# Patient Record
Sex: FEMALE | Race: WHITE | NOT HISPANIC OR LATINO | Employment: OTHER | URBAN - METROPOLITAN AREA
[De-identification: names, ages, dates, MRNs, and addresses within clinical notes are randomized per-mention and may not be internally consistent; named-entity substitution may affect disease eponyms.]

---

## 2017-01-17 ENCOUNTER — HOSPITAL ENCOUNTER (INPATIENT)
Facility: HOSPITAL | Age: 52
LOS: 3 days | Discharge: HOME HEALTH | DRG: 065 | End: 2017-01-20
Attending: EMERGENCY MEDICINE | Admitting: PSYCHIATRY & NEUROLOGY
Payer: MEDICAID

## 2017-01-17 ENCOUNTER — TELEMEDICINE (OUTPATIENT)
Dept: TELEMEDICINE | Facility: OTHER | Age: 52
End: 2017-01-17
Payer: MEDICAID

## 2017-01-17 DIAGNOSIS — E78.2 MIXED HYPERLIPIDEMIA: ICD-10-CM

## 2017-01-17 DIAGNOSIS — E78.5 HYPERLIPIDEMIA, UNSPECIFIED HYPERLIPIDEMIA TYPE: ICD-10-CM

## 2017-01-17 DIAGNOSIS — I25.10 CORONARY ARTERY DISEASE INVOLVING NATIVE CORONARY ARTERY OF NATIVE HEART WITHOUT ANGINA PECTORIS: ICD-10-CM

## 2017-01-17 DIAGNOSIS — Z92.82 TISSUE PLASMINOGEN ACTIVATOR (T-PA) ADMINISTERED AT OTHER FACILITY WITHIN 24 HOURS PRIOR TO CURRENT ADMISSION: ICD-10-CM

## 2017-01-17 DIAGNOSIS — G81.94 LEFT HEMIPARESIS: ICD-10-CM

## 2017-01-17 DIAGNOSIS — I10 HTN (HYPERTENSION), BENIGN: ICD-10-CM

## 2017-01-17 DIAGNOSIS — R00.0 TACHYCARDIA: ICD-10-CM

## 2017-01-17 DIAGNOSIS — I10 ESSENTIAL HYPERTENSION: ICD-10-CM

## 2017-01-17 DIAGNOSIS — I63.511 ACUTE RIGHT MCA STROKE: ICD-10-CM

## 2017-01-17 DIAGNOSIS — I63.9 ACUTE ISCHEMIC STROKE: ICD-10-CM

## 2017-01-17 DIAGNOSIS — G81.94 HEMIPARESIS, LEFT: ICD-10-CM

## 2017-01-17 LAB
ALBUMIN SERPL BCP-MCNC: 3.3 G/DL
ALP SERPL-CCNC: 51 U/L
ALT SERPL W/O P-5'-P-CCNC: 53 U/L
ANION GAP SERPL CALC-SCNC: 6 MMOL/L
AST SERPL-CCNC: 25 U/L
BASOPHILS # BLD AUTO: 0.03 K/UL
BASOPHILS NFR BLD: 0.6 %
BILIRUB SERPL-MCNC: 0.4 MG/DL
BUN SERPL-MCNC: 10 MG/DL
CALCIUM SERPL-MCNC: 8.6 MG/DL
CHLORIDE SERPL-SCNC: 109 MMOL/L
CO2 SERPL-SCNC: 25 MMOL/L
CREAT SERPL-MCNC: 0.7 MG/DL
DIFFERENTIAL METHOD: ABNORMAL
EOSINOPHIL # BLD AUTO: 0.1 K/UL
EOSINOPHIL NFR BLD: 2.4 %
ERYTHROCYTE [DISTWIDTH] IN BLOOD BY AUTOMATED COUNT: 13 %
EST. GFR  (AFRICAN AMERICAN): >60 ML/MIN/1.73 M^2
EST. GFR  (NON AFRICAN AMERICAN): >60 ML/MIN/1.73 M^2
GLUCOSE SERPL-MCNC: 85 MG/DL
HCT VFR BLD AUTO: 37.1 %
HGB BLD-MCNC: 12.6 G/DL
INR PPP: 1.1
LYMPHOCYTES # BLD AUTO: 1.6 K/UL
LYMPHOCYTES NFR BLD: 31.2 %
MCH RBC QN AUTO: 30.9 PG
MCHC RBC AUTO-ENTMCNC: 34 %
MCV RBC AUTO: 91 FL
MONOCYTES # BLD AUTO: 0.3 K/UL
MONOCYTES NFR BLD: 5.7 %
NEUTROPHILS # BLD AUTO: 3 K/UL
NEUTROPHILS NFR BLD: 59.9 %
PLATELET # BLD AUTO: 145 K/UL
PMV BLD AUTO: 10.8 FL
POCT GLUCOSE: 82 MG/DL (ref 70–110)
POTASSIUM SERPL-SCNC: 4.1 MMOL/L
PROT SERPL-MCNC: 6 G/DL
PROTHROMBIN TIME: 11.8 SEC
RBC # BLD AUTO: 4.08 M/UL
SODIUM SERPL-SCNC: 140 MMOL/L
TROPONIN I SERPL DL<=0.01 NG/ML-MCNC: <0.006 NG/ML
WBC # BLD AUTO: 5.06 K/UL

## 2017-01-17 PROCEDURE — 99291 CRITICAL CARE FIRST HOUR: CPT | Mod: ,,, | Performed by: EMERGENCY MEDICINE

## 2017-01-17 PROCEDURE — 82962 GLUCOSE BLOOD TEST: CPT

## 2017-01-17 PROCEDURE — 93010 ELECTROCARDIOGRAM REPORT: CPT | Mod: 76,,, | Performed by: INTERNAL MEDICINE

## 2017-01-17 PROCEDURE — 99291 CRITICAL CARE FIRST HOUR: CPT | Mod: 25

## 2017-01-17 PROCEDURE — 84443 ASSAY THYROID STIM HORMONE: CPT

## 2017-01-17 PROCEDURE — 93010 ELECTROCARDIOGRAM REPORT: CPT | Mod: ,,, | Performed by: INTERNAL MEDICINE

## 2017-01-17 PROCEDURE — 86850 RBC ANTIBODY SCREEN: CPT

## 2017-01-17 PROCEDURE — 99203 OFFICE O/P NEW LOW 30 MIN: CPT | Mod: GT,,, | Performed by: PSYCHIATRY & NEUROLOGY

## 2017-01-17 PROCEDURE — 85025 COMPLETE CBC W/AUTO DIFF WBC: CPT

## 2017-01-17 PROCEDURE — 96361 HYDRATE IV INFUSION ADD-ON: CPT

## 2017-01-17 PROCEDURE — 80053 COMPREHEN METABOLIC PANEL: CPT

## 2017-01-17 PROCEDURE — 82553 CREATINE MB FRACTION: CPT

## 2017-01-17 PROCEDURE — 84484 ASSAY OF TROPONIN QUANT: CPT

## 2017-01-17 PROCEDURE — 93005 ELECTROCARDIOGRAM TRACING: CPT

## 2017-01-17 PROCEDURE — 85610 PROTHROMBIN TIME: CPT

## 2017-01-17 PROCEDURE — 84439 ASSAY OF FREE THYROXINE: CPT

## 2017-01-17 PROCEDURE — 12000002 HC ACUTE/MED SURGE SEMI-PRIVATE ROOM

## 2017-01-17 PROCEDURE — 96374 THER/PROPH/DIAG INJ IV PUSH: CPT

## 2017-01-17 PROCEDURE — 83036 HEMOGLOBIN GLYCOSYLATED A1C: CPT

## 2017-01-17 PROCEDURE — 99233 SBSQ HOSP IP/OBS HIGH 50: CPT | Mod: ,,, | Performed by: NURSE PRACTITIONER

## 2017-01-17 PROCEDURE — 85730 THROMBOPLASTIN TIME PARTIAL: CPT

## 2017-01-17 PROCEDURE — 80061 LIPID PANEL: CPT

## 2017-01-17 PROCEDURE — 86900 BLOOD TYPING SEROLOGIC ABO: CPT

## 2017-01-17 RX ORDER — POTASSIUM CHLORIDE 7.45 MG/ML
60 INJECTION INTRAVENOUS
Status: DISCONTINUED | OUTPATIENT
Start: 2017-01-18 | End: 2017-01-19

## 2017-01-17 RX ORDER — CLONAZEPAM 1 MG/1
1 TABLET ORAL 3 TIMES DAILY
COMMUNITY

## 2017-01-17 RX ORDER — GLUCAGON 1 MG
1 KIT INJECTION
Status: DISCONTINUED | OUTPATIENT
Start: 2017-01-18 | End: 2017-01-20

## 2017-01-17 RX ORDER — OMEPRAZOLE 40 MG/1
40 CAPSULE, DELAYED RELEASE ORAL DAILY
COMMUNITY

## 2017-01-17 RX ORDER — MAGNESIUM SULFATE HEPTAHYDRATE 40 MG/ML
4 INJECTION, SOLUTION INTRAVENOUS
Status: DISCONTINUED | OUTPATIENT
Start: 2017-01-18 | End: 2017-01-19

## 2017-01-17 RX ORDER — MAGNESIUM SULFATE HEPTAHYDRATE 40 MG/ML
2 INJECTION, SOLUTION INTRAVENOUS
Status: DISCONTINUED | OUTPATIENT
Start: 2017-01-18 | End: 2017-01-19

## 2017-01-17 RX ORDER — INSULIN ASPART 100 [IU]/ML
0-5 INJECTION, SOLUTION INTRAVENOUS; SUBCUTANEOUS EVERY 6 HOURS PRN
Status: DISCONTINUED | OUTPATIENT
Start: 2017-01-18 | End: 2017-01-20

## 2017-01-17 RX ORDER — ATORVASTATIN CALCIUM 80 MG/1
80 TABLET, FILM COATED ORAL DAILY
COMMUNITY

## 2017-01-17 RX ORDER — AMOXICILLIN 250 MG
1 CAPSULE ORAL 2 TIMES DAILY
Status: DISCONTINUED | OUTPATIENT
Start: 2017-01-18 | End: 2017-01-20 | Stop reason: HOSPADM

## 2017-01-17 RX ORDER — POTASSIUM CHLORIDE 7.45 MG/ML
80 INJECTION INTRAVENOUS
Status: DISCONTINUED | OUTPATIENT
Start: 2017-01-18 | End: 2017-01-19

## 2017-01-17 RX ORDER — ONDANSETRON 2 MG/ML
4 INJECTION INTRAMUSCULAR; INTRAVENOUS EVERY 12 HOURS PRN
Status: DISCONTINUED | OUTPATIENT
Start: 2017-01-18 | End: 2017-01-20 | Stop reason: HOSPADM

## 2017-01-17 RX ORDER — CLOPIDOGREL BISULFATE 75 MG/1
75 TABLET ORAL DAILY
Status: ON HOLD | COMMUNITY
End: 2017-01-20 | Stop reason: HOSPADM

## 2017-01-17 RX ORDER — POTASSIUM CHLORIDE 7.45 MG/ML
40 INJECTION INTRAVENOUS
Status: DISCONTINUED | OUTPATIENT
Start: 2017-01-18 | End: 2017-01-19

## 2017-01-17 RX ORDER — ATORVASTATIN CALCIUM 20 MG/1
40 TABLET, FILM COATED ORAL DAILY
Status: DISCONTINUED | OUTPATIENT
Start: 2017-01-18 | End: 2017-01-20 | Stop reason: HOSPADM

## 2017-01-17 RX ORDER — SODIUM CHLORIDE 9 MG/ML
INJECTION, SOLUTION INTRAVENOUS CONTINUOUS
Status: DISCONTINUED | OUTPATIENT
Start: 2017-01-18 | End: 2017-01-20

## 2017-01-17 RX ORDER — FAMOTIDINE 20 MG/1
20 TABLET, FILM COATED ORAL 2 TIMES DAILY
Status: DISCONTINUED | OUTPATIENT
Start: 2017-01-18 | End: 2017-01-20 | Stop reason: HOSPADM

## 2017-01-17 RX ORDER — NICARDIPINE HYDROCHLORIDE 0.2 MG/ML
5 INJECTION INTRAVENOUS CONTINUOUS
Status: DISCONTINUED | OUTPATIENT
Start: 2017-01-18 | End: 2017-01-19

## 2017-01-17 RX ORDER — MULTIVITAMIN
1 TABLET ORAL DAILY
COMMUNITY

## 2017-01-17 RX ORDER — SODIUM CHLORIDE 0.9 % (FLUSH) 0.9 %
3 SYRINGE (ML) INJECTION EVERY 8 HOURS
Status: DISCONTINUED | OUTPATIENT
Start: 2017-01-18 | End: 2017-01-20 | Stop reason: HOSPADM

## 2017-01-17 RX ORDER — LISINOPRIL 10 MG/1
10 TABLET ORAL DAILY
COMMUNITY

## 2017-01-17 RX ORDER — PANTOPRAZOLE SODIUM 40 MG/1
40 TABLET, DELAYED RELEASE ORAL DAILY
Status: ON HOLD | COMMUNITY
End: 2017-01-20 | Stop reason: HOSPADM

## 2017-01-17 RX ORDER — CYPROHEPTADINE HYDROCHLORIDE 4 MG/1
4 TABLET ORAL 2 TIMES DAILY
COMMUNITY

## 2017-01-17 RX ORDER — LABETALOL HYDROCHLORIDE 5 MG/ML
10 INJECTION, SOLUTION INTRAVENOUS
Status: DISCONTINUED | OUTPATIENT
Start: 2017-01-18 | End: 2017-01-20 | Stop reason: HOSPADM

## 2017-01-17 RX ADMIN — IOHEXOL 100 ML: 350 INJECTION, SOLUTION INTRAVENOUS at 11:01

## 2017-01-17 NOTE — IP AVS SNAPSHOT
[facility]  [address]  [facility] [address]  Phone: [phone]           Patient Discharge Instructions     Our goal is to set you up for success. This packet includes information on your condition, medications, and your home care. It will help you to care for yourself so you don't get sicker and need to go back to the hospital.     Please ask your nurse if you have any questions.        There are many details to remember when preparing to leave the hospital. Here is what you will need to do:    1. Take your medicine. If you are prescribed medications, review your Medication List in the following pages. You may have new medications to  at the pharmacy and others that you'll need to stop taking. Review the instructions for how and when to take your medications. Talk with your doctor or nurses if you are unsure of what to do.     2. Go to your follow-up appointments. Specific follow-up information is listed in the following pages. Your may be contacted by a transition nurse or clinical provider about future appointments. Be sure we have all of the phone numbers to reach you, if needed. Please contact your provider's office if you are unable to make an appointment.     3. Watch for warning signs. Your doctor or nurse will give you detailed warning signs to watch for and when to call for assistance. These instructions may also include educational information about your condition. If you experience any of warning signs to your health, call your doctor.               [facility]  Unless otherwise directed by your provider, please contact [facility], our nurse care line that is available for 24/7 assistance.     [phone] (toll-free)    Registered nurses in the [facility] provide clinical advisement, health education, appointment booking, and other advisory services.                    ** Verify the list of medication(s) below is accurate and up  to date. Carry this with you in case of emergency. If your medications have changed, please notify your healthcare provider.             Medication List      START taking these medications        Additional Info                      aspirin 325 MG tablet   Quantity:  30 tablet   Refills:  1   Dose:  325 mg    Last time this was given:  325 mg on 1/20/2017  9:50 AM   Instructions:  Take 1 tablet (325 mg total) by mouth once daily.     Begin Date    AM    Noon    PM    Bedtime         CONTINUE taking these medications        Additional Info                      atorvastatin 80 MG tablet   Commonly known as:  LIPITOR   Refills:  0   Dose:  80 mg    Last time this was given:  40 mg on 1/20/2017  9:50 AM   Instructions:  Take 80 mg by mouth once daily.     Begin Date    AM    Noon    PM    Bedtime       cyproheptadine 4 mg tablet   Commonly known as:  PERIACTIN   Refills:  0   Dose:  4 mg    Instructions:  Take 4 mg by mouth 2 (two) times daily.     Begin Date    AM    Noon    PM    Bedtime       KLONOPIN 1 MG tablet   Refills:  0   Dose:  1 mg   Generic drug:  clonazePAM    Last time this was given:  1 mg on 1/20/2017  9:56 AM   Instructions:  Take 1 mg by mouth 2 (two) times daily as needed for Anxiety.     Begin Date    AM    Noon    PM    Bedtime       lisinopril 10 MG tablet   Refills:  0   Dose:  10 mg    Instructions:  Take 10 mg by mouth once daily.     Begin Date    AM    Noon    PM    Bedtime       omeprazole 40 MG capsule   Commonly known as:  PRILOSEC   Refills:  0   Dose:  40 mg    Instructions:  Take 40 mg by mouth once daily.     Begin Date    AM    Noon    PM    Bedtime       ONE DAILY MULTIVITAMIN per tablet   Refills:  0   Dose:  1 tablet   Generic drug:  multivitamin    Instructions:  Take 1 tablet by mouth once daily.     Begin Date    AM    Noon    PM    Bedtime         STOP taking these medications     clopidogrel 75 mg tablet   Commonly known as:  PLAVIX       pantoprazole 40 MG tablet   Commonly  known as:  PROTONIX            Where to Get Your Medications      These medications were sent to [facility] - [place], [place] - [address]  [address], [place] [address]     Phone:  [phone]     aspirin 325 MG tablet                  Please bring to all follow up appointments:    1. A copy of your discharge instructions.  2. All medicines you are currently taking in their original bottles.  3. Identification and insurance card.    Please arrive 15 minutes ahead of scheduled appointment time.    Please call 24 hours in advance if you must reschedule your appointment and/or time.        Your Scheduled Appointments     Feb 20, 2017 11:20 AM CST   New Patient with Yadiel Booth MD   [facility] ([facility] )    [address]  [place] [id number] [phone]            Jun 08, 2017  1:00 PM CDT   New Patient with AIMEE PMR RESIDENT   [facility] ([facility])    [address] [place] [address], [facility] [id number] [phone]              Follow-up Information     Follow up with Anuel Geronimo NP On 1/24/2017.    Specialty:  Family Medicine    Why:  Hospital follow up @ 9:15 am     Contact information:    [address] [place] [address]  [place] [address]  [phone]          Follow up with Yadiel Booth MD On 2/20/2017.    Specialty:  Neurology    Why:  Hospital follow up  @ 11:20    Contact information:    [address] [facility] [address]  [phone]          Follow up with Anuel Geronimo NP. Schedule an appointment as soon as possible for a visit in 1 week.    Specialty:  Family Medicine    Contact information:    [address] [facility] [address]  [place] [address]  [phone]        Referrals     Future Orders    Referral to OutPatient  Physical therapy     Questions:    Post Surgical?:  No    Eval and Treat:  Yes    Duration:      Frequency (times per week):      Precautions:      Location:      OT Location:      Restore  "Functional ADL Training?:      Gait Training:      Therapeutic Exercises:      Wound Care:      Traction:      Electric Stimulation:      IONTO.:      U/S:      Developmental Stimulation?:      Specialty Programs:      Referral to Outpatient Occupational therapy     Questions:    Post Surgical?:  No    OT Location:      Restore Functional ADL Training?:      Therapeutic Exercises:      Electric Stimulation:      IONTO.:      U/S:      Developmental Stimulation?:      Splinting (Specific Area):      Specialty Program:      Referral to OutPatient Speech Therapy     Questions:    Speech Therapy Eval and Treat:      Speech Language Eval and Treat:      Bedside Swallow Study:      Modified Barium Swallow Study:          Discharge Instructions     Future Orders    3 IN 1 COMMODE FOR HOME USE     Questions:    Type:  Standard    Height:  5' 2" (1.575 m)    Weight:  54.3 kg (119 lb 11.4 oz)    Does patient have medical equipment at home?:  none    Length of need (1-99 months):  99    Activity as tolerated     CANE FOR HOME USE     Questions:    Type of Cane:  Straight    Height:  5' 2" (1.575 m)    Weight:  54.3 kg (119 lb 11.4 oz)    Does patient have medical equipment at home?:  none    Length of need (1-99 months):  99    Please check all that apply:  Patient is unable to safely ambulate without equipment.    Patient's condition impairs ambulation.    Diet general     Questions:    Total calories:      Fat restriction, if any:      Protein restriction, if any:      Na restriction, if any:      Fluid restriction:      Additional restrictions:          Primary Diagnosis     Your primary diagnosis was:  Stroke      Admission Information     Date & Time Provider Department [facility]    1/17/2017 10:30 PM Chas Atkinson MD [facility] [id number]      Care Providers     Provider Role Specialty Primary office phone    Chas Atkinson MD Attending Provider Neurology [phone]    Nabor Denton MD " "Team Attending  Neuro Critical Care [phone]    Bishop Goddard MD Team Attending  Neuro Critical Care [phone]    Audie Fernandez MD Team Attending  Interventional Neurology [phone]    Chas Atkinson MD Team Attending  Neurology [phone]      Your Vitals Were     BP Pulse Temp Resp Height Weight    125/82 (BP Location: Right arm, Patient Position: Lying, BP Method: Automatic) 69 97.8 °F (36.6 °C) (Oral) 16 5' 2" (1.575 m) 54.3 kg (119 lb 11.4 oz)    SpO2 BMI             99% 21.9 kg/m2         Recent Lab Values        1/17/2017                          10:44 PM           A1C 5.6           Comment for A1C at 10:44 PM on 1/17/2017:  According to ADA guidelines, hemoglobin A1C <7.0% represents  optimal control in non-pregnant diabetic patients.  Different  metrics may apply to specific populations.   Standards of Medical Care in Diabetes - 2016.  For the purpose of screening for the presence of diabetes:  <5.7%     Consistent with the absence of diabetes  5.7-6.4%  Consistent with increasing risk for diabetes   (prediabetes)  >or=6.5%  Consistent with diabetes  Currently no consensus exists for use of hemoglobin A1C  for diagnosis of diabetes for children.        Allergies as of 1/20/2017        Reactions    Bentyl [Dicyclomine]     Clindamycin     Macrobid [Nitrofurantoin Monohyd/m-cryst]     Percocet [Oxycodone-acetaminophen]     Thorazine [Chlorpromazine]     Topamax [Topiramate]     Tramadol       Advance Directives     An advance directive is a document which, in the event you are no longer able to make decisions for yourself, tells your healthcare team what kind of treatment you do or do not want to receive, or who you would like to make those decisions for you.  If you do not currently have an advance directive, [facility] encourages you to create one.  For more information call:  [phone]), [phone] ([phone]),  or log on to [email or web address].      "   Smoking Cessation     If you would like to quit smoking:   You may be eligible for free services if you are a [place] resident and started smoking cigarettes before September 1, 1988.  Call the Smoking Cessation Trust (SCT) toll free at (160) 616-2628 or (524) 772-2127.   Call 1-800-QUIT-NOW if you do not meet the above criteria.            Language Assistance Services     ATTENTION: Language assistance services are available, free of charge. Please call [phone].      ATENCIÓN: Si habla radha, tiene a hook disposición servicios gratuitos de asistencia lingüística. Llame al [phone].     CHÚ Ý: N?u b?n nói Ti?ng Vi?t, có các d?ch v? h? tr? ngôn ng? mi?n phí dành cho b?n. G?i s? [phone].        Stroke Education              [facility] Sign-Up     Activating your [facility] account is as easy as 1-2-3!     1) Visit [email or web address], select Sign Up Now, enter this activation code and your date of birth, then select Next.  [email or web address]  Expires: 3/6/2017  3:36 PM      2) Create a username and password to use when you visit [facility] in the future and select a security question in case you lose your password and select Next.    3) Enter your e-mail address and click Sign Up!    Additional Information  If you have questions, please e-mail [email or web address] or call [phone] to talk to our [facility] staff. Remember, [facility] is NOT to be used for urgent needs. For medical emergencies, dial 911.          [facility] complies with applicable Federal civil rights laws and does not discriminate on the basis of race, color, national origin, age, disability, or sex.

## 2017-01-18 PROBLEM — I10 HTN (HYPERTENSION), BENIGN: Status: ACTIVE | Noted: 2017-01-18

## 2017-01-18 PROBLEM — Z92.82 TISSUE PLASMINOGEN ACTIVATOR (T-PA) ADMINISTERED AT OTHER FACILITY WITHIN 24 HOURS PRIOR TO CURRENT ADMISSION: Status: ACTIVE | Noted: 2017-01-18

## 2017-01-18 PROBLEM — G81.94 LEFT HEMIPARESIS: Status: ACTIVE | Noted: 2017-01-18

## 2017-01-18 PROBLEM — E78.5 HYPERLIPIDEMIA: Status: ACTIVE | Noted: 2017-01-18

## 2017-01-18 LAB
ABO + RH BLD: NORMAL
ALBUMIN SERPL BCP-MCNC: 2.4 G/DL
ALP SERPL-CCNC: 37 U/L
ALT SERPL W/O P-5'-P-CCNC: 34 U/L
ANION GAP SERPL CALC-SCNC: 3 MMOL/L
ANION GAP SERPL CALC-SCNC: 5 MMOL/L
APTT BLDCRRT: 22.3 SEC
AST SERPL-CCNC: 16 U/L
BACTERIA #/AREA URNS AUTO: ABNORMAL /HPF
BASOPHILS # BLD AUTO: 0.01 K/UL
BASOPHILS NFR BLD: 0.2 %
BILIRUB SERPL-MCNC: 0.4 MG/DL
BILIRUB UR QL STRIP: NEGATIVE
BLD GP AB SCN CELLS X3 SERPL QL: NORMAL
BUN SERPL-MCNC: 8 MG/DL
BUN SERPL-MCNC: 9 MG/DL
CALCIUM SERPL-MCNC: 6.3 MG/DL
CALCIUM SERPL-MCNC: 8.6 MG/DL
CHLORIDE SERPL-SCNC: 109 MMOL/L
CHLORIDE SERPL-SCNC: 119 MMOL/L
CHOLEST/HDLC SERPL: 4.9 {RATIO}
CK MB SERPL-MCNC: 0.4 NG/ML
CK MB SERPL-RTO: 0.9 %
CK SERPL-CCNC: 43 U/L
CLARITY UR REFRACT.AUTO: ABNORMAL
CO2 SERPL-SCNC: 21 MMOL/L
CO2 SERPL-SCNC: 28 MMOL/L
COLOR UR AUTO: YELLOW
CREAT SERPL-MCNC: 0.6 MG/DL
CREAT SERPL-MCNC: 0.8 MG/DL
DIFFERENTIAL METHOD: ABNORMAL
EOSINOPHIL # BLD AUTO: 0.1 K/UL
EOSINOPHIL NFR BLD: 1.5 %
ERYTHROCYTE [DISTWIDTH] IN BLOOD BY AUTOMATED COUNT: 12.9 %
EST. GFR  (AFRICAN AMERICAN): >60 ML/MIN/1.73 M^2
EST. GFR  (AFRICAN AMERICAN): >60 ML/MIN/1.73 M^2
EST. GFR  (NON AFRICAN AMERICAN): >60 ML/MIN/1.73 M^2
EST. GFR  (NON AFRICAN AMERICAN): >60 ML/MIN/1.73 M^2
ESTIMATED AVG GLUCOSE: 114 MG/DL
GLUCOSE SERPL-MCNC: 111 MG/DL
GLUCOSE SERPL-MCNC: 59 MG/DL
GLUCOSE UR QL STRIP: ABNORMAL
HBA1C MFR BLD HPLC: 5.6 %
HCT VFR BLD AUTO: 29.1 %
HDL/CHOLESTEROL RATIO: 20.5 %
HDLC SERPL-MCNC: 132 MG/DL
HDLC SERPL-MCNC: 27 MG/DL
HGB BLD-MCNC: 9.8 G/DL
HGB UR QL STRIP: NEGATIVE
KETONES UR QL STRIP: NEGATIVE
LDLC SERPL CALC-MCNC: 74.2 MG/DL
LEUKOCYTE ESTERASE UR QL STRIP: ABNORMAL
LYMPHOCYTES # BLD AUTO: 1 K/UL
LYMPHOCYTES NFR BLD: 23.3 %
MAGNESIUM SERPL-MCNC: 1.5 MG/DL
MCH RBC QN AUTO: 30.2 PG
MCHC RBC AUTO-ENTMCNC: 33.7 %
MCV RBC AUTO: 90 FL
MICROSCOPIC COMMENT: ABNORMAL
MONOCYTES # BLD AUTO: 0.2 K/UL
MONOCYTES NFR BLD: 5.6 %
NEUTROPHILS # BLD AUTO: 2.9 K/UL
NEUTROPHILS NFR BLD: 69.2 %
NITRITE UR QL STRIP: NEGATIVE
NONHDLC SERPL-MCNC: 105 MG/DL
PH UR STRIP: 6 [PH] (ref 5–8)
PHOSPHATE SERPL-MCNC: 2.8 MG/DL
PLATELET # BLD AUTO: 109 K/UL
PMV BLD AUTO: 10.6 FL
POCT GLUCOSE: 83 MG/DL (ref 70–110)
POTASSIUM SERPL-SCNC: 3.1 MMOL/L
POTASSIUM SERPL-SCNC: 4 MMOL/L
PROT SERPL-MCNC: 4.2 G/DL
PROT UR QL STRIP: NEGATIVE
RBC # BLD AUTO: 3.25 M/UL
RBC #/AREA URNS AUTO: 1 /HPF (ref 0–4)
SODIUM SERPL-SCNC: 142 MMOL/L
SODIUM SERPL-SCNC: 143 MMOL/L
SP GR UR STRIP: 1.03 (ref 1–1.03)
SQUAMOUS #/AREA URNS AUTO: 4 /HPF
T4 FREE SERPL-MCNC: 1.08 NG/DL
TRIGL SERPL-MCNC: 154 MG/DL
TROPONIN I SERPL DL<=0.01 NG/ML-MCNC: 0.01 NG/ML
TROPONIN I SERPL DL<=0.01 NG/ML-MCNC: <0.006 NG/ML
TROPONIN I SERPL DL<=0.01 NG/ML-MCNC: <0.006 NG/ML
TSH SERPL DL<=0.005 MIU/L-ACNC: 0.18 UIU/ML
URN SPEC COLLECT METH UR: ABNORMAL
UROBILINOGEN UR STRIP-ACNC: NEGATIVE EU/DL
WBC # BLD AUTO: 4.12 K/UL
WBC #/AREA URNS AUTO: 23 /HPF (ref 0–5)

## 2017-01-18 PROCEDURE — 99233 SBSQ HOSP IP/OBS HIGH 50: CPT | Mod: ,,, | Performed by: PSYCHIATRY & NEUROLOGY

## 2017-01-18 PROCEDURE — 81001 URINALYSIS AUTO W/SCOPE: CPT

## 2017-01-18 PROCEDURE — 20000000 HC ICU ROOM

## 2017-01-18 PROCEDURE — 25000003 PHARM REV CODE 250: Performed by: PSYCHIATRY & NEUROLOGY

## 2017-01-18 PROCEDURE — G8996 SWALLOW CURRENT STATUS: HCPCS | Mod: CH

## 2017-01-18 PROCEDURE — G8997 SWALLOW GOAL STATUS: HCPCS | Mod: CH

## 2017-01-18 PROCEDURE — 92523 SPEECH SOUND LANG COMPREHEN: CPT

## 2017-01-18 PROCEDURE — G8988 SELF CARE GOAL STATUS: HCPCS | Mod: CJ

## 2017-01-18 PROCEDURE — 85025 COMPLETE CBC W/AUTO DIFF WBC: CPT

## 2017-01-18 PROCEDURE — 25000003 PHARM REV CODE 250: Performed by: NURSE PRACTITIONER

## 2017-01-18 PROCEDURE — 83735 ASSAY OF MAGNESIUM: CPT

## 2017-01-18 PROCEDURE — 25500020 PHARM REV CODE 255: Performed by: EMERGENCY MEDICINE

## 2017-01-18 PROCEDURE — 97165 OT EVAL LOW COMPLEX 30 MIN: CPT

## 2017-01-18 PROCEDURE — 80048 BASIC METABOLIC PNL TOTAL CA: CPT

## 2017-01-18 PROCEDURE — 97161 PT EVAL LOW COMPLEX 20 MIN: CPT

## 2017-01-18 PROCEDURE — 84484 ASSAY OF TROPONIN QUANT: CPT | Mod: 91

## 2017-01-18 PROCEDURE — 84484 ASSAY OF TROPONIN QUANT: CPT

## 2017-01-18 PROCEDURE — 80053 COMPREHEN METABOLIC PANEL: CPT

## 2017-01-18 PROCEDURE — 92610 EVALUATE SWALLOWING FUNCTION: CPT

## 2017-01-18 PROCEDURE — G8987 SELF CARE CURRENT STATUS: HCPCS | Mod: CK

## 2017-01-18 PROCEDURE — 84100 ASSAY OF PHOSPHORUS: CPT

## 2017-01-18 RX ORDER — ASPIRIN 325 MG
325 TABLET ORAL DAILY
Status: DISCONTINUED | OUTPATIENT
Start: 2017-01-18 | End: 2017-01-20 | Stop reason: HOSPADM

## 2017-01-18 RX ORDER — HEPARIN SODIUM 5000 [USP'U]/ML
5000 INJECTION, SOLUTION INTRAVENOUS; SUBCUTANEOUS EVERY 8 HOURS
Status: DISCONTINUED | OUTPATIENT
Start: 2017-01-18 | End: 2017-01-20 | Stop reason: HOSPADM

## 2017-01-18 RX ORDER — ACETAMINOPHEN 325 MG/1
650 TABLET ORAL EVERY 6 HOURS PRN
Status: DISCONTINUED | OUTPATIENT
Start: 2017-01-18 | End: 2017-01-20 | Stop reason: HOSPADM

## 2017-01-18 RX ADMIN — SODIUM CHLORIDE: 0.9 INJECTION, SOLUTION INTRAVENOUS at 12:01

## 2017-01-18 RX ADMIN — SODIUM CHLORIDE: 0.9 INJECTION, SOLUTION INTRAVENOUS at 05:01

## 2017-01-18 RX ADMIN — Medication 3 ML: at 10:01

## 2017-01-18 RX ADMIN — FAMOTIDINE 20 MG: 20 TABLET, FILM COATED ORAL at 10:01

## 2017-01-18 RX ADMIN — ATORVASTATIN CALCIUM 40 MG: 20 TABLET, FILM COATED ORAL at 10:01

## 2017-01-18 RX ADMIN — STANDARDIZED SENNA CONCENTRATE AND DOCUSATE SODIUM 1 TABLET: 8.6; 5 TABLET, FILM COATED ORAL at 10:01

## 2017-01-18 RX ADMIN — SODIUM CHLORIDE: 0.9 INJECTION, SOLUTION INTRAVENOUS at 10:01

## 2017-01-18 RX ADMIN — ASPIRIN 325 MG ORAL TABLET 325 MG: 325 PILL ORAL at 10:01

## 2017-01-18 RX ADMIN — FAMOTIDINE 20 MG: 20 TABLET, FILM COATED ORAL at 09:01

## 2017-01-18 RX ADMIN — DEXTROSE MONOHYDRATE 12.5 G: 25 INJECTION, SOLUTION INTRAVENOUS at 08:01

## 2017-01-18 RX ADMIN — HEPARIN SODIUM 5000 UNITS: 5000 INJECTION, SOLUTION INTRAVENOUS; SUBCUTANEOUS at 10:01

## 2017-01-18 RX ADMIN — ACETAMINOPHEN 650 MG: 325 TABLET ORAL at 05:01

## 2017-01-18 NOTE — ED NOTES
"Patient identifiers verified and correct for Shun Tidwell. Pt resting with family at bedside.    LOC: The patient is awake, alert and oriented x 4. speaking appropriately, slightly slurred speech  APPEARANCE: Patient resting comfortably and in no acute distress. clean and well groomed. No JVD visible. Stated, "pain level is a 0."  SKIN: skin is warm dry and intact. color consistent with ethnicity. No tenting observed. capillary refill <3 seconds. no clubbing noted to nail beds. no breakdown or brusing visible. Mucus membranes moist acyanotic  MUSKULOSKELETAL: Full range of motion present in all extremities. Hand  and leg strength strong +2 bilaterally  RESPIRATORY: Airway is open and patent. Respirations-unlabored, spontaneous, equal bilaterally on inspiration and expiration. normal rate and rhythm. No accessory muscle use noted. Lungs clear to auscultation in all fields bilaterally anterior and posterior.   CARDIAC: Patient has even and regular rhythm. no bruits auscultated or thrills palpated. no peripheral edema noted. No chest pain.  ABDOMEN: Soft and non tender to palpation. no distention noted. Normoactive bowel sounds X4 quadrants. No complaints of abnormal bowel movements. Normal appetite.   NEUROLOGIC: See neuro exam  : No complaints of frequency, burning, urgency or blood in the urine.           "

## 2017-01-18 NOTE — PLAN OF CARE
Problem: SLP Goal  Goal: SLP Goal  Outcome: Ongoing (interventions implemented as appropriate)  Regular diet with thin liquids recmmended.  Pt. Takes pills one at a time.    Alicia Hernandez MA/[facility]-SLP  Speech Language Pathologist  Pager [phone]  1/18/2017

## 2017-01-18 NOTE — PT/OT/SLP EVAL
Occupational Therapy  Evaluation    Shun Tidwell   MRN: [id number]   Admitting Diagnosis: Acute right MCA stroke    OT Date of Treatment: 17   OT Start Time: 825  OT Stop Time: 845  OT Total Time (min): 20 min    Billable Minutes:  Evaluation 20    Diagnosis: Acute right MCA stroke   Left sided facial & arm numbness & speech difficulties, given tPA, then at [facility] started having chest pain, headache & blurry vision    Past Medical History   Diagnosis Date    Anxiety     CAD (coronary artery disease), native coronary artery     Current smoker     Depression     HTN (hypertension), benign     Hyperlipidemia       Past Surgical History   Procedure Laterality Date    Femoral stents      Abdominal aortic stent      Cholecystectomy      Hysterectomy      Bladder suspension      Hernia repair         Referring physician: MD Phan  Date referred to OT:     General Precautions: Standard, aspiration, NPO, fall (cardiac)    Do you have any cultural, spiritual, Adventism conflicts, given your current situation?: Jehovah's witness     Patient History:  Living Environment  Living Environment Comment: Pt resides with her daughter in 1 story house with no steps.  Pt owns no DME.  Equipment Currently Used at Home: none    Prior level of function:   Bed Mobility/Transfers: independent  Grooming: independent  Bathing: independent (pt has both a bathtub & walk-in shower for bathing.)  Upper Body Dressing: independent  Lower Body Dressing: independent  Toileting: independent  Driving License: No  Occupation: On disability  Leisure and Hobbies: anything that gets her out of the house     Dominant hand: right    Subjective:  Communicated with RN prior to session.  Pt was tearful during session regarding being hungry.  Chief Complaint: being hungry  Patient/Family stated goals: none stated    Pain Ratin/10  Pain Rating Post-Intervention: 0/10    Objective:  Patient found with: blood pressure cuff, telemetry, pulse ox  (continuous), peripheral IV    Cognitive Exam:  Oriented to: Person, Place, Time and Situation  Follows Commands/attention: Follows multistep  commands  Communication: clear/fluent  Memory:  No Deficits noted  Safety awareness/insight to disability: intact  Coping skills/emotional control: Tearful    Visual/perceptual:  Will need to assess    Physical Exam:  Postural examination/scapula alignment: Rounded shoulder, Head forward and Posterior pelvic tilt  Skin integrity: Thin  Edema: None noted BUE    Sensation:   Impaired  light/touch LUE, kinesthesia LUE and proprioception LUE    Upper Extremity Range of Motion:  Right Upper Extremity: WFL  Left Upper Extremity: WFL    Upper Extremity Strength:  Right Upper Extremity: WFL  Left Upper Extremity: 3+/5 elbow flexion, 4/5 elbow extension, moderate , 3-/5 shoulder flexion, 4/5 shoulder extension   Strength: moderate for LUE    Fine motor coordination:   Intact  Left hand thumb/finger opposition skills and Right hand thumb/finger opposition skills    Gross motor coordination: WFL    Functional Mobility:  Bed Mobility:  Supine to Sit: Stand by Assistance  Sit to Supine: Stand by Assistance    Transfers:  Sit <> Stand Assistance: Minimum Assistance (from stretcher)  Sit <> Stand Assistive Device: No Assistive Device    Functional Ambulation: Pt took 2 steps forward & backward with Min (A).    Activities of Daily Living:     UE Dressing Level of Assistance: Contact guard (donning gown around back while seated edge of stretcher)  LE Dressing Level of Assistance: Stand by assistance (donning socks seated edge of stretcher)  Grooming Position: Standing  Grooming Level of Assistance: Minimum assistance     AM-PAC 6 CLICK ADL  How much help from another person does this patient currently need?  1 = Unable, Total/Dependent Assistance  2 = A lot, Maximum/Moderate Assistance  3 = A little, Minimum/Contact Guard/Supervision  4 = None, Modified  "[place]/Independent    Putting on and taking off regular lower body clothing? : 3  Bathing (including washing, rinsing, drying)?: 3  Toileting, which includes using toilet, bedpan, or urinal? : 3  Putting on and taking off regular upper body clothing?: 3  Taking care of personal grooming such as brushing teeth?: 3  Eating meals?: 1  Total Score: 16    AM-PAC Raw Score CMS "G-Code Modifier Level of Impairment Assistance   6 % Total / Unable   7 - 9 CM 80 - 100% Maximal Assist   10 - 14 CL 60 - 80% Moderate Assist   15 - 19 CK 40 - 60% Moderate Assist   20 - 22 CJ 20 - 40% Minimal Assist   23 CI 1-20% SBA / CGA   24 CH 0% Independent/ Mod I       Patient left supine with all lines intact, call button in reach, RN notified, family member present and white board updated.    Assessment:  Shun Tidwell is a 51 y.o. female with a medical diagnosis of Acute right MCA stroke and presents with decreased sensation, strength & coordination of LUE affecting all ADL's and functional mobility.  Pt would benefit from OT to address independence with ADL's.    Rehab identified problem list/impairments: Rehab identified problem list/impairments: weakness, impaired endurance, impaired sensation, impaired self care skills, impaired functional mobilty, impaired balance, decreased coordination, decreased upper extremity function, decreased lower extremity function    Rehab potential is good.    Activity tolerance: Good    Discharge recommendations: Discharge Facility/Level Of Care Needs: home health OT     GOALS:   Occupational Therapy Goals        Problem: Occupational Therapy Goal    Goal Priority Disciplines Outcome Interventions   Occupational Therapy Goal     OT, PT/OT Ongoing (interventions implemented as appropriate)    Description:  Goals to be met by: 1/25     Patient will increase functional independence with ADLs by performing:    UE Dressing with Supervision.  LE Dressing with Supervision.  Grooming while standing " with Supervision.  Toileting from toilet with Supervision for hygiene and clothing management.   Rolling to Bilateral with Modified [place].   Supine to sit with Modified [place].  Stand pivot transfers with Supervision.                PLAN:  Patient to be seen 6 x/week to address the above listed problems via self-care/home management, therapeutic activities, cognitive retraining, neuromuscular re-education, therapeutic exercises, sensory integration  Plan of Care expires: 02/17/17  Plan of Care reviewed with: patient, family    OT G-codes  Functional Assessment Tool Used: [facility]  Score: 16  Functional Limitation: Self care  Self Care Current Status (): CK  Self Care Goal Status (): BREE Gardner  01/18/2017

## 2017-01-18 NOTE — ED NOTES
Spoke with neuro-critical care resident to report Ca result, he acknowledged and stated he would look at it and give further orders PRN.

## 2017-01-18 NOTE — H&P
History & Physical  Neurocritical Care    Admit Date: 1/17/2017  LOS: 0    Code Status: Full Code     CC: <principal problem not specified>    SUBJECTIVE:     History of Present Illness:     50 yo female with hx of HTN and CAD here with new onset L sided face and arm numbness with some dysarthria.  Initially seen at [facility] and evaluated by Dr. Booth via telestroke.  CT scan on initial eval was negative and tPA was recommended.  Pt received bolus and infusion dose and was transferred to [facility] for further evaluation and CTA for possible thrombectomy.  Of note, pt complained of chest and back pain prior to transfer and was treated with nitro paste along with SL nitroglycerin.  NS bolus was administered following this due to resulting hypotension.      On arrival and history taking, pt no longer complaining of stroke deficits other than L sided numbness, though when probed still complains of L sided weakness.  She complains of persistent substernal chest pain and back pain which is nonradiating and not associated with any other symptoms.  NCC consulted for further evaluation and management in the post-tPA period.          Past Medical History   Diagnosis Date    Anxiety     CAD (coronary artery disease), native coronary artery     Current smoker     Depression     HTN (hypertension), benign     Hyperlipidemia      Past Surgical History   Procedure Laterality Date    Femoral stents      Abdominal aortic stent      Cholecystectomy      Hysterectomy      Bladder suspension      Hernia repair       No current facility-administered medications on file prior to encounter.      No current outpatient prescriptions on file prior to encounter.     Review of patient's allergies indicates:   Allergen Reactions    Bentyl [dicyclomine]     Clindamycin     Macrobid [nitrofurantoin monohyd/m-cryst]     Percocet [oxycodone-acetaminophen]     Thorazine [chlorpromazine]     Topamax [topiramate]     Tramadol       No family history on file.  Social History   Substance Use Topics    Smoking status: Current Every Day Smoker     Packs/day: 2.00     Types: Cigarettes    Smokeless tobacco: Never Used    Alcohol use None      Review of Symptoms:  Constitutional: Denies fevers, weight loss, chills, or weakness.  Eyes: Denies changes in vision.  ENT: Denies dysphagia, nasal discharge, ear pain or discharge.  Cardiovascular: Denies chest pain, palpitations, orthopnea, or claudication.  Respiratory: Denies shortness of breath, cough, hemoptysis, or wheezing.  GI: Denies nausea/vomitting, hematochezia, melena, abd pain, or changes in appetite.  : Denies dysuria, incontinence, or hematuria.  Musculoskeletal: Denies joint pain or myalgias.  Skin/breast: Denies rashes, lumps, lesions, or discharge.  Neurologic: Denies headache, dizziness, vertigo, or paresthesias.  Complains of L sided numbness and weakness.   Psychiatric: Denies changes in mood or hallucinations.  Endocrine: Denies polyuria, polydipsia, heat/cold intolerance.  Hematologic/Lymph: Denies lymphadenopathy, easy bruising or easy bleeding.  Allergic/Immunologic: Denies rash, rhinitis.     OBJECTIVE:   Vital Signs (Most Recent):   Temp: 98.9 °F (37.2 °C) (01/17/17 2227)  Pulse: (!) 58 (01/17/17 2327)  Resp: 18 (01/17/17 2327)  BP: 109/67 (01/17/17 2327)  SpO2: 98 % (room air) (01/17/17 2327)    Vital Signs (24h Range):   Temp:  [98.9 °F (37.2 °C)] 98.9 °F (37.2 °C)  Pulse:  [54-68] 58  Resp:  [16-18] 18  SpO2:  [97 %-100 %] 98 %  BP: (109-125)/(67-79) 109/67    ICP/CPP (Last 24h):        I & O (Last 24h):  No intake or output data in the 24 hours ending 01/17/17 2343  Physical Exam:  GA: Alert, comfortable, no acute distress.   HEENT: No scleral icterus or JVD.   Pulmonary: Clear to auscultation A/P/L. No wheezing, crackles, or rhonchi.  Cardiac: RRR S1 & S2 w/o rubs/murmurs/gallops.   Abdominal: Bowel sounds present x 4. No appreciable hepatosplenomegaly.  Skin: No  jaundice, rashes, or visible lesions.  Neuro:  --GCS: E4 V5 M6  --Mental Status:  Awake, alert, oriented to self, place, time and reason for admission  --CN II-XII grossly intact.   --Pupils 4-2mm, PERRL.   --Corneal reflex, gag, cough intact.  --LUE strength: 5/5  --RUE strength: 5/5  --LLE strength: 5/5  --RLE strength: 5/5  -- Complains of L sided numbness    Vent Data:        Lines/Drains/Airway:          Nutrition/Tube Feeds:   Current Diet Order   Procedures    Diet NPO     No food intake until passes NEO or evaluated by speech.       Labs:  ABG: No results for input(s): PH, PO2, PCO2, HCO3, POCSATURATED, BE in the last 24 hours.  BMP:  Recent Labs  Lab 01/17/17  2244      K 4.1      CO2 25   BUN 10   CREATININE 0.7   GLU 85     LFT: Lab Results   Component Value Date    AST 25 01/17/2017    ALT 53 (H) 01/17/2017    ALKPHOS 51 (L) 01/17/2017    BILITOT 0.4 01/17/2017    ALBUMIN 3.3 (L) 01/17/2017    PROT 6.0 01/17/2017     CBC:   Lab Results   Component Value Date    WBC 5.06 01/17/2017    HGB 12.6 01/17/2017    HCT 37.1 01/17/2017    MCV 91 01/17/2017     (L) 01/17/2017     Microbiology x 7d:   Microbiology Results (last 7 days)     ** No results found for the last 168 hours. **        Imaging:  Imaging Results         X-Ray Chest AP Portable (Final result) Result time:  01/17/17 23:35:00    Final result by Dwight Cruz MD (01/17/17 23:35:00)    Impression:        No acute findings in the chest.        Electronically signed by: DWIGHT CRUZ MD  Date:     01/17/17  Time:    23:35     Narrative:    Chest AP portable    Indication:Stroke.    Comparison:December 3, 2014.    Findings:         Heart and lungs unchanged when allowing for differences in technique and positioning.            CTA STROKE MULTI-PHASE (Final result)    Abnormal Result time:  01/18/17 00:09:55    Final result by Kenneth Gomez MD (01/18/17 00:09:55)    Impression:        No CT evidence of acute infarction or  intracranial hemorrhage in this patient is status post tPA.    No evidence of vascular high-grade stenosis, aneurysm, dissection or AVM.    Mild calcific atherosclerosis at the origin of the left common carotid artery resulting in 20% stenosis by NASCET criteria.    Stable hypodense lesion identified in the right basal ganglia, may represent remote lacunar type infarct versus prominent perivascular space.    Impression was discussed with Dr. De La Fuente and Mitzi Trejo NP of stroke team by Dr. Lopez on behalf of Dr. Bazzi at 11:35 and 11:20 PM, respectively.     This report has been flagged in the [facility] medical record.          ______________________________________     Electronically signed by resident: DINORA LOPEZ MD  Date:     01/17/17  Time:    23:54            As the supervising and teaching physician, I personally reviewed the images and resident's interpretation and I agree with the findings.          Electronically signed by: ZOHAIB PHAN MD  Date:     01/18/17  Time:    00:09     Narrative:     CTA STROKE MULTI-PHASE    HISTORY:  L sided weakness, s/p TPA infusion    TECHNIQUE: 5 mm axial images were acquired from the vertex to the skullbase, without administration of contrast. Multiplanar reconstructions were provided for review.    CT angiogram was performed during bolus administration of intravenous contrast.  Scanning was performed from the top of aortic arch to the vertex. Multiplanar reconstructions and maximum intensity projection images were provided for review. Additional CTA images of head were obtained in 2 delayed phases per multiphase CTA protocol.    A total of 100 mL of Omnipaque 350 contrast was administered intravenously without immediate adverse reaction.    COMPARISON: CT head 2/22//15.  FINDINGS:    Brain and intracranial structures:     There is a stable hypodense lesion identified in the right basal ganglia, may represent remote lacunar type infarct versus prominent  perivascular space.      There is no evidence of large vascular territory acute infarction or parenchymal hemorrhage. Gray-white differentiation is preserved.   There is no CT evidence of extra-axial masses or fluid collection.  There is mild cerebral volume loss with compensatory enlargement of the ventricular system and sulci.        CTA: There is a normal 3 vessel aortic arch.  The bilateral common carotid and the right internal carotid arteries are unremarkable.  There is mild calcific atherosclerosis at the origin of the left common carotid artery resulting in 20% stenosis by NASCET criteria.  The intracranial anterior circulation is otherwise unremarkable.  The bilateral vertebral arteries, basilar artery, and intracranial posterior circulation are unremarkable.  The right vertebral artery is dominant.    Skull:  Normal.    Scalp: Normal.    Orbits and face (included portions): Normal.    Paranasal sinuses and mastoid air cells (included portions): Normal.    Neck soft tissues: Normal.    Spine: Normal.  Lung apices: Demonstrate paraseptal emphysematous changes.              I personally reviewed the above image.    ASSESSMENT/PLAN:     Patient Active Problem List    Diagnosis Date Noted    Acute right MCA stroke 01/17/2017     Neuro:   - Admit to neuro ICU with q1 hour neuro and vital sign checks.  - Continuous tele and pulse ox  - HOB flat for 24 hrs.  - SBP goal of <180  - PRN hydralazine and labetalol. Cardene if needed.  - EKG, CXR, troponin, Echocardiogram.   - Euvolemia, euglycemia, euthermia, eumagnesemia, euphosphatemia, eukalemia for neuroprotection.   - A1C, TSH, LDL.  - NPO except meds for now.   - Holding antiplatelet medications.   - 24 hour CT prior to ASA.  - Atorvastatin 40mg po daily  - MRI brain in the AM.  - CTA unrevealing, no thrombectomy at this time.      IVNS at 75  NPO  Lyte replacement per unit protocol  Senna-docusate  Famotidine 20mg po BID  SCDs.    Uninterrupted Critical  Care/Counseling Time (not including procedures):      30 mins.    Chas Chisholm MD  House Officer IV [facility]  [facility]

## 2017-01-18 NOTE — ED TRIAGE NOTES
Patient presents to ED as a transfer from [facility]. Patient received TPA prior to arriving to [facility] ED ad arrives with midsternal chest pain.    EMS reports patient received 4.9 ml bolus dose of TPA,  and 44mL TPA infusion that completed over an hour prior to EMS arrival at [facility]. EMS reports patient received ativan at [facility], 5 of SL nitro enroute, 1 inch nitro paste to left chest, 200ml bolus due to hypotensive episode, and rates her pain as 6 - 8.     EMS reports no neurological changes while enroute to [facility] ED.     Patient reports that the numbness began at 1730 this tonight while she was sitting down.

## 2017-01-18 NOTE — ED NOTES
Spoke with neuro CC MD regarding patient headache and left message with echo lab re: echo to be done at bedside d/t TpA protocol

## 2017-01-18 NOTE — PLAN OF CARE
Problem: Occupational Therapy Goal  Goal: Occupational Therapy Goal  Goals to be met by: 1/25     Patient will increase functional independence with ADLs by performing:    UE Dressing with Supervision.  LE Dressing with Supervision.  Grooming while standing with Supervision.  Toileting from toilet with Supervision for hygiene and clothing management.   Rolling to Bilateral with Modified [place].   Supine to sit with Modified [place].  Stand pivot transfers with Supervision.  Outcome: Ongoing (interventions implemented as appropriate)  OT eval completed.

## 2017-01-18 NOTE — PT/OT/SLP EVAL
"Speech Language Pathology Evaluation    Shun Tidwell   MRN: [id number]   Admitting Diagnosis: Acute right MCA stroke    Diet recommendations: Solid Diet Level: Regular  Liquid Diet Level: Thin HOB to 90 degrees    SLP Treatment Date: 17  Speech Start Time: 835     Speech Stop Time: 855     Speech Total (min): 20 min       TREATMENT BILLABLE MINUTES:  Eval 12  and Eval Swallow and Oral Function 8    Diagnosis: Acute right MCA stroke      Past Medical History   Diagnosis Date    Anxiety     CAD (coronary artery disease), native coronary artery     Current smoker     Depression     HTN (hypertension), benign     Hyperlipidemia      Past Surgical History   Procedure Laterality Date    Femoral stents      Abdominal aortic stent      Cholecystectomy      Hysterectomy      Bladder suspension      Hernia repair         Has the patient been evaluated by SLP for swallowing? : Yes  Keep patient NPO?: No   General Precautions: Standard, aspiration          Social Hx: Patient lives with spouse    Prior diet: regular    Subjective:  " I sound better"    Pain Ratin/10     Pain Rating Post-Intervention: 0/10    Objective:        Oral Musculature Evaluation  Oral Musculature: WFL  Dentition: upper dentures  Mucosal Quality: good  Mandibular Strength and Mobility: WFL  Oral Labial Strength and Mobility: WFL  Lingual Strength and Mobility: WFL  Velar Elevation: WFL  Buccal Strength and Mobility: WFL  Volitional Cough: strong  Volitional Swallow: no delay  Voice Prior to PO Intake: wfl     Cognitive Status:  Behavioral Observations: alert and appropriate-  Memory and Orientation: Pt. was oriented x3 with good recall of recent and remote temporal and general information.  Immediate/delayed verbal recall was wfl with pt. Repeating 6 digits and 5 words without difficulty.    Attention: wfl  Problem Solving: Responses to hypothetical verbal problem solving tasks were accurate though concrete (pt. Has 7th grade ed " level).  Pt. Compared and contrasted objects and generated multiple solutions to problems.  Thought organization and categorization skills were impaired with pt. Naming 7 animals given one minute when 15-20 are wnl.    Pragmatics: wfl    Auditory Comprehension: Pt. Responded to complex yes/no questions and multi step commands with 100% accuracy and no delays in responding.        Verbal Expression: Verbal language skills were wfl with no evidence of aphasia.  Pt. Expressed their thoughts coherently in conversation with no evidence of confusion or word finding deficits      Motor Speech: wfl    Voice: wfl    Bedside Swallow Eval:  Consistencies Assessed: Thin liquids 1 cup, Puree 2 teaspoons and Solids 1 cracker  Oral Phase: WFL  Pharyngeal Phase: no overt clinical  signs/symptoms of aspiration and no overt clinical signs/symptoms of pharyngeal dysphagia    Additional Treatment:      FIM:  Social Interaction: 7 Complete [place]--The patient interacts appropriately with staff, other patients, and family members (e.g., controls temper, accepts criticism, is aware that words and actions have an impact on others), and does not require medication for   Problem Solvin Supervision--The patient requires supervision (e.g., cueing or coaxing) to solve less routine problems only under stressful or unfamiliar conditions, but no more than 10% of the time.   Comprehension: 7 Complete [place]--The patient understand complex or abstract directions and conversation, and understand either spoken or written language (not necessarily English).   Expression: 7 Complete [place]--The patient expresses complex or abstract ideas clearly and fluently (not necessarily in English).   Memory: 6 Modified [place]--The patient appears to have only mild difficulty recognizing people frequently encountered, remembering daily routines, and responding to requests of others.  The patient may use  self-initiated or environmental  cues, prompts,     Assessment:  Shun Tidwell is a 51 y.o. female with oral and pharyngeal phases of swallow wfl    Do you have any cultural, spiritual, Presybeterian conflicts, given your current situation?: no    Discharge recommendations: Discharge Facility/Level Of Care Needs:  (defer to pt/ot)     Goals:   SLP Goals        Problem: SLP Goal    Goal Priority Disciplines Outcome   SLP Goal     SLP Ongoing (interventions implemented as appropriate)   Description:  Goals due on 1/25  1.  Assess functional reading, writing visual spatial skills ( Pt. Has 7th grade ed armani)  2.  Further assess cognition to determine need for therapy and contact family to determine baseline.                   Plan:   Patient to be seen Therapy Frequency: 5 x/week   Plan of Care expires: 02/16/17  Plan of Care reviewed with: patient  SLP Follow-up?: Yes  SLP - Next Visit Date: 01/19/17           Alicia Hernandez MA, CCC-SLP  01/18/2017

## 2017-01-18 NOTE — TELEMEDICINE CONSULT
[facility]  Tele-Consult    Consultation started: 1/17/2017 at 6:48 PM   Consulting Provider:  Tamara  The patient location is [facility] Emergency Department  Spoke hospital nurse at bedside with patient assisting consultant.     Subjective:   Subjective   History of Present Illness:  Shun Tidwell is a 51 y.o. female who presents with left sided numbness and weakness.    At 5-530 started with numbness and tingling to the left side of her face and states that her left hand curled up and unable to straighten it out. Currently in the ED her strength seems to have improved somewhat.   These symptoms have never happened to her before. There are no associated symptoms. She has not been treated. Takes plavix regularly.    Wake up Stroke?: no  Last known normal:  1730  Recent bleeding noted: no  Does the patient take any Blood Thinners? no           H&P was obtained from patient.  Past Medical History: anxiety, depression, HLD, HTN, CAD    Medications: No current outpatient prescriptions on file.    Allergies: Review of patient's allergies indicates:  Allergies not on file    Objective:     Vitals: There were no vitals filed for this visit.     Objective   Physical Exam:  General: well developed, well nourished   HENT: Head:normocephalic and atraumatic   HENT: Ears: right ear normal and left ear normal  Nose: normal nares and no discharge  Eyes:conjunctivae/corneas clear. PERRL.  Neck: normal, no obvious masses and trachea to midline  Lungs: normal respiratory effort  Cardiovascular: Heart: regular rate and rhythm   Cardiovascular: Extremities: no cyanosis, no edema and no clubbing  Abdomen: appears normal and not distended  Skin:  skin color and texture normal, no rash and no bruises  Musculoskeletal: normal range of motion in all extremities       Imaging Notes: No hemmorhage. No mass effect. No early infarct signs. Impression performed at: 1905    NIH Stroke Scale:  Interval: baseline  (upon arrival/admit)  Level of Consciousness: 0 - alert  LOC Questions: 0 - answers both correctly  LOC Commands: 0 - performs both correctly  Best Gaze: 0 - normal  Visual: 0 - no visual loss  Facial Palsy: 0 - normal  Motor Left Arm: 1 - drift  Motor Right Arm: 0 - no drift  Motor Left Le - drift  Motor Right Le - no drift  Limb Ataxia: 0 - absent  Sensory: 1 - mild to moderate loss  Best Language: 0 - no aphasia  Dysarthria: 1 - mild to moderate dysarthria  Extinction and Inattention: 0 - no neglect  NIH Stroke Scale Total: 4        Assessment - Diagnosis - Goals:     Plan     Diagnosis/Impression: acute ischemic stroke, most likely small vessel w/ left hemisensorimotor deficit w/ dysarthria    TPA Recommendation:   tPA Recommendation   tPA Total Dose:          Bolus Dose       Intravenously over 1 minute (10% of Total Dose)   Infusion Dose       Continuous Infusion over 60 Minutes     Additional Recommendations:   1. Neurological assessment and vital signs (except temperature) every 15 minutes during tPA infusion.  2. Frequency of BP assessments may need to be increased if systolic BP stays >= 180 mm Hg or diastolic BP stays >= 105 mm Hg. Administer antihypertensive meds as ordered  3. Continue to monitor and control blood pressure and monitor for neurological deterioration every 15 minutes for the first hour after the infusion is stopped. Then every 30 minutes for the next 6 hours. Perform hourly monitoring from the 8th post-infusion hour until 24 hours post-infusion.  4. Temperature every 4 hours or as required.  5. Follow hospital protocol for further orders re: post tPA infusion patient management.  6. No antithrombotics or anticoagulants (including but not limited to: heparin, warfarin, aspirin, clopidigrel, or dipyridamole) for 24 hours, then start antithrombotics as ordered by treating physician    Adapted from the American Heart Association/American Stroke Association (AHA/ASA) and American  Association of Neuroscience Nurses (AANN) Guidelines.  Recommendation given at: 1906    Recommendation: NPO until after pass dysphagia screen. Diagnostic studies: CTA Head to assess vasculature , CTA Neck/Arch to assess vasculature, HgbA1C to assess blood glucose levels, Lipid Profile to assess cholesterol levels, MRI head without contrast to assess brain parenchyma, Trans-thoracic cardiac echo to assess cardiac function/status  Consults: Rehab Consult; Occupational Therapy, Rehab Consult; Physical Therapy and Rehab Consult; Speech Therapy  Antithrombotics: Hold all Antithrombotics x 24 hrs after IV t-PA given  Statins: Atorvastatin- 40 mg oral daily    Disposition Recommendation:  transfer to [facility] by  ground  standard       Possible Interventional Revascularization Candidate? No; No significant neurological deficit    Recommended the emergency room physician to have a brief discussion with the patient and/or family if available regarding the risks and benefits of treatment, and to briefly document the occurrence of that discussion in his clinical encounter note.     The attending portion of this evaluation, treatment, and documentation was performed per Yadiel Booth via audiovisual.      Billing code:  (moderate to severe stroke, large areas of edema, some mimics)   · This patient has a critical neurological condition/illness, with high morbidity and mortality.  · There is a high probability for acute neurological change leading to clinical and possibly life-threatening deterioration requiring highest level of physician preparedness for urgent intervention.  · Care was coordinated with other physicians involved in the patient's care.  · Radiologic studies and laboratory data were reviewed and interpreted, and plan of care was re-assessed based on the results.  · Diagnosis, treatment options and prognosis may have been discussed with the patient and/or family members or caregiver.  Further  advanced medical management and further evaluation is warranted for his care.      Consultation ended: 1/17/2017 at 1910    Michele Booth MD  Vascular Neurology

## 2017-01-18 NOTE — ED PROVIDER NOTES
Encounter Date: 1/17/2017    SCRIBE #1 NOTE: I, Adarsh Delgado, am scribing for, and in the presence of,  Dr. Chavez. I have scribed the entire note.       History     Chief Complaint   Patient presents with    Transfer- TPA/CVA     Pt presents to the ED via EMS as a transfer from [facility]. Pt reportedly began with left sided weakness and numbness around 1730. Staff from previous facility reports pt received 4.9mg bolus at 1925 and 44.2mg infusion at 1927.      Review of patient's allergies indicates:  Allergies not on file  HPI Comments: Time patient was seen by the provider: 10:34 PM    The patient is a 51 y.o. female with hx of: HTN, and PVD who at 5:30 PM complained of sudden onset left facial and arm numbness, speech difficulty, and left hand weakness and was evaluated at [facility]. Initial head CT done there was negative so she was given TPA and transferred to this ED for further evaluation. Pt states symptoms have not improved with TPA received at outside facility. Pt also complains of constant central chest pain since this afternoon, and has been treated with 5 sublingual NTG en route with EMS with minimal improvement. Chest pain worsens with deep inspiration, unclear if it worsens with activity. She notes an associated dry cough, no fevers or congestion. No known history of MI, pt reports negative stress test last year.    The history is provided by the patient, medical records and the EMS personnel.     Past Medical History   Diagnosis Date    Anxiety     CAD (coronary artery disease), native coronary artery     Current smoker     Depression     HTN (hypertension), benign     Hyperlipidemia      No past medical history pertinent negatives.  Past Surgical History   Procedure Laterality Date    Femoral stents      Abdominal aortic stent      Cholecystectomy      Hysterectomy      Bladder suspension      Hernia repair       No family history on file.  Social History   Substance Use  Topics    Smoking status: Current Every Day Smoker     Packs/day: 2.00     Types: Cigarettes    Smokeless tobacco: Never Used    Alcohol use None     Review of Systems   Constitutional: Negative for fever.   HENT: Negative for sore throat.    Respiratory: Positive for cough.    Cardiovascular: Positive for chest pain.   Gastrointestinal: Negative for nausea.   Genitourinary: Negative for dysuria.   Musculoskeletal: Negative for back pain.   Skin: Negative for rash.   Neurological:        Left facial numbness, left arm numbness, speech difficulty, hand weakness   Hematological: Does not bruise/bleed easily.       Physical Exam   Initial Vitals   BP Pulse Resp Temp SpO2   01/17/17 2227 01/17/17 2227 01/17/17 2227 01/17/17 2227 01/17/17 2227   125/79 68 18 98.9 °F (37.2 °C) 97 %     Physical Exam    Nursing note and vitals reviewed.  Constitutional: No distress.   HENT:   Mouth/Throat: Oropharynx is clear and moist. No oropharyngeal exudate.   Neck: Normal range of motion. Neck supple.   Cardiovascular: Normal rate, regular rhythm and normal heart sounds.   No murmur heard.  Pulmonary/Chest: Breath sounds normal. She has no wheezes. She has no rhonchi. She has no rales.   Abdominal: Soft. There is no tenderness. There is no rebound and no guarding.   Musculoskeletal: She exhibits no edema.   Neurological: She is alert and oriented to person, place, and time. A sensory deficit is present.   4+/5 LUE and LLE strength. Decreased sensation to left face, arm, and leg. No facial droop   Skin: Skin is warm and dry.         ED Course   Procedures  Labs Reviewed   CBC W/ AUTO DIFFERENTIAL - Abnormal; Notable for the following:        Result Value    Platelets 145 (*)     All other components within normal limits   COMPREHENSIVE METABOLIC PANEL - Abnormal; Notable for the following:     Calcium 8.6 (*)     Albumin 3.3 (*)     Alkaline Phosphatase 51 (*)     ALT 53 (*)     Anion Gap 6 (*)     All other components within normal  limits   PROTIME-INR   TROPONIN I   LIPID PANEL   HEMOGLOBIN A1C   TSH   TROPONIN I   TROPONIN I   CK-MB   PROTIME-INR   APTT   URINALYSIS   TYPE & SCREEN   POCT GLUCOSE   POCT GLUCOSE MONITORING CONTINUOUS   POCT GLUCOSE MONITORING CONTINUOUS          X-Rays:   Independently Interpreted Readings:   Chest X-Ray: Normal heart size.  No infiltrates.  No acute abnormalities.     Medical Decision Making:   History:   Old Medical Records: I decided to obtain old medical records.  Old Records Summarized: records from another hospital.  Initial Assessment:       History of PVD, HTN, transfers from outside facility after acute onset of left sided numbness and weakness. Treated with TPA after negative CT done there. Labs done there reviewed and no acute abnormalities. No improvement in L sided symptoms with TPA, still with diffuse decreased sensation on L side and 4/5 LUE/LLE strength, no facial droop. Will get emergent CTA to determine if IR candidate for thrombectomy,and admit to neuro ICU. She has had constant chest pain but negative cardiac work up there. Will repeat EKG and troponin now.    Update:  CTA with no acute occlusions. Labs with (-) trop and otherwise no acute abnormalities.   Seen by vascular neuro, and admitted to neuro ICU for observation.    Clinical Tests:   Lab Tests: Ordered and Reviewed  Radiological Study: Reviewed and Ordered  Medical Tests: Ordered and Reviewed  Other:   I have discussed this case with another health care provider.            Scribe Attestation:   Scribe #1: I performed the above scribed service and the documentation accurately describes the services I performed. I attest to the accuracy of the note.    Attending Attestation:         Attending Critical Care:   Critical Care Times:   Direct Patient Care (initial evaluation, reassessments, and time considering the case)................................................................18 minutes.   Additional History from reviewing old  medical records or taking additional history from the family, EMS, PCP, etc.......................7 minutes.   Ordering, Reviewing, and Interpreting Diagnostic Studies...............................................................................................................5 minutes.   Documentation..................................................................................................................................................................................5 minutes.   Consultation with other Physicians. .................................................................................................................................................5 minutes.   ==============================================================  · Total Critical Care Time - exclusive of procedural time: 40 minutes.  ==============================================================  Critical Care Condition: potentially life-threatening   Critical Care Comments: CVA, tPA transfer, emergent evaluation and stroke team coordination, CTA     Physician Attestation for Scribe:  Physician Attestation Statement for Scribe #1: I, Dr. Chavez, reviewed documentation, as scribed by Adarsh Delgado in my presence, and it is both accurate and complete.                 ED Course     Clinical Impression:   The encounter diagnosis was Acute right MCA stroke.          Loki Chavez MD  01/18/17 0039

## 2017-01-18 NOTE — ED NOTES
Patient reports she wishes to use the bathroom. RN informed patient that using the bedpan would be the safest option. Patient denied stated that she would like to use the bathroom on her own. Patient ambulated to the bathroom with assistance of ED staff. Unable to collect urine specimen as patient did not void into specimen cup. Will conduct neuro assessment and vitals once patient is done using the bathroom.

## 2017-01-18 NOTE — ED NOTES
Informed Neuro CC MD of patient's new onset headache. MD acknowledged and verbalized understanding.

## 2017-01-18 NOTE — SUBJECTIVE & OBJECTIVE
Past Medical History   Diagnosis Date    Anxiety     CAD (coronary artery disease), native coronary artery     Current smoker     Depression     HTN (hypertension), benign     Hyperlipidemia      Past Surgical History   Procedure Laterality Date    Femoral stents      Abdominal aortic stent      Cholecystectomy      Hysterectomy      Bladder suspension      Hernia repair       No family history on file.  Social History   Substance Use Topics    Smoking status: Current Every Day Smoker     Packs/day: 2.00     Types: Cigarettes    Smokeless tobacco: Never Used    Alcohol use None     Review of patient's allergies indicates:   Allergen Reactions    Bentyl [dicyclomine]     Clindamycin     Macrobid [nitrofurantoin monohyd/m-cryst]     Percocet [oxycodone-acetaminophen]     Thorazine [chlorpromazine]     Topamax [topiramate]     Tramadol      Medications: I have reviewed the current medication administration record.      (Not in a hospital admission)    Review of Systems   Constitutional: Negative for chills and fever.   HENT: Negative for ear discharge and ear pain.    Eyes: Negative for pain and itching.   Respiratory: Negative for cough and choking.    Cardiovascular: Positive for chest pain.   Gastrointestinal: Negative for abdominal pain and constipation.   Endocrine: Negative for polyphagia and polyuria.   Genitourinary: Negative for difficulty urinating and dysuria.   Musculoskeletal: Negative for back pain and gait problem.   Skin: Negative for rash and wound.   Allergic/Immunologic: Negative for immunocompromised state.   Neurological: Positive for weakness, numbness and headaches.   Psychiatric/Behavioral: Negative for agitation and confusion.     Objective:     Vital Signs (Most Recent):  Temp: 98.9 °F (37.2 °C) (01/17/17 2227)  Pulse: (!) 58 (01/17/17 2327)  Resp: 18 (01/17/17 2327)  BP: 109/67 (01/17/17 2327)  SpO2: 98 % (room air) (01/17/17 2327)    Vital Signs Range (Last  24H):  Temp:  [98.9 °F (37.2 °C)]   Pulse:  [54-68]   Resp:  [16-18]   BP: (109-125)/(67-79)   SpO2:  [97 %-100 %]     Physical Exam   Constitutional: She appears well-developed and well-nourished.   HENT:   Head: Normocephalic and atraumatic.   Eyes: EOM are normal. Pupils are equal, round, and reactive to light.   Neck: Normal range of motion.   Cardiovascular: Normal rate.    Pulmonary/Chest: Effort normal.   Abdominal: Soft. Bowel sounds are normal.   Musculoskeletal: Normal range of motion.   Neurological: She is alert. GCS eye subscore is 4 - spontaneous. GCS verbal subscore is 5 - oriented. GCS motor subscore is 6 - obeys commands.   Skin: Skin is warm and dry.   Psychiatric: She has a normal mood and affect.   Nursing note and vitals reviewed.      Neurological Exam:   LOC: alert and follows requests  Language: No aphasia  Speech: No dysarthria  Orientation: Person, Place, Time  Memory: Recent memory intact, Remote memory intact, Age correct, Month correct  Visual Fields (CN II): Full  EOM (CN III, IV, VI): Full/intact  Oculocephalics: normal  Pupils (CN III, IV, VI): PERRL  Facial Sensation (CN V): Symmetric, Corneal reflex intact  Facial Movement (CN VII): symmetric facial expression  Hearing (CN VIII): intact bilaterally  Gag Reflex (CN IX, X): normal/symmetric  Shoulder/Neck (CN XI): SCM-Left: Normal ; SCM-Right: Normal ; Shoulder Shrug: Normal/Symetric  Tongue (CN XII): to midline  Reflexes: flexor plantar responses bilaterally  Motor*: Arm Left:  Paretic:  4/5, Leg Left:   Paretic:  4/5, Arm Right:   Normal (5/5), Leg Right:   Normal (5/5)  Cerebellar*: Not testable due to laying on stretcher  Sensation: decrease sensation on the left  Tone: Arm-Left: normal; Leg-Left: normal; Arm-Right: normal; Leg-Right: normal    Stroke Team Times:   Date and Time Taken: 1/17/2017 11:43 PM  Last Known Normal Date and Time : 1/17/201717:30  Symptom Onset Date and Time:1/17/201717:30  Stroke Team Called Date and Time:  201718:48  Stroke Team Arrived Date and Time: 201722:40  CT Interpretation Time: 18:36  Intervention Time: 19:25 (TPA)  NIH Stroke Scale:  Interval: baseline (upon arrival/admit)  Level of Consciousness: 0 - alert  LOC Questions: 0 - answers both correctly  LOC Commands: 0 - performs both correctly  Best Gaze: 0 - normal  Visual: 0 - no visual loss  Facial Palsy: 0 - normal  Motor Left Arm: 1 - drift  Motor Right Arm: 0 - no drift  Motor Left Le - no drift  Motor Right Le - no drift  Limb Ataxia: 0 - absent  Sensory: 1 - mild to moderate loss  Best Language: 0 - no aphasia  Dysarthria: 0 - normal articulation  Extinction and Inattention: 0 - no neglect  NIH Stroke Scale Total: 2  [place] Coma Scale:  Best Eye Response: 4 - spontaneous  Best Motor Response: 6 - obeys commands  Best Verbal Response: 5 - oriented  Sanjay Coma Scale Total: 15  Modified [place] Scale:   Timeline: Prior to symptoms onset  Modified Sony Score: 0 - no symptoms        Laboratory:  CMP:   Recent Labs  Lab 17  2244   CALCIUM 8.6*   ALBUMIN 3.3*   PROT 6.0      K 4.1   CO2 25      BUN 10   CREATININE 0.7   ALKPHOS 51*   ALT 53*   AST 25   BILITOT 0.4     CBC:   Recent Labs  Lab 174   WBC 5.06   RBC 4.08   HGB 12.6   HCT 37.1   *   MCV 91   MCH 30.9   MCHC 34.0     Lipid Panel: No results for input(s): CHOL, LDLCALC, HDL, TRIG in the last 168 hours.  Coagulation:   Recent Labs  Lab 17  2244   INR 1.1     Platelet Aggregation Study: No results for input(s): PLTAGG, PLTAGINTERP, PLTAGREGLACO, ADPPLTAGGREG in the last 168 hours.  Hgb A1C: No results for input(s): HGBA1C in the last 168 hours.  TSH: No results for input(s): TSH in the last 168 hours.    Diagnostic Results:  Brain Imaging:   CTA Head and neck with multiphasic 17 results:      Cardiac Evaluation:   EKG/Telemetry: 17 unconfirmed results:  Sinus bradycardia  Otherwise normal ECG  When compared with ECG of  17-JAN-2017 23:12,  No significant change was found

## 2017-01-18 NOTE — PT/OT/SLP EVAL
"Physical Therapy  Evaluation    Shun Tidwell   MRN: [id number]   Admitting Diagnosis: Acute right MCA stroke    PT Received On: 01/18/17  PT Start Time: 1009     PT Stop Time: 1029    PT Total Time (min): 20 min       Billable Minutes:  Evaluation 20    Diagnosis: Acute right MCA stroke  S/p tPA    Past Medical History   Diagnosis Date    Anxiety     CAD (coronary artery disease), native coronary artery     Current smoker     Depression     HTN (hypertension), benign     Hyperlipidemia       Past Surgical History   Procedure Laterality Date    Femoral stents      Abdominal aortic stent      Cholecystectomy      Hysterectomy      Bladder suspension      Hernia repair         Referring physician: Chas Chisholm MD  Date referred to PT: 1/17/17    General Precautions: Standard, aspiration, fall, NPO  Orthopedic Precautions: N/A   Braces: N/A       Patient History:  Lives With: child(nick), adult  Living Arrangements: house  Home Layout: Able to live on 1st floor  Living Environment Comment: Pt states living in [place], [place] with her adult daughter in a 1SH with no ANA. Pt states being independent with all mobility and ADLs PTA with no DME. Pt does not work or drive. Pt is R handed. Pt wears bifocals.   Equipment Currently Used at Home: none  DME owned (not currently used): none    Previous Level of Function:  Ambulation Skills: independent  Transfer Skills: independent  ADL Skills: independent    Subjective:  Communicated with RN prior to session.  "I haven't eaten anything and I haven't gotten any of my morning medications yet."  Chief Complaint: left sided weakness  Patient goals: none stated    Pain Rating:  (pt c/o HA did not grade pain. )     Objective:   Patient found with: telemetry, blood pressure cuff, pulse ox (continuous), peripheral IV     Cognitive Exam:  Oriented to: Person, Place, Time and Situation    Follows Commands/attention: Follows two-step commands  Communication: " "clear/fluent  Safety awareness/insight to disability: intact    Physical Exam:  Postural examination/scapula alignment: Rounded shoulder, Head forward and Posterior pelvic tilt    Skin integrity: Visible skin intact  Edema: None noted    Sensation:   Intact RUE and RLE light touch. Impaired LUE and LLE light touch, pt reports being able to feel light touch, but is lighter than right side.    Lower Extremity Range of Motion:  Right Lower Extremity: WFL  Left Lower Extremity: WFL    Lower Extremity Strength:  Right Lower Extremity: WFL  Left Lower Extremity: grossly 3/5     Functional Mobility:  Bed Mobility:  Supine to Sit: Contact Guard Assistance (from left side of stretcher, pt using PT's hand to elevate trunk)  Sit to Supine: Stand by Assistance    Transfers:  Sit <> Stand Assistance: Contact Guard Assistance (x1 from EOB)  Sit <> Stand Assistive Device: No Assistive Device    Gait:   Gait Distance: 10ft within room, limited by lines and tPA window. Pt with narrow KATHE. LUE HHA for balance  Assistance 1: Minimum assistance  Gait Assistive Device: Hand held assist  Gait Pattern: reciprocal  Gait Deviation(s): decreased emili, increased time in double stance, decreased step length    Tinetti Gait and Balance Assessment     Assistive Device  Normally Used: No  Assistive Device During Testing: No       Balance Tests:  1. Sitting Balance:          Steady; safe = 1  2. Arises :        Able, uses arms to help = 1  3. Attempts to arise :        Able to arise, 1 attempt = 2  4. Immediate standing Balance :        Steady but uses walker or other support = 1  5. Standing balance:         Steady but wide stance (medal heel>4" apart) & uses cane or other support = 1  6. Nudged :        Begins to fall = 0  7. Eyes closed:         Steady = 1  8. Turning 360 degrees:         Continuous = 1 and Steady = 1  9.Sitting Down :         Uses arms or not a smooth motion = 1       Balance Score  9/16    Gait Tests:  10. Initiation of " "Gait:         Any hesitancy or multiple attempts to start = 0  11. Step length and height :        Right swing foot:  Passes left stance foot = 1 and Completely clears floor with step = 1 and Left swing foot:  Passes left stance foot = 1 and Completely clears floor with step = 1  12. Step symmetry         Right & Left step length appear equal = 1  13. Step continuity :        Steps appear continuous = 1  14. Path :        Straight without walking aid = 2  15. Trunk :          No sway, but flexion of knees or back or spreads arm out while walking = 1  16. Walking stance width          Heels amost touching while walking = 1       Gait Score:    10/12     Balance + Gait Score: 19 /28       25-28= Low fall risk  19-24 Medium fall risk  < 19= High fall risk    Therapeutic Activities and Exercises:  Provided education to pt and family regarding role of PT, PT POC and need for continued therapy for left sided weakness. Pt asks "Why did this happen to me?" PT answered questions within scope of practice and to patient's satisfaction. Pt states living a sedentary lifestyle and continues to smoke. Educated pt that those are two risk factors which would require modification to aide in reduction of likelihood of a stroke. Pt verbalized understanding.    AM-PAC 6 CLICK MOBILITY  How much help from another person does this patient currently need?   1 = Unable, Total/Dependent Assistance  2 = A lot, Maximum/Moderate Assistance  3 = A little, Minimum/Contact Guard/Supervision  4 = None, Modified [place]/Independent    Turning over in bed (including adjusting bedclothes, sheets and blankets)?: 3  Sitting down on and standing up from a chair with arms (e.g., wheelchair, bedside commode, etc.): 3  Moving from lying on back to sitting on the side of the bed?: 3  Moving to and from a bed to a chair (including a wheelchair)?: 3  Need to walk in hospital room?: 3  Climbing 3-5 steps with a railing?: 3  Total Score: 18     AM-PAC Raw " Score CMS G-Code Modifier Level of Impairment Assistance   6 % Total / Unable   7 - 9 CM 80 - 100% Maximal Assist   10 - 14 CL 60 - 80% Moderate Assist   15 - 19 CK 40 - 60% Moderate Assist   20 - 22 CJ 20 - 40% Minimal Assist   23 CI 1-20% SBA / CGA   24 CH 0% Independent/ Mod I     Patient left supine with all lines intact, call button in reach, RN notified and family present.    Assessment:   Shun Tidwell is a 51 y.o. female with a medical diagnosis of Acute right MCA stroke and s/p tPA. Pt presents with LUE and LLE weakness, decreased left sided sensation, decreased functional mobility and balance. Pt also presents with left facial weakness. Pt scored a 19/28 on the Tinetti Balance Test, categorizing her as a medium fall risk due to poor standing balance. Pt 's deficits affect her ability to be independent caretaker of self and able to participate in safe and independent mobility. Ms. Tidwell continues to benefit from PT services due to the following limitations: Fall Risk, Unable to participate in daily activities, Requires skilled supervision to complete and progress HEP and Weakness. Pt would benefit from HHPT upon discharge to improve quality of life and focus on recovery of below impairments.     Rehab identified problem list/impairments: Rehab identified problem list/impairments: weakness, impaired self care skills, impaired functional mobilty, gait instability, impaired balance, decreased upper extremity function, decreased lower extremity function    Rehab potential is good.    Activity tolerance: Good    Discharge recommendations: Discharge Facility/Level Of Care Needs: home health PT     Barriers to discharge: Barriers to Discharge: None    Equipment recommendations: Equipment Needed After Discharge: none     GOALS:   Physical Therapy Goals        Problem: Physical Therapy Goal    Goal Priority Disciplines Outcome Goal Variances Interventions   Physical Therapy Goal     PT/OT, PT Ongoing  (interventions implemented as appropriate)     Description:  Goals to be met by: 1/25/17     Patient will increase functional independence with mobility by performing:    Supine to sit with Modified [place]. NOT MET  Sit to supine with Modified [place]. NOT MET  Sit to stand transfer with Supervision using No Assistive Device and Rolling Walker. NOT MET  Bed to chair transfer via Stand Pivot with Supervision using No Assistive Device. NOT MET  Gait  x 150 feet with Stand-by Assistance using No Assistive Device and Rolling Walker. NOT MET  Stand for 15 minutes with Supervision using No Assistive Device with moderate balance challenges. NOT MET  Able to tolerate exercise for 15-20 reps with independence. NOT MET  Patient and family able to teachback stroke education independently. NOT MET                PLAN:    Patient to be seen 6 x/week to address the above listed problems via gait training, therapeutic activities, therapeutic exercises, neuromuscular re-education  Plan of Care expires: 02/17/17  Plan of Care reviewed with: patient, family    Sharona Brush PT, DPT  1/18/2017  [phone]

## 2017-01-18 NOTE — CONSULTS
[facility]  Vascular Neurology  Comprehensive Stroke Center  Consult Note      Inpatient consult to Vascular (Stroke) Neurology  Consult performed by: LEONA HANCOCK  Consult ordered by: SANDY ROMO  Reason for consult: s/p tpa    Inpatient consult to Vascular (Stroke) Neurology  Consult performed by: LEONA HANCOCK  Consult ordered by: LIBRADO HAMILTON  Reason for consult: s/p tpa        Subjective:     History of Present Illness:  52 y/o female who at 1730 started with sudden onset of numbness to left face and arm and speech difficulty. Patient was take to [facility] and due to persistent symptoms telemedicine call was done with  Dr Booth and with lacey VÁZQUEZ head recommendations were for IVtPA to be given. TPA given and patient transferred to [facility]. Patient c/o chest pain before transfer and SL nitro was given as well as nitro paste and then NS bolus given due to hypotension.  Upon arrival patient still with c/o left face and arm numbness and leg, weakness on left improved. C/o blurry vision. And had a headache.  Risk factors are HTN, CAD,              Past Medical History   Diagnosis Date    Anxiety     CAD (coronary artery disease), native coronary artery     Current smoker     Depression     HTN (hypertension), benign     Hyperlipidemia      Past Surgical History   Procedure Laterality Date    Femoral stents      Abdominal aortic stent      Cholecystectomy      Hysterectomy      Bladder suspension      Hernia repair       No family history on file.  Social History   Substance Use Topics    Smoking status: Current Every Day Smoker     Packs/day: 2.00     Types: Cigarettes    Smokeless tobacco: Never Used    Alcohol use None     Review of patient's allergies indicates:   Allergen Reactions    Bentyl [dicyclomine]     Clindamycin     Macrobid [nitrofurantoin monohyd/m-cryst]     Percocet [oxycodone-acetaminophen]     Thorazine [chlorpromazine]      Topamax [topiramate]     Tramadol      Medications: I have reviewed the current medication administration record.      (Not in a hospital admission)    Review of Systems   Constitutional: Negative for chills and fever.   HENT: Negative for ear discharge and ear pain.    Eyes: Negative for pain and itching.   Respiratory: Negative for cough and choking.    Cardiovascular: Positive for chest pain.   Gastrointestinal: Negative for abdominal pain and constipation.   Endocrine: Negative for polyphagia and polyuria.   Genitourinary: Negative for difficulty urinating and dysuria.   Musculoskeletal: Negative for back pain and gait problem.   Skin: Negative for rash and wound.   Allergic/Immunologic: Negative for immunocompromised state.   Neurological: Positive for weakness, numbness and headaches.   Psychiatric/Behavioral: Negative for agitation and confusion.     Objective:     Vital Signs (Most Recent):  Temp: 98.9 °F (37.2 °C) (01/17/17 2227)  Pulse: (!) 58 (01/17/17 2327)  Resp: 18 (01/17/17 2327)  BP: 109/67 (01/17/17 2327)  SpO2: 98 % (room air) (01/17/17 2327)    Vital Signs Range (Last 24H):  Temp:  [98.9 °F (37.2 °C)]   Pulse:  [54-68]   Resp:  [16-18]   BP: (109-125)/(67-79)   SpO2:  [97 %-100 %]     Physical Exam   Constitutional: She appears well-developed and well-nourished.   HENT:   Head: Normocephalic and atraumatic.   Eyes: EOM are normal. Pupils are equal, round, and reactive to light.   Neck: Normal range of motion.   Cardiovascular: Normal rate.    Pulmonary/Chest: Effort normal.   Abdominal: Soft. Bowel sounds are normal.   Musculoskeletal: Normal range of motion.   Neurological: She is alert. GCS eye subscore is 4 - spontaneous. GCS verbal subscore is 5 - oriented. GCS motor subscore is 6 - obeys commands.   Skin: Skin is warm and dry.   Psychiatric: She has a normal mood and affect.   Nursing note and vitals reviewed.      Neurological Exam:   LOC: alert and follows requests  Language: No  aphasia  Speech: No dysarthria  Orientation: Person, Place, Time  Memory: Recent memory intact, Remote memory intact, Age correct, Month correct  Visual Fields (CN II): Full  EOM (CN III, IV, VI): Full/intact  Oculocephalics: normal  Pupils (CN III, IV, VI): PERRL  Facial Sensation (CN V): Symmetric, Corneal reflex intact  Facial Movement (CN VII): symmetric facial expression  Hearing (CN VIII): intact bilaterally  Gag Reflex (CN IX, X): normal/symmetric  Shoulder/Neck (CN XI): SCM-Left: Normal ; SCM-Right: Normal ; Shoulder Shrug: Normal/Symetric  Tongue (CN XII): to midline  Reflexes: flexor plantar responses bilaterally  Motor*: Arm Left:  Paretic:  4/5, Leg Left:   Paretic:  4/5, Arm Right:   Normal (5/5), Leg Right:   Normal (5/5)  Cerebellar*: Not testable due to laying on stretcher  Sensation: decrease sensation on the left  Tone: Arm-Left: normal; Leg-Left: normal; Arm-Right: normal; Leg-Right: normal    Stroke Team Times:   Date and Time Taken: 2017 11:43 PM  Last Known Normal Date and Time : 201717:30  Symptom Onset Date and Time:201717:30  Stroke Team Called Date and Time: 201718:48  Stroke Team Arrived Date and Time: 201722:40  CT Interpretation Time: 18:36  Intervention Time: 19:25 (TPA)  NIH Stroke Scale:  Interval: baseline (upon arrival/admit)  Level of Consciousness: 0 - alert  LOC Questions: 0 - answers both correctly  LOC Commands: 0 - performs both correctly  Best Gaze: 0 - normal  Visual: 0 - no visual loss  Facial Palsy: 0 - normal  Motor Left Arm: 1 - drift  Motor Right Arm: 0 - no drift  Motor Left Le - no drift  Motor Right Le - no drift  Limb Ataxia: 0 - absent  Sensory: 1 - mild to moderate loss  Best Language: 0 - no aphasia  Dysarthria: 0 - normal articulation  Extinction and Inattention: 0 - no neglect  NIH Stroke Scale Total: 2  [place] Coma Scale:  Best Eye Response: 4 - spontaneous  Best Motor Response: 6 - obeys commands  Best Verbal Response: 5 -  oriented  [place] Coma Scale Total: 15  Modified Sequoyah Scale:   Timeline: Prior to symptoms onset  Modified Sequoyah Score: 0 - no symptoms        Laboratory:  CMP:   Recent Labs  Lab 01/17/17  2244   CALCIUM 8.6*   ALBUMIN 3.3*   PROT 6.0      K 4.1   CO2 25      BUN 10   CREATININE 0.7   ALKPHOS 51*   ALT 53*   AST 25   BILITOT 0.4     CBC:   Recent Labs  Lab 01/17/17  2244   WBC 5.06   RBC 4.08   HGB 12.6   HCT 37.1   *   MCV 91   MCH 30.9   MCHC 34.0     Lipid Panel: No results for input(s): CHOL, LDLCALC, HDL, TRIG in the last 168 hours.  Coagulation:   Recent Labs  Lab 01/17/17  2244   INR 1.1     Platelet Aggregation Study: No results for input(s): PLTAGG, PLTAGINTERP, PLTAGREGLACO, ADPPLTAGGREG in the last 168 hours.  Hgb A1C: No results for input(s): HGBA1C in the last 168 hours.  TSH: No results for input(s): TSH in the last 168 hours.    Diagnostic Results:  Brain Imaging:   CTA Head and neck with multiphasic 1-17-17 results:      Cardiac Evaluation:   EKG/Telemetry: 1-17-17 unconfirmed results:  Sinus bradycardia  Otherwise normal ECG  When compared with ECG of 17-JAN-2017 23:12,  No significant change was found    Assessment/Plan:     50 y/o female with left side numbness, blurry vision, headache and weakness s/p TPA on 1-17-17 at 1925 from [facility]. Possible Small vessel disease as no LVO seen on CTA multiphasic.      * Acute right MCA stroke  50 y/o female with left side numbness and weakness  Antithrombotics for secondary stroke prevention: None: Reason:  Hold all Antithrombotics x 24 hours after IV t-PA administration, May begin Aspirin on 1-18-17 at 2100 if no conversion  Statins  Atorvastatin- 40 mg oral daily,   Aggressive risk factor modification: Hypertension and High Cholesterol,   Rehab Efforts: Physical Therapy, Occupational Therapy and Speech and Language Pathology,   Diagnostics: Ordered/Pending HgbA1C to assess blood glucose levels, MRI head without contrast to assess brain  parenchyma, Trans-thoracic cardiac echo to assess cardiac function/status,   VTE Prophylaxis: None: Reason for No Pharmacological VTE Prophylaxis: Mechanical prophylaxis: Place SCDs, May begin Heparin 1-18-17 at 2200 if no conversion  BP Parameters: SBP <180  Nursing Orders: Neuro checks- Q1H, Swallowing evaluation by Nursing, Seizure precautions, Out of bed BID, Avoid Goel catheter, Pneumatic compression device, Stroke Education, Blood glucose target 100-130, Up with assistance    Left hemiparesis  Aggressive therapy    Hyperlipidemia  Lipitor 40 mg daily      HTN (hypertension), benign  S/p tpa so SBP <180    Tissue plasminogen activator (t-PA) administered at other facility within 24 hours prior to current admission  Close monitoring in [facility]      Thrombolysis Candidate? Yes. Recieved at [facility]    Interventional Revascularization Candidate?  No; No large vessel occlusion    Research Candidate? No        Mitzi Trejo NP  [facility]  Department of Vascular Neurology   [facility]

## 2017-01-18 NOTE — ASSESSMENT & PLAN NOTE
52 y/o female with left side numbness and weakness  Antithrombotics for secondary stroke prevention: None: Reason:  Hold all Antithrombotics x 24 hours after IV t-PA administration, May begin Aspirin on 1-18-17 at 2100 if no conversion  Statins  Atorvastatin- 40 mg oral daily,   Aggressive risk factor modification: Hypertension and High Cholesterol,   Rehab Efforts: Physical Therapy, Occupational Therapy and Speech and Language Pathology,   Diagnostics: Ordered/Pending HgbA1C to assess blood glucose levels, MRI head without contrast to assess brain parenchyma, Trans-thoracic cardiac echo to assess cardiac function/status,   VTE Prophylaxis: None: Reason for No Pharmacological VTE Prophylaxis: Mechanical prophylaxis: Place SCDs, May begin Heparin 1-18-17 at 2200 if no conversion  BP Parameters: SBP <180  Nursing Orders: Neuro checks- Q1H, Swallowing evaluation by Nursing, Seizure precautions, Out of bed BID, Avoid Goel catheter, Pneumatic compression device, Stroke Education, Blood glucose target 100-130, Up with assistance

## 2017-01-18 NOTE — CONSULTS
PM&R consult received.  Reviewed patient history and current admission.  Full consult to follow.    LUIS ENRIQUE Laura, FNP-C  Physical Medicine & Rehabilitation   01/18/2017  Spectralink: [id number]

## 2017-01-19 LAB
ALBUMIN SERPL BCP-MCNC: 3 G/DL
ALP SERPL-CCNC: 51 U/L
ALT SERPL W/O P-5'-P-CCNC: 35 U/L
ANION GAP SERPL CALC-SCNC: 7 MMOL/L
AST SERPL-CCNC: 13 U/L
BASOPHILS # BLD AUTO: 0.02 K/UL
BASOPHILS NFR BLD: 0.5 %
BILIRUB SERPL-MCNC: 0.2 MG/DL
BUN SERPL-MCNC: 10 MG/DL
CALCIUM SERPL-MCNC: 8.4 MG/DL
CHLORIDE SERPL-SCNC: 110 MMOL/L
CO2 SERPL-SCNC: 25 MMOL/L
CREAT SERPL-MCNC: 0.8 MG/DL
DIASTOLIC DYSFUNCTION: NO
DIFFERENTIAL METHOD: ABNORMAL
EOSINOPHIL # BLD AUTO: 0.1 K/UL
EOSINOPHIL NFR BLD: 2.7 %
ERYTHROCYTE [DISTWIDTH] IN BLOOD BY AUTOMATED COUNT: 12.9 %
EST. GFR  (AFRICAN AMERICAN): >60 ML/MIN/1.73 M^2
EST. GFR  (NON AFRICAN AMERICAN): >60 ML/MIN/1.73 M^2
GLUCOSE SERPL-MCNC: 98 MG/DL
HCT VFR BLD AUTO: 36.4 %
HGB BLD-MCNC: 12.3 G/DL
LYMPHOCYTES # BLD AUTO: 1.5 K/UL
LYMPHOCYTES NFR BLD: 36.2 %
MAGNESIUM SERPL-MCNC: 2 MG/DL
MCH RBC QN AUTO: 30.4 PG
MCHC RBC AUTO-ENTMCNC: 33.8 %
MCV RBC AUTO: 90 FL
MONOCYTES # BLD AUTO: 0.2 K/UL
MONOCYTES NFR BLD: 5.6 %
NEUTROPHILS # BLD AUTO: 2.3 K/UL
NEUTROPHILS NFR BLD: 55 %
PHOSPHATE SERPL-MCNC: 3.6 MG/DL
PLATELET # BLD AUTO: 135 K/UL
PMV BLD AUTO: 10.8 FL
POCT GLUCOSE: 164 MG/DL (ref 70–110)
POCT GLUCOSE: 211 MG/DL (ref 70–110)
POCT GLUCOSE: 60 MG/DL (ref 70–110)
POCT GLUCOSE: 81 MG/DL (ref 70–110)
POCT GLUCOSE: 89 MG/DL (ref 70–110)
POCT GLUCOSE: 94 MG/DL (ref 70–110)
POTASSIUM SERPL-SCNC: 4 MMOL/L
PROT SERPL-MCNC: 5.7 G/DL
RBC # BLD AUTO: 4.05 M/UL
RETIRED EF AND QEF - SEE NOTES: 65 (ref 55–65)
SODIUM SERPL-SCNC: 142 MMOL/L
WBC # BLD AUTO: 4.12 K/UL

## 2017-01-19 PROCEDURE — 99232 SBSQ HOSP IP/OBS MODERATE 35: CPT | Mod: ,,, | Performed by: NURSE PRACTITIONER

## 2017-01-19 PROCEDURE — 63600175 PHARM REV CODE 636 W HCPCS: Performed by: PSYCHIATRY & NEUROLOGY

## 2017-01-19 PROCEDURE — 83735 ASSAY OF MAGNESIUM: CPT

## 2017-01-19 PROCEDURE — 92507 TX SP LANG VOICE COMM INDIV: CPT

## 2017-01-19 PROCEDURE — 20000000 HC ICU ROOM

## 2017-01-19 PROCEDURE — 80053 COMPREHEN METABOLIC PANEL: CPT

## 2017-01-19 PROCEDURE — 84100 ASSAY OF PHOSPHORUS: CPT

## 2017-01-19 PROCEDURE — 93306 TTE W/DOPPLER COMPLETE: CPT | Mod: 26,,, | Performed by: INTERNAL MEDICINE

## 2017-01-19 PROCEDURE — 97110 THERAPEUTIC EXERCISES: CPT

## 2017-01-19 PROCEDURE — 25000003 PHARM REV CODE 250: Performed by: STUDENT IN AN ORGANIZED HEALTH CARE EDUCATION/TRAINING PROGRAM

## 2017-01-19 PROCEDURE — 97530 THERAPEUTIC ACTIVITIES: CPT

## 2017-01-19 PROCEDURE — 25000003 PHARM REV CODE 250: Performed by: NURSE PRACTITIONER

## 2017-01-19 PROCEDURE — 97802 MEDICAL NUTRITION INDIV IN: CPT

## 2017-01-19 PROCEDURE — 99233 SBSQ HOSP IP/OBS HIGH 50: CPT | Mod: ,,, | Performed by: PSYCHIATRY & NEUROLOGY

## 2017-01-19 PROCEDURE — 97535 SELF CARE MNGMENT TRAINING: CPT

## 2017-01-19 PROCEDURE — 96374 THER/PROPH/DIAG INJ IV PUSH: CPT

## 2017-01-19 PROCEDURE — 85025 COMPLETE CBC W/AUTO DIFF WBC: CPT

## 2017-01-19 PROCEDURE — 25000003 PHARM REV CODE 250: Performed by: PSYCHIATRY & NEUROLOGY

## 2017-01-19 RX ORDER — CLONAZEPAM 1 MG/1
1 TABLET ORAL 2 TIMES DAILY PRN
Status: DISCONTINUED | OUTPATIENT
Start: 2017-01-19 | End: 2017-01-20 | Stop reason: HOSPADM

## 2017-01-19 RX ADMIN — FAMOTIDINE 20 MG: 20 TABLET, FILM COATED ORAL at 09:01

## 2017-01-19 RX ADMIN — CLONAZEPAM 1 MG: 1 TABLET ORAL at 10:01

## 2017-01-19 RX ADMIN — HEPARIN SODIUM 5000 UNITS: 5000 INJECTION, SOLUTION INTRAVENOUS; SUBCUTANEOUS at 05:01

## 2017-01-19 RX ADMIN — ASPIRIN 325 MG ORAL TABLET 325 MG: 325 PILL ORAL at 09:01

## 2017-01-19 RX ADMIN — ATORVASTATIN CALCIUM 40 MG: 20 TABLET, FILM COATED ORAL at 09:01

## 2017-01-19 RX ADMIN — SODIUM CHLORIDE: 0.9 INJECTION, SOLUTION INTRAVENOUS at 11:01

## 2017-01-19 RX ADMIN — CLONAZEPAM 1 MG: 1 TABLET ORAL at 09:01

## 2017-01-19 RX ADMIN — HEPARIN SODIUM 5000 UNITS: 5000 INJECTION, SOLUTION INTRAVENOUS; SUBCUTANEOUS at 01:01

## 2017-01-19 RX ADMIN — Medication 3 ML: at 05:01

## 2017-01-19 RX ADMIN — Medication 3 ML: at 09:01

## 2017-01-19 RX ADMIN — Medication 3 ML: at 02:01

## 2017-01-19 RX ADMIN — HEPARIN SODIUM 5000 UNITS: 5000 INJECTION, SOLUTION INTRAVENOUS; SUBCUTANEOUS at 09:01

## 2017-01-19 RX ADMIN — INSULIN ASPART 2 UNITS: 100 INJECTION, SOLUTION INTRAVENOUS; SUBCUTANEOUS at 09:01

## 2017-01-19 NOTE — PLAN OF CARE
SW met with Pt at bedside. MD had just been in the room and updated SW that Pt was upset about not being able to go live with her daughter again as her daughter was afraid of something happening when she was at work with no one being home. SW reported that she would be able to have HH to check on her if that would make her daughter feel better and gave Pt a list. Pt will let SW know where she will discharge and what her HH choice is later.    Update: CHAPIS advised by CM that the Pt will go to her ex-husbands home for a few weeks to receive HH and supervision. Once the daughter's  comes home from the off shore rig, Pt daughter will have Pt move back in.    Rose Marie Gutierrez LMSW  PRN   [facility]

## 2017-01-19 NOTE — PLAN OF CARE
Problem: Patient Care Overview  Goal: Plan of Care Review  Outcome: Ongoing (interventions implemented as appropriate)  POC reviewed w/ pt and  throughout the night; verbalized understanding. Questions and concerns addressed. Pt VSS ex pt bradycardic w/ HR between 49-58 BPM. No acute events overnight. Pt progressing towards goals. Will continue to monitor. See flowsheet for further assessment and VS info.

## 2017-01-19 NOTE — PLAN OF CARE
Problem: Occupational Therapy Goal  Goal: Occupational Therapy Goal  Goals to be met by: 1/25     Patient will increase functional independence with ADLs by performing:    UE Dressing with Supervision.  LE Dressing with Supervision.  Grooming while standing with Supervision.  Toileting from toilet with Supervision for hygiene and clothing management.   Rolling to Bilateral with Modified [place].   Supine to sit with Modified [place].  Stand pivot transfers with Supervision.   Outcome: Ongoing (interventions implemented as appropriate)  Goals remain appropriate

## 2017-01-19 NOTE — PLAN OF CARE
Problem: Patient Care Overview  Goal: Plan of Care Review  Outcome: Ongoing (interventions implemented as appropriate)  POC reviewed with pt and family at 1400. Pt verbalized understanding. Questions and concerns addressed. Neuro remains intact AAOX4,  Pt remains with Left facial and left side numbness and weakness Pt plan to stepdown from ICU, No acute events today. Pt progressing toward goals. Will continue to monitor. See flowsheets for full assessment and VS info.

## 2017-01-19 NOTE — CONSULTS
Consult Note  Physical Medicine & Rehab    Consult Requested By:  Kevyn Hamilton MD      Admit Date:  1/17/2017  Admitting Diagnosis:  HTN (hypertension), benign [I10], Left hemiparesis [G81.94], Acute right MCA stroke [I63.511]    Reason for Consult/Chief Complaint:  Rehab Evaluation    SUBJECTIVE:   History of Present Illness:  Shun Tidwell is a 51-year-old female with PMHx of HTN, HLD, CAD, and tobacco use.  Patient presented to [facility] with left sided weakness and speech difficulty.  Telemedicine consult completed.  CTH without acute pathology, and patient deemed tPA candidate.  IV tPA administered, and she was transferred to [facility] on 1/17/17 for further evaluation and management.  Upon admission, CTA without large vessel occlusion.  MRI negative for stroke.  DDx include R MCA ischemic stroke vs everted stroke vs stroke mimic s/p tPA.     Current Functional Status:  Evaluated by therapy.  Bed mobility SBA.  Sit to stand SBA.  Ambulated 100 ft CGA.  UBD CGA and LBD SBA.       Functional History: Patient lives in [place] with her daughter and grandchildren in a single story home with one step to enter.  Prior to admission, she was independent with ADLs and mobility.  She is right handed.  DME: none.    Review of Systems  Constitutional: No fever or chills.  No malaise or fatigue.  Skin: No rashes or lesions.   Eyes: No visual changes.  ENT: No nasal congestion, sore throat, or ear pain.  No difficulty swallowing.   Respiratory: No shortness of breath or wheezing.  No cough.  Cardiovascular: No chest pain or palpitations.  No edema.   Gl: No nausea or vomiting.  No abdominal pain.  No incontinence of bowel.  No constipation.   : No incontinence of bladder.   Musculoskeletal: No arthralgias.  No bone pain.   Neurological: No seizures or tremors.  Positive weakness.  Positive sensory impairment. No headache.  No balance difficulty or gait problems.   Behavioral/Psych: No changes in mood or  hallucinations.    Past Medical History   Diagnosis Date    Anxiety     CAD (coronary artery disease), native coronary artery     Current smoker     Depression     HTN (hypertension), benign     Hyperlipidemia      Past Surgical History   Procedure Laterality Date    Femoral stents      Abdominal aortic stent      Cholecystectomy      Hysterectomy      Bladder suspension      Hernia repair       No family history on file.  Social History   Substance Use Topics    Smoking status: Current Every Day Smoker     Packs/day: 2.00     Types: Cigarettes    Smokeless tobacco: Never Used    Alcohol use None     Review of patient's allergies indicates:   Allergen Reactions    Bentyl [dicyclomine]     Clindamycin     Macrobid [nitrofurantoin monohyd/m-cryst]     Percocet [oxycodone-acetaminophen]     Thorazine [chlorpromazine]     Topamax [topiramate]     Tramadol         Scheduled Medications:   aspirin  325 mg Oral Daily    atorvastatin  40 mg Oral Daily    famotidine  20 mg Oral BID    heparin (porcine)  5,000 Units Subcutaneous Q8H    senna-docusate 8.6-50 mg  1 tablet Oral BID    sodium chloride 0.9%  3 mL Intravenous Q8H     PRN Medications:  acetaminophen, clonazePAM, dextrose 50%, glucagon (human recombinant), insulin aspart, labetalol, ondansetron    OBJECTIVE:     Vital Signs (Most Recent)  Temp: 97.7 °F (36.5 °C) (01/19/17 1105)  Pulse: 62 (01/19/17 1305)  Resp: 16 (01/19/17 1305)  BP: 128/87 (01/19/17 1305)  SpO2: 100 % (01/19/17 1305)    Vital Signs Range (Last 24H):  Temp:  [97.7 °F (36.5 °C)-98.3 °F (36.8 °C)]   Pulse:  [49-85]   Resp:  [13-42]   BP: (104-166)/(62-96)   SpO2:  [95 %-100 %]     Physical Exam:  Vital signs reviewed.   General appearance:  No apparent distress.  Appears well-developed and well-nourished.   Skin:  Color and texture normal.  Warm and dry.  No jaundice.  No visible rashes or visible lesions.  HEENT:  Normocephalic and atraumatic.  No scleral icterus.  No  nasal discharge.    Pulmonary:  Normal respiratory effort.    Cardiac:  Normal heart rate.  Extremities: No calf tenderness.  Distal pulses intact.  No edema.    Abdomen:  Soft, non-tender and non-distended.  Musculoskeletal:  Moves all extremities spontaneously.  ROM: normal.    Neurologic:  -  Mental Status:  AAOx3.  Follows commands.  Answers correct age and .    -  Speech and language:  No aphasia, + dysarthria.    -  Coordination:  Finger to nose exam:  RUE normal, LUE normal.  -  Motor:  No pronator drift. RUE: 5/5.  LUE: 5-/5.  RLE: 5/5.  LLE: 5-/5.  -  Tone:  Normal.   -  Sensory:  Decreased to light touch and pin prick on left.   Behavioral/Psych: Calm and cooperative.    Diagnostic Results:  CT: Reviewed  MRI: Reviewed  Labs: Reviewed    ASSESSMENT/PLAN:      Shun Tidwell is a 51 y.o. female admitted on 2017 from  [facility] s/p tPA for left sided weakness and speech difficulty.  CTA without large vessel occlusion.  MRI negative for stroke.  DDx include R MCA ischemic stroke vs everted stroke vs stroke mimic s/p tPA.     -  Medical status:  Stroke:  ICU monitoring.  Stepping down to floor today.    -  Functional status:  Evaluated by therapy.  Bed mobility SBA.  Sit to stand SBA.  Ambulated 100 ft CGA.  UBD CGA and LBD SBA.     -  Cognitive/Speech/Language status:  Speech, language, and cognitive skills WFL/at baseline.  -  Nutrition/Swallow Status:  Passed bedside swallow evaluation.  Speech recommended regular diet and thin liquids.   -  DVT prophylaxis:  Scheduled: heparin Q8H.  -  Rehab goals:  Patient has good goals related to weakness, impaired endurance, impaired balance, impaired strength.  -  Reviewed discharge options with patient.  We discussed the differences between inpatient rehab, SNF, home health therapy, and outpatient therapy.  I encourage her to remain motivated and to work hard with therapy.    Patient progressing well with therapy and has limited goals for inpatient rehab.   Agree with therapy recommendations for home health therapy; however, typically not covered by Medicaid.  Recommend outpatient therapy.  Will sign off.  Please call with questions/concerns or re-consult if situation changes.    Thank you for consult.    LUIS ENRIQUE Laura, FNP-C    Physical Medicine & Rehabilitation   01/19/2017  Spectralink: [id number]    Collaborating Physician : Catracho Horta MD

## 2017-01-19 NOTE — PLAN OF CARE
Problem: Patient Care Overview  Goal: Plan of Care Review  Outcome: Ongoing (interventions implemented as appropriate)  Nutrition assessment completed. Please see RD note for details.

## 2017-01-19 NOTE — PLAN OF CARE
Problem: Physical Therapy Goal  Goal: Physical Therapy Goal  Goals to be met by: 1/25/17     Patient will increase functional independence with mobility by performing:    Supine to sit with Modified [place]. NOT MET  Sit to supine with Modified [place]. NOT MET  Sit to stand transfer with Supervision using No Assistive Device and Rolling Walker. NOT MET  Bed to chair transfer via Stand Pivot with Supervision using No Assistive Device. NOT MET  Gait x 150 feet with Stand-by Assistance using No Assistive Device and Rolling Walker. NOT MET  Stand for 15 minutes with Supervision using No Assistive Device with moderate balance challenges. NOT MET  Able to tolerate exercise for 15-20 reps with independence. NOT MET  Patient and family able to teachback stroke education independently. NOT MET   Outcome: Ongoing (interventions implemented as appropriate)  Pt progressing towards goals.     Tyler Friedman, VI  1/19/2017

## 2017-01-19 NOTE — PT/OT/SLP PROGRESS
"Speech Language Pathology Note/Discharge Note  Treatment/  Shun Tidwell   MRN: [id number]   Admitting Diagnosis: Acute right MCA stroke    Diet recommendations: Solid Diet Level: Regular  Liquid Diet Level: Thin     SLP Treatment Date: 17  Speech Start Time: 1056     Speech Stop Time: 1104     Speech Total (min): 8 min       TREATMENT BILLABLE MINUTES:  Speech Therapy Individual 8    Has the patient been evaluated by SLP for swallowing? : Yes  Keep patient NPO?: No   General Precautions: Standard, fall          Subjective:  " Are you going to throw this away?" referring to her handwriting sample    Pain Ratin/10              Pain Rating Post-Intervention: 0/10    Objective:      Pt seen sitting up in a chair with her  present. Pt and spouse reported pt's speech to be at baseline. They denied any changes in her cognition and felt she was at baseline. Reading and writing were functional with no visual spatial deficits noted. No further speech tx recommended. White board updated.     FIM:  Social Interaction: 7 Complete [place]--The patient interacts appropriately with staff, other patients, and family members (e.g., controls temper, accepts criticism, is aware that words and actions have an impact on others), and does not require medication for         Comprehension: 7 Complete [place]--The patient understand complex or abstract directions and conversation, and understand either spoken or written language (not necessarily English).                Assessment:  Shun Tidwell is a 51 y.o. female with a medical diagnosis of Acute right MCA stroke and presents with no deficits in speech,lang or cognition. Pt is at baseline. NO further speech tx recommended.     Discharge recommendations: Discharge Facility/Level Of Care Needs: home     Goals:   SLP Goals        Problem: SLP Goal    Goal Priority Disciplines Outcome   SLP Goal     SLP Ongoing (interventions implemented as appropriate)   Description: "  Goals due on 1/25  1.  Assess functional reading, writing visual spatial skills ( Pt. 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  Further assess cognition to determine need for therapy and contact family to determine baseline.  met                    Plan:   Patient to be seen Therapy Frequency: 5 x/week   Plan of Care expires: 02/16/17  Plan of Care reviewed with: patient, spouse  SLP Follow-up?: No  SLP - Next Visit Date: 01/19/17           SVETA Mays, CCC-SLP  01/19/2017

## 2017-01-19 NOTE — PROGRESS NOTES
[facility]  Vascular Neurology  Comprehensive Stroke Center  Progress Note      Neurologic Chief Complaint: status post tpa     Subjective:     Interval History: Patient is seen for follow-up neurological assessment and treatment recommendations: still complains of symptoms although improved. Headache persistent rated 5-6/10    HPI, Past Medical, Family, and Social History remains the same as documented in the initial encounter.     Review of Systems   Constitutional: Negative for chills and fever.   Eyes: Negative for photophobia.   Neurological: Positive for numbness and headaches.   Psychiatric/Behavioral: Negative for agitation.     Scheduled Meds:   atorvastatin  40 mg Oral Daily    famotidine  20 mg Oral BID    senna-docusate 8.6-50 mg  1 tablet Oral BID    sodium chloride 0.9%  3 mL Intravenous Q8H     Continuous Infusions:   sodium chloride 0.9% 75 mL/hr at 01/18/17 1724    nicardipine Stopped (01/18/17 0045)     PRN Meds:acetaminophen, calcium gluconate 1G in 100mL D5W, calcium gluconate 1G in 100mL D5W, calcium gluconate 1G in 100mL D5W, dextrose 50%, glucagon (human recombinant), insulin aspart, labetalol, magnesium sulfate IVPB, magnesium sulfate IVPB, ondansetron, potassium chloride **AND** potassium chloride **AND** potassium chloride, sodium phosphate IVPB, sodium phosphate IVPB, sodium phosphate IVPB    Objective:     Vital Signs (Most Recent):  Temp: 98.3 °F (36.8 °C) (01/18/17 1916)  Pulse: 64 (01/18/17 1832)  Resp: 16 (01/18/17 1327)  BP: 121/75 (01/18/17 1832)  SpO2: 97 % (01/18/17 1832)  BP Location: Right arm    Vital Signs Range (Last 24H):  Temp:  [98.3 °F (36.8 °C)-98.9 °F (37.2 °C)]   Pulse:  [54-79]   Resp:  [16-18]   BP: (104-155)/(59-86)   SpO2:  [95 %-100 %]   BP Location: Right arm    Physical Exam   Constitutional: She is oriented to person, place, and time. She appears well-developed and well-nourished.   HENT:   Head: Normocephalic.   Eyes: EOM are normal.  Pupils are equal, round, and reactive to light.   Cardiovascular: Normal rate.    Pulmonary/Chest: Effort normal.   Neurological: She is alert and oriented to person, place, and time.   Skin: Skin is warm and dry.   Nursing note and vitals reviewed.      Neurological Exam:   LOC: alert and follows requests  Language: No aphasia  Speech: Dysarthria  Orientation: Person, Place, Time  Memory: Recent memory intact, Remote memory intact, Age correct, Month correct  Visual Fields (CN II): Full  EOM (CN III, IV, VI): Full/intact  Oculocephalics: normal  Pupils (CN III, IV, VI): PERRL  Facial Sensation (CN V): reports abrupt change in sensation when crossing distinct midline. numbness left per patient  Facial Movement (CN VII): symmetric facial expression  Hearing (CN VIII): intact bilaterally  Gag Reflex (CN IX, X): normal/symmetric  Shoulder/Neck (CN XI): SCM-Left: Normal ; SCM-Right: Normal ; Shoulder Shrug: Normal/Symetric  Motor*: Arm Left:  Normal (5/5), Leg Left:   Normal (5/5), Arm Right:   Normal (5/5), Leg Right:   Normal (5/5)  Cerebellar*: Normal limb  Sensation: meredith-hypoesthesia left  Tone: Arm-Left: normal; Leg-Left: normal; Arm-Right: normal; Leg-Right: normal    NIH Stroke Scale:    Level of Consciousness: 0 - alert  LOC Questions: 0 - answers both correctly  LOC Commands: 0 - performs both correctly  Best Gaze: 0 - normal  Visual: 0 - no visual loss  Facial Palsy: 0 - normal  Motor Left Arm: 0 - no drift  Motor Right Arm: 0 - no drift  Motor Left Le - no drift  Motor Right Le - no drift  Limb Ataxia: 0 - absent  Sensory: 1 - mild to moderate loss  Best Language: 0 - no aphasia  Dysarthria: 1 - mild to moderate dysarthria  Extinction and Inattention: 0 - no neglect  NIH Stroke Scale Total: 2      Laboratory:  CMP:   Recent Labs  Lab 17  0710 17  1718   CALCIUM 6.3* 8.6*   ALBUMIN 2.4*  --    PROT 4.2*  --     142   K 3.1* 4.0   CO2 21* 28   * 109   BUN 8 9   CREATININE 0.6  0.8   ALKPHOS 37*  --    ALT 34  --    AST 16  --    BILITOT 0.4  --      CBC:   Recent Labs  Lab 01/18/17  0710   WBC 4.12   RBC 3.25*   HGB 9.8*   HCT 29.1*   *   MCV 90   MCH 30.2   MCHC 33.7     Lipid Panel:   Recent Labs  Lab 01/17/17 2244   CHOL 132   LDLCALC 74.2   HDL 27*   TRIG 154*     Coagulation:   Recent Labs  Lab 01/17/17 2244   INR 1.1   APTT 22.3     Platelet Aggregation Study: No results for input(s): PLTAGG, PLTAGINTERP, PLTAGREGLACO, ADPPLTAGGREG in the last 168 hours.  Hgb A1C:   Recent Labs  Lab 01/17/17 2244   HGBA1C 5.6     TSH:   Recent Labs  Lab 01/17/17 2244   TSH 0.180*       Diagnostic Results:  I have personally reviewed:  CTA stroke multiphase 1/17/17  No CT evidence of acute infarction or intracranial hemorrhage in this patient is status post tPA.    No evidence of vascular high-grade stenosis, aneurysm, dissection or AVM.    Mild calcific atherosclerosis at the origin of the left common carotid artery resulting in 20% stenosis by NASCET criteria.    Stable hypodense lesion identified in the right basal ganglia, may represent remote lacunar type infarct versus prominent perivascular space.    Assessment/Plan:     50 y/o female with left side numbness, blurry vision, headache and weakness s/p TPA on 1-17-17 at 1925 from [facility]. Possible Small vessel disease as no LVO seen on CTA multiphasic.      * Acute right MCA stroke  50 y/o female with left side numbness and weakness- treated with tPA. MRI pending     Antithrombotics for secondary stroke prevention: None: Reason:  Hold all Antithrombotics x 24 hours after IV t-PA administration, May begin Aspirin on 1-18-17 at 2100 if no conversion  Statins  Atorvastatin- 40 mg oral daily,   Aggressive risk factor modification: Hypertension and High Cholesterol,   Rehab Efforts: Physical Therapy, Occupational Therapy and Speech and Language Pathology,   Diagnostics: Ordered/Pending HgbA1C to assess blood glucose levels, MRI head without  contrast to assess brain parenchyma, Trans-thoracic cardiac echo to assess cardiac function/status,   VTE Prophylaxis: None: Reason for No Pharmacological VTE Prophylaxis: Mechanical prophylaxis: Place SCDs, May begin Heparin 1-18-17 at 2200 if no conversion  BP Parameters: SBP <180  Nursing Orders: Neuro checks- Q1H, Swallowing evaluation by Nursing, Seizure precautions, Out of bed BID, Avoid Goel catheter, Pneumatic compression device, Stroke Education, Blood glucose target 100-130, Up with assistance    Left hemiparesis  Aggressive therapy  Improving but patient reports not yet back to baseline - recommending home health     Hyperlipidemia  Lipitor 40 mg daily  LDL 74    HTN (hypertension), benign  S/p tpa so SBP <180  Risk factor for stroke, should be managed with close outpatient follow up as well     Tissue plasminogen activator (t-PA) administered at other facility within 24 hours prior to current admission  Close monitoring in [facility]      OMAR Montes  Comprehensive Stroke Center  Department of Vascular Neurology   [facility]

## 2017-01-19 NOTE — PT/OT/SLP PROGRESS
Occupational Therapy  Treatment    Shun Tidwell   MRN: [id number]   Admitting Diagnosis: Acute right MCA stroke    OT Date of Treatment: 17   OT Start Time: 1020  OT Stop Time: 1056  OT Total Time (min): 36 min    Billable Minutes:  Self Care/Home Management 20 and Therapeutic Exercise 16    General Precautions: Standard, aspiration, fall (cardiac)    Do you have any cultural, spiritual, Catholic conflicts, given your current situation?: Shinto    Subjective:  Communicated with RN prior to session.  Pt & pt's family member reported having anxiety issues and needing her medication for it.    Pain Ratin/10  Pain Rating Post-Intervention: 0/10    Objective:  Patient found with: blood pressure cuff, telemetry, pulse ox (continuous), peripheral IV     Functional Mobility:    Transfers:   Sit <> Stand Assistance: Stand By Assistance (from chair x 2-3 trials)  Sit <> Stand Assistive Device: No Assistive Device    Functional Ambulation: Pt performed functional mobility to bathroom & back with SBA.    Activities of Daily Living:     UE Dressing Level of Assistance: Contact guard (donnign gown around back while standing)  LE Dressing Level of Assistance: Stand by assistance (donning socks seated in chair)  Grooming Position: Standing at sink  Grooming Level of Assistance: Stand by assistance  Toileting Where Assessed: Toilet  Toileting Level of Assistance: Stand by assistance     Therapeutic Activities and Exercises:  Pt performed AROM exercises with BUE in all planes x 10 reps each while seated in chair.  Pt noted with assessment to have continued deficits in sensation for light touch, proprioception & temperature descrimiation.  Provided education on safety in the home due to sensory deficits with pt & family member verbalizing understanding and agreement.  Pt had no further questions & when asked whether there were any concerns pt reported none.      AM-PAC 6 CLICK ADL   How much help from another person does  this patient currently need?   1 = Unable, Total/Dependent Assistance  2 = A lot, Maximum/Moderate Assistance  3 = A little, Minimum/Contact Guard/Supervision  4 = None, Modified [place]/Independent    Putting on and taking off regular lower body clothing? : 3  Bathing (including washing, rinsing, drying)?: 3  Toileting, which includes using toilet, bedpan, or urinal? : 3  Putting on and taking off regular upper body clothing?: 3  Taking care of personal grooming such as brushing teeth?: 3  Eating meals?: 3  Total Score: 18     AM-PAC Raw Score CMS G-Code Modifier Level of Impairment Assistance   6 % Total / Unable   7 - 9 CM 80 - 100% Maximal Assist   10 - 14 CL 60 - 80% Moderate Assist   15 - 19 CK 40 - 60% Moderate Assist   20 - 22 CJ 20 - 40% Minimal Assist   23 CI 1-20% SBA / CGA   24 CH 0% Independent/ Mod I     Patient left up in chair with all lines intact, call button in reach, RN notified, family member present and white board updated.    ASSESSMENT:  Shun Tidwell is a 51 y.o. female with a medical diagnosis of Acute right MCA stroke and presents with good participation and motivation.  Pt with improved overall mobility & safety on this date.  Pt continues to have noted sensory deficits.    Rehab identified problem list/impairments: Rehab identified problem list/impairments: weakness, impaired endurance, impaired sensation, impaired self care skills, impaired functional mobilty, impaired balance, decreased upper extremity function    Rehab potential is good.    Activity tolerance: Good    Discharge recommendations: Discharge Facility/Level Of Care Needs: home health OT     GOALS:   Occupational Therapy Goals        Problem: Occupational Therapy Goal    Goal Priority Disciplines Outcome Interventions   Occupational Therapy Goal     OT, PT/OT Ongoing (interventions implemented as appropriate)    Description:  Goals to be met by: 1/25     Patient will increase functional independence with ADLs  by performing:    UE Dressing with Supervision.  LE Dressing with Supervision.  Grooming while standing with Supervision.  Toileting from toilet with Supervision for hygiene and clothing management.   Rolling to Bilateral with Modified [place].   Supine to sit with Modified [place].  Stand pivot transfers with Supervision.                Plan:  Patient to be seen 6 x/week to address the above listed problems via self-care/home management, neuromuscular re-education, cognitive retraining, sensory integration, therapeutic activities, therapeutic exercises  Plan of Care reviewed with: patient, family         BREE Ward  01/19/2017

## 2017-01-19 NOTE — ASSESSMENT & PLAN NOTE
- 51/F with L-sided numbness and weakness s/p tPA 01/17. MRI demonstrated no acute abnormalities.  - Antithrombotics for secondary stroke prevention: Aspirin 325mg PO daily  - Statins  Atorvastatin- 40 mg oral daily  - Aggressive risk factor modification: Hypertension and High Cholesterol  - Rehab Efforts: Physical Therapy, Occupational Therapy and Speech and Language Pathology  - Diagnostics: Ordered/Pending HgbA1C to assess blood glucose levels, MRI head without contrast to assess brain parenchyma, Trans-thoracic cardiac echo to assess cardiac function/status  - VTE Prophylaxis: None: Reason for No Pharmacological VTE Prophylaxis: Mechanical prophylaxis: Place SCDs, heparin 5000U subQ q8hr  - BP Parameters: SBP <180  - Nursing Orders: Neuro checks- Q1H, Swallowing evaluation by Nursing, Seizure precautions, Out of bed BID, Avoid Goel catheter, Pneumatic compression device, Stroke Education, Blood glucose target 100-130, Up with assistance

## 2017-01-19 NOTE — PLAN OF CARE
01/19/17 1618   Discharge Assessment   Assessment Type Discharge Planning Assessment   Confirmed/corrected address and phone number on facesheet? Yes   Assessment information obtained from? Patient   Expected Length of Stay (days) 3   Communicated expected length of stay with patient/caregiver yes   Prior to hospitilization cognitive status: Alert/Oriented   Prior to hospitalization functional status: Independent   Current cognitive status: Alert/Oriented   Current Functional Status: Independent   Arrived From acute care hospital   Lives With child(nick), adult  (daughter)   Able to Return to Prior Arrangements no  (Per patient, she will discharge to 's home)   Is patient able to care for self after discharge? Yes   How many people do you have in your home that can help with your care after discharge? 1   Who are your caregiver(s) and their phone number(s)? Dwight Diann (spouse) [phone]   Patient's perception of discharge disposition home or selfcare;home health   Readmission Within The Last 30 Days no previous admission in last 30 days   Patient currently being followed by outpatient case management? No   Patient currently receives home health services? No   Does the patient currently use HME? No   Patient currently receives private duty nursing? N/A   Patient currently receives any other outside agency services? No   Equipment Currently Used at Home none   Do you have any problems affording any of your prescribed medications? No   Is the patient taking medications as prescribed? yes   Do you have any financial concerns preventing you from receiving the healthcare you need? No   Does the patient have transportation to healthcare appointments? Yes   Transportation Available family or friend will provide   On Dialysis? No   Does the patient receive services at the Coumadin Clinic? No   Are there any open cases? No   Discharge Plan A Home;Home Health   Discharge Plan B Home;Home Health   Patient/Family In  Agreement With Plan yes       Patient stated that she cannot return to daughter's home 2/2 her daughter works and son in law if offshore.  She stated that she will discharge to her 's home at [address], [place] [address].      Therapy is recommending home health/     Discharge/ My Health Packet Folder Given to patient/family:      Yes      PCP:  Anuel Geronimo NP  [address] / [place] [id number]   [phone]   [facility] [place]      Pharmacy:    [facility] - [place], [place] - [address]  [address]  [address]  [place] [address]  Phone: [phone] Fax: [phone]        Emergency Contacts:  Extended Emergency Contact Information  Primary Emergency Contact: Dwight Tidwell   [facility]  Home Phone: [phone]  Relation: Spouse      Insurance:  Payor: MEDICAID / Plan: [facility] COMMUNITY PLAN [facility] ([place] MEDICAID) / Product Type: Managed Medicaid /       Rosina Zavala RN, CCRN-K, [facility]  Neuro-Critical Care   X [phone]

## 2017-01-19 NOTE — ASSESSMENT & PLAN NOTE
- Risk factor for stroke.  - Given s/p tPA, goal SBP < 180.  - Did not require nicardipine gtt after admission.  - Will require close outpatient follow-up after discharge.

## 2017-01-19 NOTE — ASSESSMENT & PLAN NOTE
Aggressive therapy  Improving but patient reports not yet back to baseline - recommending home health

## 2017-01-19 NOTE — SUBJECTIVE & OBJECTIVE
Neurologic Chief Complaint: status post tpa     Subjective:     Interval History: Patient is seen for follow-up neurological assessment and treatment recommendations: still complains of symptoms although improved. Headache persistent rated 5-6/10    HPI, Past Medical, Family, and Social History remains the same as documented in the initial encounter.     Review of Systems   Constitutional: Negative for chills and fever.   Eyes: Negative for photophobia.   Neurological: Positive for numbness and headaches.   Psychiatric/Behavioral: Negative for agitation.     Scheduled Meds:   atorvastatin  40 mg Oral Daily    famotidine  20 mg Oral BID    senna-docusate 8.6-50 mg  1 tablet Oral BID    sodium chloride 0.9%  3 mL Intravenous Q8H     Continuous Infusions:   sodium chloride 0.9% 75 mL/hr at 01/18/17 1724    nicardipine Stopped (01/18/17 0045)     PRN Meds:acetaminophen, calcium gluconate 1G in 100mL D5W, calcium gluconate 1G in 100mL D5W, calcium gluconate 1G in 100mL D5W, dextrose 50%, glucagon (human recombinant), insulin aspart, labetalol, magnesium sulfate IVPB, magnesium sulfate IVPB, ondansetron, potassium chloride **AND** potassium chloride **AND** potassium chloride, sodium phosphate IVPB, sodium phosphate IVPB, sodium phosphate IVPB    Objective:     Vital Signs (Most Recent):  Temp: 98.3 °F (36.8 °C) (01/18/17 1916)  Pulse: 64 (01/18/17 1832)  Resp: 16 (01/18/17 1327)  BP: 121/75 (01/18/17 1832)  SpO2: 97 % (01/18/17 1832)  BP Location: Right arm    Vital Signs Range (Last 24H):  Temp:  [98.3 °F (36.8 °C)-98.9 °F (37.2 °C)]   Pulse:  [54-79]   Resp:  [16-18]   BP: (104-155)/(59-86)   SpO2:  [95 %-100 %]   BP Location: Right arm    Physical Exam   Constitutional: She is oriented to person, place, and time. She appears well-developed and well-nourished.   HENT:   Head: Normocephalic.   Eyes: EOM are normal. Pupils are equal, round, and reactive to light.   Cardiovascular: Normal rate.    Pulmonary/Chest:  Effort normal.   Neurological: She is alert and oriented to person, place, and time.   Skin: Skin is warm and dry.   Nursing note and vitals reviewed.      Neurological Exam:   LOC: alert and follows requests  Language: No aphasia  Speech: Dysarthria  Orientation: Person, Place, Time  Memory: Recent memory intact, Remote memory intact, Age correct, Month correct  Visual Fields (CN II): Full  EOM (CN III, IV, VI): Full/intact  Oculocephalics: normal  Pupils (CN III, IV, VI): PERRL  Facial Sensation (CN V): reports abrupt change in sensation when crossing distinct midline. numbness left per patient  Facial Movement (CN VII): symmetric facial expression  Hearing (CN VIII): intact bilaterally  Gag Reflex (CN IX, X): normal/symmetric  Shoulder/Neck (CN XI): SCM-Left: Normal ; SCM-Right: Normal ; Shoulder Shrug: Normal/Symetric  Motor*: Arm Left:  Normal (5/5), Leg Left:   Normal (5/5), Arm Right:   Normal (5/5), Leg Right:   Normal (5/5)  Cerebellar*: Normal limb  Sensation: meredith-hypoesthesia left  Tone: Arm-Left: normal; Leg-Left: normal; Arm-Right: normal; Leg-Right: normal    NIH Stroke Scale:    Level of Consciousness: 0 - alert  LOC Questions: 0 - answers both correctly  LOC Commands: 0 - performs both correctly  Best Gaze: 0 - normal  Visual: 0 - no visual loss  Facial Palsy: 0 - normal  Motor Left Arm: 0 - no drift  Motor Right Arm: 0 - no drift  Motor Left Le - no drift  Motor Right Le - no drift  Limb Ataxia: 0 - absent  Sensory: 1 - mild to moderate loss  Best Language: 0 - no aphasia  Dysarthria: 1 - mild to moderate dysarthria  Extinction and Inattention: 0 - no neglect  NIH Stroke Scale Total: 2      Laboratory:  CMP:   Recent Labs  Lab 17  0710 17  1718   CALCIUM 6.3* 8.6*   ALBUMIN 2.4*  --    PROT 4.2*  --     142   K 3.1* 4.0   CO2 21* 28   * 109   BUN 8 9   CREATININE 0.6 0.8   ALKPHOS 37*  --    ALT 34  --    AST 16  --    BILITOT 0.4  --      CBC:   Recent Labs  Lab  01/18/17  0710   WBC 4.12   RBC 3.25*   HGB 9.8*   HCT 29.1*   *   MCV 90   MCH 30.2   MCHC 33.7     Lipid Panel:   Recent Labs  Lab 01/17/17 2244   CHOL 132   LDLCALC 74.2   HDL 27*   TRIG 154*     Coagulation:   Recent Labs  Lab 01/17/17 2244   INR 1.1   APTT 22.3     Platelet Aggregation Study: No results for input(s): PLTAGG, PLTAGINTERP, PLTAGREGLACO, ADPPLTAGGREG in the last 168 hours.  Hgb A1C:   Recent Labs  Lab 01/17/17 2244   HGBA1C 5.6     TSH:   Recent Labs  Lab 01/17/17 2244   TSH 0.180*       Diagnostic Results:  I have personally reviewed:  CTA stroke multiphase 1/17/17  No CT evidence of acute infarction or intracranial hemorrhage in this patient is status post tPA.    No evidence of vascular high-grade stenosis, aneurysm, dissection or AVM.    Mild calcific atherosclerosis at the origin of the left common carotid artery resulting in 20% stenosis by NASCET criteria.    Stable hypodense lesion identified in the right basal ganglia, may represent remote lacunar type infarct versus prominent perivascular space.

## 2017-01-19 NOTE — ASSESSMENT & PLAN NOTE
52 y/o female with left side numbness and weakness- treated with tPA. MRI pending     Antithrombotics for secondary stroke prevention: None: Reason:  Hold all Antithrombotics x 24 hours after IV t-PA administration, May begin Aspirin on 1-18-17 at 2100 if no conversion  Statins  Atorvastatin- 40 mg oral daily,   Aggressive risk factor modification: Hypertension and High Cholesterol,   Rehab Efforts: Physical Therapy, Occupational Therapy and Speech and Language Pathology,   Diagnostics: Ordered/Pending HgbA1C to assess blood glucose levels, MRI head without contrast to assess brain parenchyma, Trans-thoracic cardiac echo to assess cardiac function/status,   VTE Prophylaxis: None: Reason for No Pharmacological VTE Prophylaxis: Mechanical prophylaxis: Place SCDs, May begin Heparin 1-18-17 at 2200 if no conversion  BP Parameters: SBP <180  Nursing Orders: Neuro checks- Q1H, Swallowing evaluation by Nursing, Seizure precautions, Out of bed BID, Avoid Goel catheter, Pneumatic compression device, Stroke Education, Blood glucose target 100-130, Up with assistance

## 2017-01-19 NOTE — CONSULTS
[facility]  Adult Nutrition  Consult Note    SUMMARY     Recommendations    Recommendation/Intervention:   1. Continue Regular diet with texture changes per SLP recommendations.   2. Encourage and promote po intake for optimal nutrition.     RD to monitor.      Goals: Pt to tolerate >50% of meals.  Nutrition Goal Status: new  Communication of RD Recs: reviewed with RN    Continuum of Care Plan     Discharge planning: adequate po intake for optimal nutrition    Reason for Assessment    Reason for Assessment: physician consult  Diagnosis: stroke/CVA  Relevent Medical History: HTN, PVD, CAD         General Information Comments: No education needs identified at this time.     Nutrition Prescription Ordered    Current Diet Order: Regular      Nutrition Risk Screen     Nutrition Risk Screen: no indicators present    Nutrition/Diet History       Typical Food/Fluid Intake: Pt reports adequate po intake PTA. Wt loss reported 20lb over multiple years. 100% of bkfst consumed. Boost for bkfst ordered.           Factors Affecting Nutritional Intake: other (see comments) (None at this time)                Labs/Tests/Procedures/Meds       Pertinent Labs Reviewed: reviewed     Pertinent Medications Reviewed: reviewed  Pertinent Medications Comments: asa, statin, IVF, heparin    Physical Findings    Overall Physical Appearance: on oxygen therapy, other (see comments) (nourished)        Skin: intact    Anthropometrics       Height (inches): 62.01 in  Weight Method: Stated  Weight (kg): 54.3 kg     Ideal Body Weight (IBW), Female: 110.05 lb     % Ideal Body Weight, Female (lb): 108.78 lb  BMI (kg/m2): 21.89  BMI Grade: 18.5-24.9 - normal     Estimated/Assessed Needs    Weight Used For Calorie Calculations: 54.3 kg (119 lb 11.4 oz)   Height (cm): 157.5 cm     Energy Need Method: [place]-St Jeor (1392kcals (PAL 1.25))       RMR ([place]-St. Jeor Equation): 1114.91        Weight Used For Protein Calculations: 54.3 kg  (119 lb 11.4 oz)  Protein Requirements: 54-65g (1.0-1.2g/kg)    Fluid Need Method: other (see comments) (1ml/kcal or per MD)                           Malnutrition (Undernutrition) Diagnosis    % Intake of Estimated Energy Needs: 75 - 100%  % Meal Intake: 100%                         Nutrition Diagnosis    Nutrition Problem: Other (see comments) (No nutrition diagnosis at this time)             Monitor and Evaluation    Food and Nutrient Intake: energy intake, food and beverage intake  Food and Nutrient Adminstration: diet order        Anthropometric Measurements: weight, weight change, body mass index  Biochemical Data, Medical Tests and Procedures: other (specify) (all labs)  Nutrition-Focused Physical Findings: overall appearance    Nutrition Risk    Level of Risk: moderate    Nutrition Follow-Up    RD Follow-up?: Yes

## 2017-01-19 NOTE — ASSESSMENT & PLAN NOTE
S/p tpa so SBP <180  Risk factor for stroke, should be managed with close outpatient follow up as well

## 2017-01-19 NOTE — PLAN OF CARE
Problem: SLP Goal  Goal: SLP Goal  Goals due on 1/25  1. Assess functional reading, writing visual spatial skills ( Pt. Has 7th grade ed [employer]) met  2. Further assess cognition to determine need for therapy and contact family to determine baseline. met     Pt and spouse report pt is at baseline for speech,lang and cognition. No further speech tx recommended at this time.      SVETA Mays, CCC-SLP  1/19/2017

## 2017-01-19 NOTE — PROGRESS NOTES
[facility]  Vascular Neurology  Comprehensive Stroke Center  Progress Note      Neurologic Chief Complaint: L-sided weakness, s/p tPA 01/17/17    Subjective:     Interval History: Patient is seen for follow-up neurological assessment and treatment recommendations: No acute events overnight. Patient states symptoms continuing to improve. No other concerns at this time.    HPI, Past Medical, Family, and Social History remains the same as documented in the initial encounter.     Review of Systems   Constitutional: Negative for chills and fever.   Respiratory: Negative for cough and shortness of breath.    Cardiovascular: Negative for chest pain and palpitations.   Gastrointestinal: Negative for abdominal pain, nausea and vomiting.     Scheduled Meds:   aspirin  325 mg Oral Daily    atorvastatin  40 mg Oral Daily    famotidine  20 mg Oral BID    heparin (porcine)  5,000 Units Subcutaneous Q8H    senna-docusate 8.6-50 mg  1 tablet Oral BID    sodium chloride 0.9%  3 mL Intravenous Q8H     Continuous Infusions:   sodium chloride 0.9% 75 mL/hr at 01/19/17 1605     PRN Meds:acetaminophen, clonazePAM, dextrose 50%, glucagon (human recombinant), insulin aspart, labetalol, ondansetron    Objective:     Vital Signs (Most Recent):  Temp: 97.7 °F (36.5 °C) (01/19/17 1505)  Pulse: 65 (01/19/17 1605)  Resp: 17 (01/19/17 1605)  BP: 138/67 (01/19/17 1605)  SpO2: 100 % (01/19/17 1605)  BP Location: Right arm    Vital Signs Range (Last 24H):  Temp:  [97.7 °F (36.5 °C)-98.3 °F (36.8 °C)]   Pulse:  [49-85]   Resp:  [13-42]   BP: (110-166)/(62-96)   SpO2:  [95 %-100 %]   BP Location: Right arm    Physical Exam   Constitutional: She is oriented to person, place, and time. She appears well-developed. No distress.   thin   HENT:   Head: Normocephalic and atraumatic.   Nose: Nose normal.   Mouth/Throat: Oropharynx is clear and moist.   Eyes: Conjunctivae are normal. Pupils are equal, round, and reactive to light.    Cardiovascular: Normal rate, regular rhythm, normal heart sounds and intact distal pulses.    Pulmonary/Chest: Effort normal and breath sounds normal.   Abdominal: Soft. Bowel sounds are normal. She exhibits no distension. There is no tenderness.   Musculoskeletal: She exhibits no edema.   Neurological: She is alert and oriented to person, place, and time.   Skin: Skin is warm and dry. No rash noted.   Vitals reviewed.    Neurological Exam:   Mental status: Alert, oriented x 3, normal attention and concentration, normal thought process  Cranial nerves: CNII: PERRL, visual fields intact. CNIII, IV, VI: EOMI. CNV, VII: Facial muscles symmetrical with no noted weakness. Sensation decreased to light touch and temperature L side. CNVIII: hearing intact. CNIX, X, XII: tongue and palatal motion intact, normal phonation and cough. CNXI: strength 5/5 R, 4-/5 L.  Muscular: Normal bulk and tone, strength 4/5 LUE, LLE, 5/5 RUE, RLE.  Sensory: Decreased sensation on L side to light touch and temperature.  Cerebellar: Normal finger-nose-finger and heel-to-shin bilaterally.  Reflexes: Biceps, triceps, brachioradialis, patellar and ankle 2 bilaterally.    NIH Stroke Scale:    Level of Consciousness: 0 - alert  LOC Questions: 0 - answers both correctly  LOC Commands: 0 - performs both correctly  Best Gaze: 0 - normal  Visual: 0 - no visual loss  Facial Palsy: 0 - normal  Motor Left Arm: 0 - no drift  Motor Right Arm: 0 - no drift  Motor Left Le - no drift  Motor Right Le - no drift  Limb Ataxia: 0 - absent  Sensory: 1 - mild to moderate loss  Best Language: 0 - no aphasia  Dysarthria: 1 - mild to moderate dysarthria  Extinction and Inattention: 0 - no neglect  NIH Stroke Scale Total: 2      Laboratory:  Recent Results (from the past 24 hour(s))   Troponin I    Collection Time: 17  5:18 PM   Result Value Ref Range    Troponin I <0.006 0.000 - 0.026 ng/mL   Basic metabolic panel    Collection Time: 17  5:18 PM    Result Value Ref Range    Sodium 142 136 - 145 mmol/L    Potassium 4.0 3.5 - 5.1 mmol/L    Chloride 109 95 - 110 mmol/L    CO2 28 23 - 29 mmol/L    Glucose 111 (H) 70 - 110 mg/dL    BUN, Bld 9 6 - 20 mg/dL    Creatinine 0.8 0.5 - 1.4 mg/dL    Calcium 8.6 (L) 8.7 - 10.5 mg/dL    Anion Gap 5 (L) 8 - 16 mmol/L    eGFR if African American >60.0 >60 mL/min/1.73 m^2    eGFR if non African American >60.0 >60 mL/min/1.73 m^2   POCT glucose    Collection Time: 01/18/17 11:01 PM   Result Value Ref Range    POCT Glucose 60 (L) 70 - 110 mg/dL   POCT glucose    Collection Time: 01/18/17 11:11 PM   Result Value Ref Range    POCT Glucose 94 70 - 110 mg/dL   Comprehensive metabolic panel    Collection Time: 01/19/17  2:12 AM   Result Value Ref Range    Sodium 142 136 - 145 mmol/L    Potassium 4.0 3.5 - 5.1 mmol/L    Chloride 110 95 - 110 mmol/L    CO2 25 23 - 29 mmol/L    Glucose 98 70 - 110 mg/dL    BUN, Bld 10 6 - 20 mg/dL    Creatinine 0.8 0.5 - 1.4 mg/dL    Calcium 8.4 (L) 8.7 - 10.5 mg/dL    Total Protein 5.7 (L) 6.0 - 8.4 g/dL    Albumin 3.0 (L) 3.5 - 5.2 g/dL    Total Bilirubin 0.2 0.1 - 1.0 mg/dL    Alkaline Phosphatase 51 (L) 55 - 135 U/L    AST 13 10 - 40 U/L    ALT 35 10 - 44 U/L    Anion Gap 7 (L) 8 - 16 mmol/L    eGFR if African American >60.0 >60 mL/min/1.73 m^2    eGFR if non African American >60.0 >60 mL/min/1.73 m^2   CBC auto differential    Collection Time: 01/19/17  2:12 AM   Result Value Ref Range    WBC 4.12 3.90 - 12.70 K/uL    RBC 4.05 4.00 - 5.40 M/uL    Hemoglobin 12.3 12.0 - 16.0 g/dL    Hematocrit 36.4 (L) 37.0 - 48.5 %    MCV 90 82 - 98 fL    MCH 30.4 27.0 - 31.0 pg    MCHC 33.8 32.0 - 36.0 %    RDW 12.9 11.5 - 14.5 %    Platelets 135 (L) 150 - 350 K/uL    MPV 10.8 9.2 - 12.9 fL    Gran # 2.3 1.8 - 7.7 K/uL    Lymph # 1.5 1.0 - 4.8 K/uL    Mono # 0.2 (L) 0.3 - 1.0 K/uL    Eos # 0.1 0.0 - 0.5 K/uL    Baso # 0.02 0.00 - 0.20 K/uL    Gran% 55.0 38.0 - 73.0 %    Lymph% 36.2 18.0 - 48.0 %    Mono% 5.6 4.0  - 15.0 %    Eosinophil% 2.7 0.0 - 8.0 %    Basophil% 0.5 0.0 - 1.9 %    Differential Method Automated    Magnesium    Collection Time: 01/19/17  2:12 AM   Result Value Ref Range    Magnesium 2.0 1.6 - 2.6 mg/dL   Phosphorus    Collection Time: 01/19/17  2:12 AM   Result Value Ref Range    Phosphorus 3.6 2.7 - 4.5 mg/dL   POCT glucose    Collection Time: 01/19/17  9:41 AM   Result Value Ref Range    POCT Glucose 164 (H) 70 - 110 mg/dL   POCT glucose    Collection Time: 01/19/17  1:58 PM   Result Value Ref Range    POCT Glucose 81 70 - 110 mg/dL   2D echo with color flow doppler and bubble contrast    Collection Time: 01/19/17  2:00 PM   Result Value Ref Range    EF 65 55 - 65    Diastolic Dysfunction No      Diagnostic Results:  CTA Stroke Multi-phase 01/17/17:  No CT evidence of acute infarction or intracranial hemorrhage in this patient is status post tPA. No evidence of vascular high-grade stenosis, aneurysm, dissection or AVM. Mild calcific atherosclerosis at the origin of the left common carotid artery resulting in 20% stenosis by NASCET criteria. Stable hypodense lesion identified in the right basal ganglia, may represent remote lacunar type infarct versus prominent perivascular space.    MRI Brain WO Contrast 01/17/17:  No acute intracranial pathology, specifically without evidence for infarct or hemorrhage. Prominent perivascular space within the right lentiform nucleus. Mild age advanced cerebral volume loss and chronic white matter changes as above.    Assessment/Plan:     51/F PMH HTN, HLD, DMII and tobacco abuse presented to OSH with L-sided sensory deficit, weakness and headache. S/p tPA at [facility] 01/17/17 at 1925. CTA demonstrated no LVO; MRI was performed and demonstrated no acute intracranial pathology.    * Acute right MCA stroke  - 51/F with L-sided numbness and weakness s/p tPA 01/17. MRI demonstrated no acute abnormalities.  - Antithrombotics for secondary stroke prevention: Aspirin 325mg  PO daily  - Statins  Atorvastatin- 40 mg oral daily  - Aggressive risk factor modification: Hypertension and High Cholesterol  - Rehab Efforts: Physical Therapy, Occupational Therapy and Speech and Language Pathology  - Diagnostics: Ordered/Pending HgbA1C to assess blood glucose levels, MRI head without contrast to assess brain parenchyma, Trans-thoracic cardiac echo to assess cardiac function/status  - VTE Prophylaxis: None: Reason for No Pharmacological VTE Prophylaxis: Mechanical prophylaxis: Place SCDs, heparin 5000U subQ q8hr  - BP Parameters: SBP <180  - Nursing Orders: Neuro checks- Q1H, Swallowing evaluation by Nursing, Seizure precautions, Out of bed BID, Avoid Goel catheter, Pneumatic compression device, Stroke Education, Blood glucose target 100-130, Up with assistance    Left hemiparesis  - As under stroke.    Hyperlipidemia  - Risk factor for stroke.  - Continuing atorvastatin 40mg PO daily.    HTN (hypertension), benign  - Risk factor for stroke.  - Given s/p tPA, goal SBP < 180.  - Did not require nicardipine gtt after admission.  - Will require close outpatient follow-up after discharge.    JARETH Bergman MD   PGY-2   [phone]

## 2017-01-19 NOTE — PT/OT/SLP PROGRESS
"Physical Therapy  Treatment    Shun Tidwell   MRN: [id number]   Admitting Diagnosis: Acute right MCA stroke    PT Received On: 17  PT Start Time: 841     PT Stop Time: 912    PT Total Time (min): 31 min       Billable Minutes:  Therapeutic Activity 31 min    Treatment Type: Treatment  PT/PTA: PT             General Precautions: Standard, aspiration, fall  Orthopedic Precautions: N/A   Braces: N/A         Subjective:  Communicated with RN prior to session.  Pt reports that her daughter does not want to let her move back in because she has had a stroke.    Pain Ratin/10  Location - Orientation: medial  Location: chest (pt reports that she gets this pain "because of her nerves" and that she has anxiety)  Pain Addressed: Nurse notified, reports that she is aware of this pain  Pain Rating Post-Intervention: 6/10    Objective:   Patient found with: blood pressure cuff, peripheral IV, pulse ox (continuous), telemetry, SCD    Functional Mobility:  Bed Mobility:   Rolling/Turning to Left: Stand by assistance  Supine to Sit: Stand by Assistance (to L)    Transfers:  Sit <> Stand Assistance: Stand By Assistance  Sit <> Stand Assistive Device: No Assistive Device    Gait:   Gait Distance: 100 ft  Assistance 1: Contact Guard Assistance (due to decreased pt dynamic balance)  Gait Assistive Device: No device  Gait Pattern: reciprocal  Gait Deviation(s): No significant gait deviations noted.      Tinetti Gait and Balance Assessment     Assistive Device  Normally Used: No  Assistive Device During Testing: No       Balance Tests:  1. Sitting Balance:          Steady; safe = 1  2. Arises :        Able, without using arms = 2  3. Attempts to arise :        Able to arise, 1 attempt = 2  4. Immediate standing Balance :        Steady without walker or other support = 2  5. Standing balance:         Steady but wide stance (medal heel>4" apart) & uses cane or other support = 1  6. Nudged :        Begins to fall = 0 ( pt began to " fall when pushed backwards or to her R)  7. Eyes closed:         Unsteady = 0  8. Turning 360 degrees:         Discontinuous steps = 0 and Steady = 1  9.Sitting Down :         Uses arms or not a smooth motion = 1       Balance Score  10/16    Gait Tests:  10. Initiation of Gait:         No hesitancy = 1  11. Step length and height :        Right swing foot:  Does not pass left stance foot with step = 0 and Completely clears floor with step = 1 and Left swing foot:  Does not pass left stance foot with step = 0 and Completely clears floor with step = 1  12. Step symmetry         Right & Left step length appear equal = 1  13. Step continuity :        Steps appear continuous = 1  14. Path :        Straight without walking aid = 2  15. Trunk :          No sway, but flexion of knees or back or spreads arm out while walking = 1  16. Walking stance width          Heels apart = 0       Gait Score:    8/12     Balance + Gait Score: 18 /28       25-28= Low fall risk  19-24 Medium fall risk  < 19= High fall risk    Therapeutic Activities and Exercises:  Standing balance activities: Narrow Base of Support (KATHE) (30 sec); Narrow KATHE with eyes closed (30 sec); Narrow KATHE with perturbations all directions (30 sec)    AM-PAC 6 CLICK MOBILITY  How much help from another person does this patient currently need?   1 = Unable, Total/Dependent Assistance  2 = A lot, Maximum/Moderate Assistance  3 = A little, Minimum/Contact Guard/Supervision  4 = None, Modified [place]/Independent    Turning over in bed (including adjusting bedclothes, sheets and blankets)?: 4  Sitting down on and standing up from a chair with arms (e.g., wheelchair, bedside commode, etc.): 3  Moving from lying on back to sitting on the side of the bed?: 3  Moving to and from a bed to a chair (including a wheelchair)?: 3  Need to walk in hospital room?: 3  Climbing 3-5 steps with a railing?: 3  Total Score: 19    AM-PAC Raw Score CMS G-Code Modifier Level of  Impairment Assistance   6 % Total / Unable   7 - 9 CM 80 - 100% Maximal Assist   10 - 14 CL 60 - 80% Moderate Assist   15 - 19 CK 40 - 60% Moderate Assist   20 - 22 CJ 20 - 40% Minimal Assist   23 CI 1-20% SBA / CGA   24 CH 0% Independent/ Mod I     Patient left up in chair with all lines intact, call button in reach and RN notified.    Assessment:  Shun Tidwell is a 51 y.o. female with a medical diagnosis of Acute right MCA stroke and presents with decreased endurance, decreased strength, and  impaired balance.  Ms. Tidwell is able to complete bed mobility and transfers without physical assistance, and exhibits functional seated balance and trunk control. She demonstrates functional static balance in standing without an assistive device, but she requires CGA/min A to maintain her balance with challenges and perturbations.  Due to this decreased dynamic balance, Ms. Tidwell is at risk of falling (Tinetti score 18/28), especially with more challenging environments and during community ambulation.  She would continue to benefit from skilled PT services for strengthening, endurance training, and NMR/balance training in order to decrease her risk of falls and increase her independence with functional mobility.      Rehab identified problem list/impairments: Rehab identified problem list/impairments: weakness, impaired endurance, impaired balance, impaired strength    Rehab potential is good.    Activity tolerance: Good    Discharge recommendations: Discharge Facility/Level Of Care Needs: home with home health (pending home situation with daughter)    Barriers to discharge: Barriers to Discharge: Decreased caregiver support    Equipment recommendations: Equipment Needed After Discharge: cane, straight, bedside commode, shower chair     GOALS:   Physical Therapy Goals        Problem: Physical Therapy Goal    Goal Priority Disciplines Outcome Goal Variances Interventions   Physical Therapy Goal     PT/OT, PT Ongoing  (interventions implemented as appropriate)     Description:  Goals to be met by: 1/25/17     Patient will increase functional independence with mobility by performing:    Supine to sit with Modified [place]. NOT MET  Sit to supine with Modified [place]. NOT MET  Sit to stand transfer with Supervision using No Assistive Device and Rolling Walker. NOT MET  Bed to chair transfer via Stand Pivot with Supervision using No Assistive Device. NOT MET  Gait  x 150 feet with Stand-by Assistance using No Assistive Device and Rolling Walker. NOT MET  Stand for 15 minutes with Supervision using No Assistive Device with moderate balance challenges. NOT MET  Able to tolerate exercise for 15-20 reps with independence. NOT MET  Patient and family able to teachback stroke education independently. NOT MET                PLAN:    Patient to be seen 6 x/week  to address the above listed problems via gait training, therapeutic activities, therapeutic exercises, neuromuscular re-education  Plan of Care expires: 02/18/17  Plan of Care reviewed with: patient         Tyler Friedman, SPT  01/19/2017

## 2017-01-19 NOTE — SUBJECTIVE & OBJECTIVE
Neurologic Chief Complaint: L-sided weakness, s/p tPA 01/17/17    Subjective:     Interval History: Patient is seen for follow-up neurological assessment and treatment recommendations: No acute events overnight. Patient states symptoms continuing to improve. No other concerns at this time.    HPI, Past Medical, Family, and Social History remains the same as documented in the initial encounter.     Review of Systems   Constitutional: Negative for chills and fever.   Respiratory: Negative for cough and shortness of breath.    Cardiovascular: Negative for chest pain and palpitations.   Gastrointestinal: Negative for abdominal pain, nausea and vomiting.     Scheduled Meds:   aspirin  325 mg Oral Daily    atorvastatin  40 mg Oral Daily    famotidine  20 mg Oral BID    heparin (porcine)  5,000 Units Subcutaneous Q8H    senna-docusate 8.6-50 mg  1 tablet Oral BID    sodium chloride 0.9%  3 mL Intravenous Q8H     Continuous Infusions:   sodium chloride 0.9% 75 mL/hr at 01/19/17 1605     PRN Meds:acetaminophen, clonazePAM, dextrose 50%, glucagon (human recombinant), insulin aspart, labetalol, ondansetron    Objective:     Vital Signs (Most Recent):  Temp: 97.7 °F (36.5 °C) (01/19/17 1505)  Pulse: 65 (01/19/17 1605)  Resp: 17 (01/19/17 1605)  BP: 138/67 (01/19/17 1605)  SpO2: 100 % (01/19/17 1605)  BP Location: Right arm    Vital Signs Range (Last 24H):  Temp:  [97.7 °F (36.5 °C)-98.3 °F (36.8 °C)]   Pulse:  [49-85]   Resp:  [13-42]   BP: (110-166)/(62-96)   SpO2:  [95 %-100 %]   BP Location: Right arm    Physical Exam   Constitutional: She is oriented to person, place, and time. She appears well-developed. No distress.   thin   HENT:   Head: Normocephalic and atraumatic.   Nose: Nose normal.   Mouth/Throat: Oropharynx is clear and moist.   Eyes: Conjunctivae are normal. Pupils are equal, round, and reactive to light.   Cardiovascular: Normal rate, regular rhythm, normal heart sounds and intact distal pulses.     Pulmonary/Chest: Effort normal and breath sounds normal.   Abdominal: Soft. Bowel sounds are normal. She exhibits no distension. There is no tenderness.   Musculoskeletal: She exhibits no edema.   Neurological: She is alert and oriented to person, place, and time.   Skin: Skin is warm and dry. No rash noted.   Vitals reviewed.    Neurological Exam:   LOC: alert and follows requests  Language: No aphasia  Speech: Dysarthria  Orientation: Person, Place, Time  Memory: Recent memory intact, Remote memory intact, Age correct, Month correct  Visual Fields (CN II): Full  EOM (CN III, IV, VI): Full/intact  Oculocephalics: normal  Pupils (CN III, IV, VI): PERRL  Facial Sensation (CN V): reports abrupt change in sensation when crossing distinct midline. numbness left per patient  Facial Movement (CN VII): symmetric facial expression  Hearing (CN VIII): intact bilaterally  Gag Reflex (CN IX, X): normal/symmetric  Shoulder/Neck (CN XI): SCM-Left: Normal ; SCM-Right: Normal ; Shoulder Shrug: Normal/Symetric  Motor*: Arm Left:  Normal (5/5), Leg Left:   Normal (5/5), Arm Right:   Normal (5/5), Leg Right:   Normal (5/5)  Cerebellar*: Normal limb  Sensation: meredith-hypoesthesia left  Tone: Arm-Left: normal; Leg-Left: normal; Arm-Right: normal; Leg-Right: normal  Mental status: Alert, oriented x 3, normal attention and concentration, normal thought process  Cranial nerves: CNII: PERRL, visual fields intact. CNIII, IV, VI: EOMI. CNV, VII: Facial muscles symmetrical with no noted weakness. Sensation decreased to light touch and temperature L side. CNVIII: hearing intact. CNIX, X, XII: tongue and palatal motion intact, normal phonation and cough. CNXI: strength 5/5 R, 4-/5 L.  Muscular: Normal bulk and tone, strength 4/5 LUE, LLE, 5/5 RUE, RLE.  Sensory: Decreased sensation on L side to light touch and temperature.  Cerebellar: Normal finger-nose-finger and heel-to-shin bilaterally.  Reflexes: Biceps, triceps, brachioradialis, patellar  and ankle 2 bilaterally.    NIH Stroke Scale:    Level of Consciousness: 0 - alert  LOC Questions: 0 - answers both correctly  LOC Commands: 0 - performs both correctly  Best Gaze: 0 - normal  Visual: 0 - no visual loss  Facial Palsy: 0 - normal  Motor Left Arm: 0 - no drift  Motor Right Arm: 0 - no drift  Motor Left Le - no drift  Motor Right Le - no drift  Limb Ataxia: 0 - absent  Sensory: 1 - mild to moderate loss  Best Language: 0 - no aphasia  Dysarthria: 1 - mild to moderate dysarthria  Extinction and Inattention: 0 - no neglect  NIH Stroke Scale Total: 2      Laboratory:  Recent Results (from the past 24 hour(s))   Troponin I    Collection Time: 17  5:18 PM   Result Value Ref Range    Troponin I <0.006 0.000 - 0.026 ng/mL   Basic metabolic panel    Collection Time: 17  5:18 PM   Result Value Ref Range    Sodium 142 136 - 145 mmol/L    Potassium 4.0 3.5 - 5.1 mmol/L    Chloride 109 95 - 110 mmol/L    CO2 28 23 - 29 mmol/L    Glucose 111 (H) 70 - 110 mg/dL    BUN, Bld 9 6 - 20 mg/dL    Creatinine 0.8 0.5 - 1.4 mg/dL    Calcium 8.6 (L) 8.7 - 10.5 mg/dL    Anion Gap 5 (L) 8 - 16 mmol/L    eGFR if African American >60.0 >60 mL/min/1.73 m^2    eGFR if non African American >60.0 >60 mL/min/1.73 m^2   POCT glucose    Collection Time: 17 11:01 PM   Result Value Ref Range    POCT Glucose 60 (L) 70 - 110 mg/dL   POCT glucose    Collection Time: 17 11:11 PM   Result Value Ref Range    POCT Glucose 94 70 - 110 mg/dL   Comprehensive metabolic panel    Collection Time: 17  2:12 AM   Result Value Ref Range    Sodium 142 136 - 145 mmol/L    Potassium 4.0 3.5 - 5.1 mmol/L    Chloride 110 95 - 110 mmol/L    CO2 25 23 - 29 mmol/L    Glucose 98 70 - 110 mg/dL    BUN, Bld 10 6 - 20 mg/dL    Creatinine 0.8 0.5 - 1.4 mg/dL    Calcium 8.4 (L) 8.7 - 10.5 mg/dL    Total Protein 5.7 (L) 6.0 - 8.4 g/dL    Albumin 3.0 (L) 3.5 - 5.2 g/dL    Total Bilirubin 0.2 0.1 - 1.0 mg/dL    Alkaline Phosphatase 51  (L) 55 - 135 U/L    AST 13 10 - 40 U/L    ALT 35 10 - 44 U/L    Anion Gap 7 (L) 8 - 16 mmol/L    eGFR if African American >60.0 >60 mL/min/1.73 m^2    eGFR if non African American >60.0 >60 mL/min/1.73 m^2   CBC auto differential    Collection Time: 01/19/17  2:12 AM   Result Value Ref Range    WBC 4.12 3.90 - 12.70 K/uL    RBC 4.05 4.00 - 5.40 M/uL    Hemoglobin 12.3 12.0 - 16.0 g/dL    Hematocrit 36.4 (L) 37.0 - 48.5 %    MCV 90 82 - 98 fL    MCH 30.4 27.0 - 31.0 pg    MCHC 33.8 32.0 - 36.0 %    RDW 12.9 11.5 - 14.5 %    Platelets 135 (L) 150 - 350 K/uL    MPV 10.8 9.2 - 12.9 fL    Gran # 2.3 1.8 - 7.7 K/uL    Lymph # 1.5 1.0 - 4.8 K/uL    Mono # 0.2 (L) 0.3 - 1.0 K/uL    Eos # 0.1 0.0 - 0.5 K/uL    Baso # 0.02 0.00 - 0.20 K/uL    Gran% 55.0 38.0 - 73.0 %    Lymph% 36.2 18.0 - 48.0 %    Mono% 5.6 4.0 - 15.0 %    Eosinophil% 2.7 0.0 - 8.0 %    Basophil% 0.5 0.0 - 1.9 %    Differential Method Automated    Magnesium    Collection Time: 01/19/17  2:12 AM   Result Value Ref Range    Magnesium 2.0 1.6 - 2.6 mg/dL   Phosphorus    Collection Time: 01/19/17  2:12 AM   Result Value Ref Range    Phosphorus 3.6 2.7 - 4.5 mg/dL   POCT glucose    Collection Time: 01/19/17  9:41 AM   Result Value Ref Range    POCT Glucose 164 (H) 70 - 110 mg/dL   POCT glucose    Collection Time: 01/19/17  1:58 PM   Result Value Ref Range    POCT Glucose 81 70 - 110 mg/dL   2D echo with color flow doppler and bubble contrast    Collection Time: 01/19/17  2:00 PM   Result Value Ref Range    EF 65 55 - 65    Diastolic Dysfunction No      Diagnostic Results:  CTA Stroke Multi-phase 01/17/17:  No CT evidence of acute infarction or intracranial hemorrhage in this patient is status post tPA. No evidence of vascular high-grade stenosis, aneurysm, dissection or AVM. Mild calcific atherosclerosis at the origin of the left common carotid artery resulting in 20% stenosis by NASCET criteria. Stable hypodense lesion identified in the right basal ganglia, may  represent remote lacunar type infarct versus prominent perivascular space.    MRI Brain WO Contrast 01/17/17:  No acute intracranial pathology, specifically without evidence for infarct or hemorrhage. Prominent perivascular space within the right lentiform nucleus. Mild age advanced cerebral volume loss and chronic white matter changes as above.

## 2017-01-19 NOTE — PROGRESS NOTES
Bubble study done x1 with valsalva . Via left forearm sl. Tolerated well. Sl flushed before and aftere with ns.

## 2017-01-19 NOTE — PROGRESS NOTES
Pt arrived on unit via stretcher; accompanied by RN. VSS. Dr. Rutledge notified. Will continue to monitor.

## 2017-01-19 NOTE — ED NOTES
Assumed care of patient. Patient resting comfortably. Patient in NAD and offers no complaints at this time. Bed placed in low/locked position, side rails up x 2, call light within reach, plan of care provided to patient/family, alarms set and turned on for monitoring.  Awaiting additional orders and/or results; will continue to monitor.

## 2017-01-20 VITALS
SYSTOLIC BLOOD PRESSURE: 125 MMHG | HEIGHT: 62 IN | DIASTOLIC BLOOD PRESSURE: 82 MMHG | HEART RATE: 81 BPM | RESPIRATION RATE: 16 BRPM | OXYGEN SATURATION: 99 % | TEMPERATURE: 98 F | WEIGHT: 119.69 LBS | BODY MASS INDEX: 22.03 KG/M2

## 2017-01-20 LAB
ALBUMIN SERPL BCP-MCNC: 3.1 G/DL
ALP SERPL-CCNC: 58 U/L
ALT SERPL W/O P-5'-P-CCNC: 27 U/L
ANION GAP SERPL CALC-SCNC: 13 MMOL/L
AST SERPL-CCNC: 10 U/L
BASOPHILS # BLD AUTO: 0.04 K/UL
BASOPHILS NFR BLD: 1 %
BILIRUB SERPL-MCNC: 0.2 MG/DL
BUN SERPL-MCNC: 14 MG/DL
CALCIUM SERPL-MCNC: 8.5 MG/DL
CHLORIDE SERPL-SCNC: 106 MMOL/L
CO2 SERPL-SCNC: 20 MMOL/L
CREAT SERPL-MCNC: 0.8 MG/DL
DIFFERENTIAL METHOD: ABNORMAL
EOSINOPHIL # BLD AUTO: 0.1 K/UL
EOSINOPHIL NFR BLD: 2.4 %
ERYTHROCYTE [DISTWIDTH] IN BLOOD BY AUTOMATED COUNT: 12.8 %
EST. GFR  (AFRICAN AMERICAN): >60 ML/MIN/1.73 M^2
EST. GFR  (NON AFRICAN AMERICAN): >60 ML/MIN/1.73 M^2
GLUCOSE SERPL-MCNC: 173 MG/DL
HCT VFR BLD AUTO: 34.2 %
HGB BLD-MCNC: 12.2 G/DL
LYMPHOCYTES # BLD AUTO: 1.4 K/UL
LYMPHOCYTES NFR BLD: 33.7 %
MAGNESIUM SERPL-MCNC: 2 MG/DL
MCH RBC QN AUTO: 30.5 PG
MCHC RBC AUTO-ENTMCNC: 35.7 %
MCV RBC AUTO: 86 FL
MONOCYTES # BLD AUTO: 0.2 K/UL
MONOCYTES NFR BLD: 5.3 %
NEUTROPHILS # BLD AUTO: 2.4 K/UL
NEUTROPHILS NFR BLD: 57.1 %
PHOSPHATE SERPL-MCNC: 3.9 MG/DL
PLATELET # BLD AUTO: 153 K/UL
PMV BLD AUTO: 11.3 FL
POCT GLUCOSE: 73 MG/DL (ref 70–110)
POTASSIUM SERPL-SCNC: 3.7 MMOL/L
PROT SERPL-MCNC: 6.1 G/DL
RBC # BLD AUTO: 4 M/UL
SODIUM SERPL-SCNC: 139 MMOL/L
WBC # BLD AUTO: 4.12 K/UL

## 2017-01-20 PROCEDURE — 97110 THERAPEUTIC EXERCISES: CPT

## 2017-01-20 PROCEDURE — 97112 NEUROMUSCULAR REEDUCATION: CPT

## 2017-01-20 PROCEDURE — 25000003 PHARM REV CODE 250: Performed by: NURSE PRACTITIONER

## 2017-01-20 PROCEDURE — 84100 ASSAY OF PHOSPHORUS: CPT

## 2017-01-20 PROCEDURE — 25000003 PHARM REV CODE 250: Performed by: PSYCHIATRY & NEUROLOGY

## 2017-01-20 PROCEDURE — 99232 SBSQ HOSP IP/OBS MODERATE 35: CPT | Mod: ,,, | Performed by: PHYSICIAN ASSISTANT

## 2017-01-20 PROCEDURE — 99233 SBSQ HOSP IP/OBS HIGH 50: CPT | Mod: ,,, | Performed by: PSYCHIATRY & NEUROLOGY

## 2017-01-20 PROCEDURE — 85025 COMPLETE CBC W/AUTO DIFF WBC: CPT

## 2017-01-20 PROCEDURE — 80053 COMPREHEN METABOLIC PANEL: CPT

## 2017-01-20 PROCEDURE — 25000003 PHARM REV CODE 250: Performed by: STUDENT IN AN ORGANIZED HEALTH CARE EDUCATION/TRAINING PROGRAM

## 2017-01-20 PROCEDURE — 83735 ASSAY OF MAGNESIUM: CPT

## 2017-01-20 RX ORDER — IBUPROFEN 200 MG
16 TABLET ORAL
Status: DISCONTINUED | OUTPATIENT
Start: 2017-01-20 | End: 2017-01-20 | Stop reason: HOSPADM

## 2017-01-20 RX ORDER — POTASSIUM CHLORIDE 750 MG/1
10 CAPSULE, EXTENDED RELEASE ORAL ONCE
Status: COMPLETED | OUTPATIENT
Start: 2017-01-20 | End: 2017-01-20

## 2017-01-20 RX ORDER — ASPIRIN 325 MG
325 TABLET ORAL DAILY
Qty: 30 TABLET | Refills: 1 | Status: SHIPPED | OUTPATIENT
Start: 2017-01-20 | End: 2018-01-20

## 2017-01-20 RX ORDER — INSULIN ASPART 100 [IU]/ML
1-10 INJECTION, SOLUTION INTRAVENOUS; SUBCUTANEOUS
Status: DISCONTINUED | OUTPATIENT
Start: 2017-01-20 | End: 2017-01-20 | Stop reason: HOSPADM

## 2017-01-20 RX ORDER — GLUCAGON 1 MG
1 KIT INJECTION
Status: DISCONTINUED | OUTPATIENT
Start: 2017-01-20 | End: 2017-01-20 | Stop reason: HOSPADM

## 2017-01-20 RX ORDER — IBUPROFEN 200 MG
24 TABLET ORAL
Status: DISCONTINUED | OUTPATIENT
Start: 2017-01-20 | End: 2017-01-20 | Stop reason: HOSPADM

## 2017-01-20 RX ADMIN — Medication 3 ML: at 05:01

## 2017-01-20 RX ADMIN — STANDARDIZED SENNA CONCENTRATE AND DOCUSATE SODIUM 1 TABLET: 8.6; 5 TABLET, FILM COATED ORAL at 09:01

## 2017-01-20 RX ADMIN — HEPARIN SODIUM 5000 UNITS: 5000 INJECTION, SOLUTION INTRAVENOUS; SUBCUTANEOUS at 05:01

## 2017-01-20 RX ADMIN — POTASSIUM CHLORIDE 10 MEQ: 750 CAPSULE, EXTENDED RELEASE ORAL at 05:01

## 2017-01-20 RX ADMIN — CLONAZEPAM 1 MG: 1 TABLET ORAL at 09:01

## 2017-01-20 RX ADMIN — ASPIRIN 325 MG ORAL TABLET 325 MG: 325 PILL ORAL at 09:01

## 2017-01-20 RX ADMIN — ATORVASTATIN CALCIUM 40 MG: 20 TABLET, FILM COATED ORAL at 09:01

## 2017-01-20 RX ADMIN — FAMOTIDINE 20 MG: 20 TABLET, FILM COATED ORAL at 09:01

## 2017-01-20 NOTE — PROGRESS NOTES
ICU Progress Note  Neurocritical Care    Admit Date: 1/17/2017  LOS: 3    CC: Acute right MCA stroke    SUBJECTIVE:     Interval History/Significant Events: NAEON.  MRI brain negative.  Pt boarding for vascular neurology.      Review of Symptoms: leg pain   Constitutional: Denies fevers or chills.  Pulmonary: Denies shortness of breath or cough.  Cardiology: Denies chest pain or palpitations.  GI: Denies abdominal pain or constipation.  Neurologic: Denies new weakness, headaches, or paresthesias.     Medications:  Continuous Infusions:   sodium chloride 0.9% 75 mL/hr at 01/20/17 0701     Scheduled Meds:   aspirin  325 mg Oral Daily    atorvastatin  40 mg Oral Daily    famotidine  20 mg Oral BID    heparin (porcine)  5,000 Units Subcutaneous Q8H    senna-docusate 8.6-50 mg  1 tablet Oral BID    sodium chloride 0.9%  3 mL Intravenous Q8H     PRN Meds:.acetaminophen, clonazePAM, dextrose 50%, dextrose 50%, glucagon (human recombinant), glucose, glucose, insulin aspart, labetalol, ondansetron    OBJECTIVE:     I & O (24h):  Intake/Output Summary (Last 24 hours) at 01/20/17 1219  Last data filed at 01/20/17 0701   Gross per 24 hour   Intake             1660 ml   Output                0 ml   Net             1660 ml       Physical Exam:  Last Vitals:  Vitals:    01/20/17 0701   BP: 123/63   Pulse: 66   Resp: 16   Temp: 97.9 °F (36.6 °C)     Vital Signs (24h Range):   Temp:  [97.7 °F (36.5 °C)-98 °F (36.7 °C)] 97.9 °F (36.6 °C)  Pulse:  [62-78] 66  Resp:  [13-48] 16  SpO2:  [96 %-100 %] 98 %  BP: (115-165)/() 123/63  GA: Alert, comfortable, no acute distress.   HEENT: No scleral icterus or JVD.   Pulmonary: Clear to auscultation A/P/L. No wheezing, crackles, or rhonchi.  Cardiac: RRR S1 & S2 w/o rubs/murmurs/gallops.   Abdominal: Bowel sounds present x 4. No appreciable hepatosplenomegaly.  Skin: No jaundice, rashes, or visible lesions.  Neuro:  --GCS: E4 V5 M6  --Mental Status:  Alert and oriented x 4.  --CN  II-XII grossly intact.   --Pupils 3mm, PERRL.   --Corneal reflex, gag, cough intact.  --LUE strength: 5/5  --RUE strength: 5/5  --LLE strength: 5/5  --RLE strength: 5/5    Nutrition: regular      Labs:  ABG: No results for input(s): PH, PO2, PCO2, HCO3, POCSATURATED, BE in the last 24 hours.    BMP:  Recent Labs  Lab 01/20/17  0212      K 3.7      CO2 20*   BUN 14   CREATININE 0.8   *   MG 2.0   PHOS 3.9       LFT: Lab Results   Component Value Date    AST 10 01/20/2017    ALT 27 01/20/2017    ALKPHOS 58 01/20/2017    BILITOT 0.2 01/20/2017    ALBUMIN 3.1 (L) 01/20/2017    PROT 6.1 01/20/2017       CBC:   Lab Results   Component Value Date    WBC 4.12 01/20/2017    HGB 12.2 01/20/2017    HCT 34.2 (L) 01/20/2017    MCV 86 01/20/2017     01/20/2017       Microbiology x 7d:   Microbiology Results (last 7 days)     ** No results found for the last 168 hours. **          Imaging:  MRI Brain 1/18/17  No acute intracranial pathology, specifically without evidence for infarct or hemorrhage.    Prominent perivascular space within the right lentiform nucleus.    Mild age advanced cerebral volume loss and chronic white matter changes as above.    ASSESSMENT/PLAN:     Patient Active Problem List   Diagnosis    Acute right MCA stroke    Left hemiparesis    Hyperlipidemia    HTN (hypertension), benign    Tissue plasminogen activator (t-PA) administered at other facility within 24 hours prior to current admission       Neuro:  R MCA s/p tpa  --boarding for vascular neurology   --aspirin 325 mg daily  --atorvastatin 40 mg daily   --PT/OT/SLP    Pulmonary:  --adequate sats on RA    Cardiac:  ---180    Renal:   --BUN, creatinine WNL  --adequate UOP  --daily CMP    ID:  --afebrile  --no leukocytosis    Hem/Onc:  --H/H stable  --platelets 153  --daily CBC    Endocrine:  --euglycemic    Fluids/Electrolytes/Nutrition/GI:  --regular diet  --d/c IVF    Level II    Lois Kenney PA-C  Neuro Critical  Care  [id number]

## 2017-01-20 NOTE — PLAN OF CARE
Problem: Patient Care Overview  Goal: Plan of Care Review  Outcome: Ongoing (interventions implemented as appropriate)  POC reviewed w/ Ms. Tidwell throughout the night; verbalized understanding. Questions and concerns addressed. Pt VSS. No acute events overnight. Will continue to monitor. See flowsheet for further assessment and VS info.

## 2017-01-20 NOTE — PT/OT/SLP DISCHARGE
Physical Therapy Discharge Summary    Shun Tidwell  MRN: [id number]   Acute right MCA stroke   Patient Discharged from acute Physical Therapy on 1/20/17.  Please refer to prior PT noted date on 1/20/17 for functional status.     Assessment:   Patient appropriate for care in another setting.  GOALS:   Physical Therapy Goals     Not on file      Multidisciplinary Problems (Resolved)        Problem: Physical Therapy Goal    Goal Priority Disciplines Outcome Goal Variances Interventions   Physical Therapy Goal   (Resolved)     PT/OT, PT Outcome(s) achieved     Description:  Goals to be met by: 1/25/17     Patient will increase functional independence with mobility by performing:    Supine to sit with Modified [place]. NOT MET  Sit to supine with Modified [place]. NOT MET  Sit to stand transfer with Supervision using No Assistive Device and Rolling Walker. NOT MET  Bed to chair transfer via Stand Pivot with Supervision using No Assistive Device. NOT MET  Gait  x 150 feet with Stand-by Assistance using No Assistive Device and Rolling Walker. NOT MET  Stand for 15 minutes with Supervision using No Assistive Device with moderate balance challenges. NOT MET  Able to tolerate exercise for 15-20 reps with independence. NOT MET  Patient and family able to teachback stroke education independently. NOT MET              Reasons for Discontinuation of Therapy Services  Transfer to alternate level of care.      Plan:  Patient Discharged to: Home with Home Health Service.  Sharona Brush PT, DPT  1/20/2017  [phone]

## 2017-01-20 NOTE — PLAN OF CARE
01/20/2017      Shun Tidwell  [address] [place] [id number]          Vascular Neurology Dept.  [facility] [address] [place] [address] [phone] after hours  [phone] fax Diagnosis:  Acute right MCA stroke                         Debility    Durable Medical Equipment:  Dispense the following for Home Use      1 Shower Bench      __________________________  OMAR Montes  01/20/2017

## 2017-01-20 NOTE — SUBJECTIVE & OBJECTIVE
Neurologic Chief Complaint: L-sided weakness, s/p tPA 01/17/17    Subjective:     Interval History: Patient is seen for follow-up neurological assessment and treatment recommendations: no events overnight, preparing for discharge    HPI, Past Medical, Family, and Social History remains the same as documented in the initial encounter.     Review of Systems   Constitutional: Negative for chills and fever.   Gastrointestinal: Positive for nausea. Negative for vomiting.   Neurological: Positive for numbness.        Bilateral LE    Psychiatric/Behavioral: Negative for behavioral problems.     Scheduled Meds:   aspirin  325 mg Oral Daily    atorvastatin  40 mg Oral Daily    famotidine  20 mg Oral BID    heparin (porcine)  5,000 Units Subcutaneous Q8H    senna-docusate 8.6-50 mg  1 tablet Oral BID    sodium chloride 0.9%  3 mL Intravenous Q8H     Continuous Infusions:   sodium chloride 0.9% 75 mL/hr at 01/20/17 0701     PRN Meds:acetaminophen, clonazePAM, dextrose 50%, dextrose 50%, glucagon (human recombinant), glucose, glucose, insulin aspart, labetalol, ondansetron    Objective:     Vital Signs (Most Recent):  Temp: 97.9 °F (36.6 °C) (01/20/17 0701)  Pulse: 66 (01/20/17 0701)  Resp: 16 (01/20/17 0701)  BP: 123/63 (01/20/17 0701)  SpO2: 98 % (01/20/17 0824)  BP Location: Right arm    Vital Signs Range (Last 24H):  Temp:  [97.7 °F (36.5 °C)-98 °F (36.7 °C)]   Pulse:  [62-78]   Resp:  [13-48]   BP: (115-165)/()   SpO2:  [96 %-100 %]   BP Location: Right arm    Physical Exam   Constitutional: She is oriented to person, place, and time. She appears well-developed and well-nourished. No distress.   HENT:   Head: Normocephalic and atraumatic.   Eyes: EOM are normal.   Cardiovascular: Normal rate.    Pulmonary/Chest: Effort normal.   Musculoskeletal: She exhibits no edema.   Neurological: She is alert and oriented to person, place, and time.   Skin: Skin is warm and dry.   Nursing note and vitals  reviewed.    Neurological Exam:   LOC: alert and follows requests  Language: No aphasia  Speech: Dysarthria  Orientation: Person, Place, Time  EOM (CN III, IV, VI): Full/intact  Pupils (CN III, IV, VI): PERRL  Facial Movement (CN VII): symmetric facial expression  Hearing (CN VIII): intact bilaterally  Gag Reflex (CN IX, X): normal/symmetric  Shoulder/Neck (CN XI): SCM-Left: Normal ; SCM-Right: Normal ; Shoulder Shrug: Normal/Symetric  Motor*: Arm Left:  Normal (5/5), Leg Left:   Normal (5/5), Arm Right:   Normal (5/5), Leg Right:   Normal (5/5)  Cerebellar*: Normal limb  Sensation: meredith-hypoesthesia left  Tone: Arm-Left: normal; Leg-Left: normal; Arm-Right: normal; Leg-Right: normal      NIH Stroke Scale:    Level of Consciousness: 0 - alert  LOC Questions: 0 - answers both correctly  LOC Commands: 0 - performs both correctly  Best Gaze: 0 - normal  Visual: 0 - no visual loss  Facial Palsy: 0 - normal  Motor Left Arm: 0 - no drift  Motor Right Arm: 0 - no drift  Motor Left Le - no drift  Motor Right Le - no drift  Limb Ataxia: 0 - absent  Sensory: 1 - mild to moderate loss  Best Language: 0 - no aphasia  Dysarthria: 0 - normal articulation  Extinction and Inattention: 0 - no neglect  NIH Stroke Scale Total: 1      Laboratory:  Recent Results (from the past 24 hour(s))   POCT glucose    Collection Time: 17  1:58 PM   Result Value Ref Range    POCT Glucose 81 70 - 110 mg/dL   2D echo with color flow doppler and bubble contrast    Collection Time: 17  2:00 PM   Result Value Ref Range    EF 65 55 - 65    Diastolic Dysfunction No    POCT glucose    Collection Time: 17  7:15 PM   Result Value Ref Range    POCT Glucose 89 70 - 110 mg/dL   POCT glucose    Collection Time: 17  9:17 PM   Result Value Ref Range    POCT Glucose 211 (H) 70 - 110 mg/dL   Comprehensive metabolic panel    Collection Time: 17  2:12 AM   Result Value Ref Range    Sodium 139 136 - 145 mmol/L    Potassium 3.7 3.5  - 5.1 mmol/L    Chloride 106 95 - 110 mmol/L    CO2 20 (L) 23 - 29 mmol/L    Glucose 173 (H) 70 - 110 mg/dL    BUN, Bld 14 6 - 20 mg/dL    Creatinine 0.8 0.5 - 1.4 mg/dL    Calcium 8.5 (L) 8.7 - 10.5 mg/dL    Total Protein 6.1 6.0 - 8.4 g/dL    Albumin 3.1 (L) 3.5 - 5.2 g/dL    Total Bilirubin 0.2 0.1 - 1.0 mg/dL    Alkaline Phosphatase 58 55 - 135 U/L    AST 10 10 - 40 U/L    ALT 27 10 - 44 U/L    Anion Gap 13 8 - 16 mmol/L    eGFR if African American >60.0 >60 mL/min/1.73 m^2    eGFR if non African American >60.0 >60 mL/min/1.73 m^2   CBC auto differential    Collection Time: 01/20/17  2:12 AM   Result Value Ref Range    WBC 4.12 3.90 - 12.70 K/uL    RBC 4.00 4.00 - 5.40 M/uL    Hemoglobin 12.2 12.0 - 16.0 g/dL    Hematocrit 34.2 (L) 37.0 - 48.5 %    MCV 86 82 - 98 fL    MCH 30.5 27.0 - 31.0 pg    MCHC 35.7 32.0 - 36.0 %    RDW 12.8 11.5 - 14.5 %    Platelets 153 150 - 350 K/uL    MPV 11.3 9.2 - 12.9 fL    Gran # 2.4 1.8 - 7.7 K/uL    Lymph # 1.4 1.0 - 4.8 K/uL    Mono # 0.2 (L) 0.3 - 1.0 K/uL    Eos # 0.1 0.0 - 0.5 K/uL    Baso # 0.04 0.00 - 0.20 K/uL    Gran% 57.1 38.0 - 73.0 %    Lymph% 33.7 18.0 - 48.0 %    Mono% 5.3 4.0 - 15.0 %    Eosinophil% 2.4 0.0 - 8.0 %    Basophil% 1.0 0.0 - 1.9 %    Differential Method Automated    Magnesium    Collection Time: 01/20/17  2:12 AM   Result Value Ref Range    Magnesium 2.0 1.6 - 2.6 mg/dL   Phosphorus    Collection Time: 01/20/17  2:12 AM   Result Value Ref Range    Phosphorus 3.9 2.7 - 4.5 mg/dL     Diagnostic Results:  CTA Stroke Multi-phase 01/17/17:  No CT evidence of acute infarction or intracranial hemorrhage in this patient is status post tPA. No evidence of vascular high-grade stenosis, aneurysm, dissection or AVM. Mild calcific atherosclerosis at the origin of the left common carotid artery resulting in 20% stenosis by NASCET criteria. Stable hypodense lesion identified in the right basal ganglia, may represent remote lacunar type infarct versus prominent  perivascular space.    MRI Brain WO Contrast 01/17/17:  No acute intracranial pathology, specifically without evidence for infarct or hemorrhage. Prominent perivascular space within the right lentiform nucleus. Mild age advanced cerebral volume loss and chronic white matter changes as above.

## 2017-01-20 NOTE — PT/OT/SLP PROGRESS
"Physical Therapy  Treatment    Shun Tidwell   MRN: [id number]   Admitting Diagnosis: Acute right MCA stroke    PT Received On: 17  PT Start Time: 1013     PT Stop Time: 1042    PT Total Time (min): 29 min       Billable Minutes:  Therapeutic Activity 19 min and Neuromuscular Re-education 10 min    Treatment Type: Treatment  PT/PTA: PT             General Precautions: Standard, aspiration, fall  Orthopedic Precautions: N/A   Braces: N/A         Subjective:  Communicated with RN prior to session.    "They want to send me home, but I'm not ready to go home."    Pain Ratin/10  Location - Side: Bilateral  Location - Orientation: lateral  Location: leg  Pain Addressed: Reposition  Pain Rating Post-Intervention: 7/10   Pt reports bilateral burning pain in her legs that has been going on for a while.  She has not reported this pain to PT prior to today.     Objective:   Patient found with: blood pressure cuff, telemetry, pulse ox (continuous), peripheral IV    Functional Mobility:  Bed Mobility:   Rolling/Turning to Left: Stand by assistance  Supine to Sit to L: Stand by Assistance  Sit to Supine: Stand by Assistance    Transfers:  Sit <> Stand Assistance: Stand By Assistance  Sit <> Stand Assistive Device: No Assistive Device    Gait:   Gait Distance: 150 ft (accompanied by RN with all lines intact on portable telemetry box)  Assistance 1: Contact Guard Assistance.  Pt required min assist for loss of balance that occurred while she was trying to stop walking suddenly to talk to PT.  Gait Assistive Device: No device  Gait Pattern: reciprocal  Gait Deviation(s): No significant gait deviations noted.        Therapeutic Activities and Exercises:  Standing balance activities with bedside table for UE support: Narrow Base of Support (KATHE) (30 sec), narrow KATHE eyes closed (30 sec), narrow KATHE eyes closed with multidirectional perturbations (30 sec)    Standing exercises with bedside table for UE support: Lisa (1x10 " bilateral), hip abduction (1x10 bilateral)      AM-PAC 6 CLICK MOBILITY  How much help from another person does this patient currently need?   1 = Unable, Total/Dependent Assistance  2 = A lot, Maximum/Moderate Assistance  3 = A little, Minimum/Contact Guard/Supervision  4 = None, Modified [place]/Independent    Turning over in bed (including adjusting bedclothes, sheets and blankets)?: 4  Sitting down on and standing up from a chair with arms (e.g., wheelchair, bedside commode, etc.): 3  Moving from lying on back to sitting on the side of the bed?: 3  Moving to and from a bed to a chair (including a wheelchair)?: 3  Need to walk in hospital room?: 3  Climbing 3-5 steps with a railing?: 3  Total Score: 19    AM-PAC Raw Score CMS G-Code Modifier Level of Impairment Assistance   6 % Total / Unable   7 - 9 CM 80 - 100% Maximal Assist   10 - 14 CL 60 - 80% Moderate Assist   15 - 19 CK 40 - 60% Moderate Assist   20 - 22 CJ 20 - 40% Minimal Assist   23 CI 1-20% SBA / CGA   24 CH 0% Independent/ Mod I     Patient left up in chair with all lines intact and PA present.    Assessment:  Shun Tidwell is a 51 y.o. female with a medical diagnosis of Acute right MCA stroke and presents with decreased strength, decreased endurance, and impaired balance.  She was able to ambulate 150 ft today without an assistive device, but she continues to require CGA due to her increased fall risk.  Ms. Tidwell continues to demonstrate functional balance in controlled and static environments, but her dynamic balance and reactions to perturbations and obstacles continue to be impaired.  She would continue to benefit from skilled PT services for strengthening, NMR/balance training, and gait training in order to increase her independence with functional mobility and better prepare her for community ambulation.  Ms. Tidwell says that she does not think she is ready to leave, and today she reported bilateral leg pain that she says has been  going on for a while but she has never mentioned to PT before.         Rehab identified problem list/impairments: Rehab identified problem list/impairments: weakness, impaired endurance, impaired sensation, impaired self care skills, impaired functional mobilty, impaired balance, decreased upper extremity function    Rehab potential is good.    Activity tolerance: Good    Discharge recommendations: Discharge Facility/Level Of Care Needs: home health PT     Barriers to discharge: Barriers to Discharge: Decreased caregiver support    Equipment recommendations: Equipment Needed After Discharge: cane, straight, bedside commode, shower chair     GOALS:   Physical Therapy Goals     Not on file      Multidisciplinary Problems (Resolved)        Problem: Physical Therapy Goal    Goal Priority Disciplines Outcome Goal Variances Interventions   Physical Therapy Goal   (Resolved)     PT/OT, PT Outcome(s) achieved     Description:  Goals to be met by: 1/25/17     Patient will increase functional independence with mobility by performing:    Supine to sit with Modified [place]. NOT MET  Sit to supine with Modified [place]. NOT MET  Sit to stand transfer with Supervision using No Assistive Device and Rolling Walker. NOT MET  Bed to chair transfer via Stand Pivot with Supervision using No Assistive Device. NOT MET  Gait  x 150 feet with Stand-by Assistance using No Assistive Device and Rolling Walker. NOT MET  Stand for 15 minutes with Supervision using No Assistive Device with moderate balance challenges. NOT MET  Able to tolerate exercise for 15-20 reps with independence. NOT MET  Patient and family able to teachback stroke education independently. NOT MET                PLAN:    Patient to be seen 6 x/week  to address the above listed problems via gait training, therapeutic activities, therapeutic exercises, neuromuscular re-education  Plan of Care expires: 02/18/17  Plan of Care reviewed with: patient         Tyler  Idalia, SPT  01/20/2017

## 2017-01-20 NOTE — PLAN OF CARE
Pt referred to [facility] ph: [phone] f: [phone].     Patel Baird, BALJINDER   [id number]

## 2017-01-20 NOTE — NURSING TRANSFER
Nursing Transfer Note      1/20/2017     Transfer From: [facility] Room 7079 > [facility] Room 7011    Transfer via wheelchair    Transfer with cardiac monitoring and personal belongings    Transported by Jesica RN and Gael RN    Medicines sent: Yes    Chart send with patient: Yes    Notified: daughter    Patient reassessed at: 01/20/17 @ 1400    Upon arrival to floor: cardiac monitor applied, patient oriented to room, call bell in reach and bed in lowest position, wheels locked and nurse notified of patient in room.

## 2017-01-20 NOTE — PLAN OF CARE
01/20/2017      Shun Tidwell  [address] [place] [id number]          Vascular Neurology Dept.  [facility] [address] [place] [address] [phone] after hours  [phone] fax Diagnosis:  Acute right MCA stroke                         Debility      Please eval and treat for outpatient pt ot and speech therapy post stroke            __________________________  OMAR Montes /Dr Audie Fernandez MD   01/20/2017

## 2017-01-20 NOTE — ASSESSMENT & PLAN NOTE
- 51/F with L-sided numbness and weakness s/p tPA 01/17. MRI demonstrated no acute abnormalities.  - Antithrombotics for secondary stroke prevention: Aspirin 325mg PO daily  - Statins  Atorvastatin- 40 mg oral daily  - Aggressive risk factor modification: Hypertension and High Cholesterol  - Rehab Efforts: Physical Therapy, Occupational Therapy and Speech and Language Pathology  - Diagnostics: Ordered/Pending HgbA1C to assess blood glucose levels, MRI head without contrast to assess brain parenchyma, Trans-thoracic cardiac echo to assess cardiac function/status  - VTE Prophylaxis: None: Reason for No Pharmacological VTE Prophylaxis: Mechanical prophylaxis: Place SCDs, heparin 5000U subQ q8hr  - BP Parameters: SBP <180  - Nursing Orders: Neuro checks- Q1H, Swallowing evaluation by Nursing, Seizure precautions, Out of bed BID, Avoid Goel catheter, Pneumatic compression device, Stroke Education, Blood glucose target 100-130, Up with assistance    Preparing for step down.   Working on discharge planning

## 2017-01-20 NOTE — PROGRESS NOTES
[facility]  Vascular Neurology  Comprehensive Stroke Center  Progress Note      Neurologic Chief Complaint: L-sided weakness, s/p tPA 01/17/17    Subjective:     Interval History: Patient is seen for follow-up neurological assessment and treatment recommendations: no events overnight, preparing for discharge    HPI, Past Medical, Family, and Social History remains the same as documented in the initial encounter.     Review of Systems   Constitutional: Negative for chills and fever.   Gastrointestinal: Positive for nausea. Negative for vomiting.   Neurological: Positive for numbness.        Bilateral LE    Psychiatric/Behavioral: Negative for behavioral problems.     Scheduled Meds:   aspirin  325 mg Oral Daily    atorvastatin  40 mg Oral Daily    famotidine  20 mg Oral BID    heparin (porcine)  5,000 Units Subcutaneous Q8H    senna-docusate 8.6-50 mg  1 tablet Oral BID    sodium chloride 0.9%  3 mL Intravenous Q8H     Continuous Infusions:   sodium chloride 0.9% 75 mL/hr at 01/20/17 0701     PRN Meds:acetaminophen, clonazePAM, dextrose 50%, dextrose 50%, glucagon (human recombinant), glucose, glucose, insulin aspart, labetalol, ondansetron    Objective:     Vital Signs (Most Recent):  Temp: 97.9 °F (36.6 °C) (01/20/17 0701)  Pulse: 66 (01/20/17 0701)  Resp: 16 (01/20/17 0701)  BP: 123/63 (01/20/17 0701)  SpO2: 98 % (01/20/17 0824)  BP Location: Right arm    Vital Signs Range (Last 24H):  Temp:  [97.7 °F (36.5 °C)-98 °F (36.7 °C)]   Pulse:  [62-78]   Resp:  [13-48]   BP: (115-165)/()   SpO2:  [96 %-100 %]   BP Location: Right arm    Physical Exam   Constitutional: She is oriented to person, place, and time. She appears well-developed and well-nourished. No distress.   HENT:   Head: Normocephalic and atraumatic.   Eyes: EOM are normal.   Cardiovascular: Normal rate.    Pulmonary/Chest: Effort normal.   Musculoskeletal: She exhibits no edema.   Neurological: She is alert and oriented to  person, place, and time.   Skin: Skin is warm and dry.   Nursing note and vitals reviewed.    Neurological Exam:   LOC: alert and follows requests  Language: No aphasia  Speech: Dysarthria  Orientation: Person, Place, Time  EOM (CN III, IV, VI): Full/intact  Pupils (CN III, IV, VI): PERRL  Facial Movement (CN VII): symmetric facial expression  Hearing (CN VIII): intact bilaterally  Gag Reflex (CN IX, X): normal/symmetric  Shoulder/Neck (CN XI): SCM-Left: Normal ; SCM-Right: Normal ; Shoulder Shrug: Normal/Symetric  Motor*: Arm Left:  Normal (5/5), Leg Left:   Normal (5/5), Arm Right:   Normal (5/5), Leg Right:   Normal (5/5)  Cerebellar*: Normal limb  Sensation: meredith-hypoesthesia left  Tone: Arm-Left: normal; Leg-Left: normal; Arm-Right: normal; Leg-Right: normal      NIH Stroke Scale:    Level of Consciousness: 0 - alert  LOC Questions: 0 - answers both correctly  LOC Commands: 0 - performs both correctly  Best Gaze: 0 - normal  Visual: 0 - no visual loss  Facial Palsy: 0 - normal  Motor Left Arm: 0 - no drift  Motor Right Arm: 0 - no drift  Motor Left Le - no drift  Motor Right Le - no drift  Limb Ataxia: 0 - absent  Sensory: 1 - mild to moderate loss  Best Language: 0 - no aphasia  Dysarthria: 0 - normal articulation  Extinction and Inattention: 0 - no neglect  NIH Stroke Scale Total: 1      Laboratory:  Recent Results (from the past 24 hour(s))   POCT glucose    Collection Time: 17  1:58 PM   Result Value Ref Range    POCT Glucose 81 70 - 110 mg/dL   2D echo with color flow doppler and bubble contrast    Collection Time: 17  2:00 PM   Result Value Ref Range    EF 65 55 - 65    Diastolic Dysfunction No    POCT glucose    Collection Time: 17  7:15 PM   Result Value Ref Range    POCT Glucose 89 70 - 110 mg/dL   POCT glucose    Collection Time: 17  9:17 PM   Result Value Ref Range    POCT Glucose 211 (H) 70 - 110 mg/dL   Comprehensive metabolic panel    Collection Time: 17  2:12  AM   Result Value Ref Range    Sodium 139 136 - 145 mmol/L    Potassium 3.7 3.5 - 5.1 mmol/L    Chloride 106 95 - 110 mmol/L    CO2 20 (L) 23 - 29 mmol/L    Glucose 173 (H) 70 - 110 mg/dL    BUN, Bld 14 6 - 20 mg/dL    Creatinine 0.8 0.5 - 1.4 mg/dL    Calcium 8.5 (L) 8.7 - 10.5 mg/dL    Total Protein 6.1 6.0 - 8.4 g/dL    Albumin 3.1 (L) 3.5 - 5.2 g/dL    Total Bilirubin 0.2 0.1 - 1.0 mg/dL    Alkaline Phosphatase 58 55 - 135 U/L    AST 10 10 - 40 U/L    ALT 27 10 - 44 U/L    Anion Gap 13 8 - 16 mmol/L    eGFR if African American >60.0 >60 mL/min/1.73 m^2    eGFR if non African American >60.0 >60 mL/min/1.73 m^2   CBC auto differential    Collection Time: 01/20/17  2:12 AM   Result Value Ref Range    WBC 4.12 3.90 - 12.70 K/uL    RBC 4.00 4.00 - 5.40 M/uL    Hemoglobin 12.2 12.0 - 16.0 g/dL    Hematocrit 34.2 (L) 37.0 - 48.5 %    MCV 86 82 - 98 fL    MCH 30.5 27.0 - 31.0 pg    MCHC 35.7 32.0 - 36.0 %    RDW 12.8 11.5 - 14.5 %    Platelets 153 150 - 350 K/uL    MPV 11.3 9.2 - 12.9 fL    Gran # 2.4 1.8 - 7.7 K/uL    Lymph # 1.4 1.0 - 4.8 K/uL    Mono # 0.2 (L) 0.3 - 1.0 K/uL    Eos # 0.1 0.0 - 0.5 K/uL    Baso # 0.04 0.00 - 0.20 K/uL    Gran% 57.1 38.0 - 73.0 %    Lymph% 33.7 18.0 - 48.0 %    Mono% 5.3 4.0 - 15.0 %    Eosinophil% 2.4 0.0 - 8.0 %    Basophil% 1.0 0.0 - 1.9 %    Differential Method Automated    Magnesium    Collection Time: 01/20/17  2:12 AM   Result Value Ref Range    Magnesium 2.0 1.6 - 2.6 mg/dL   Phosphorus    Collection Time: 01/20/17  2:12 AM   Result Value Ref Range    Phosphorus 3.9 2.7 - 4.5 mg/dL     Diagnostic Results:  CTA Stroke Multi-phase 01/17/17:  No CT evidence of acute infarction or intracranial hemorrhage in this patient is status post tPA. No evidence of vascular high-grade stenosis, aneurysm, dissection or AVM. Mild calcific atherosclerosis at the origin of the left common carotid artery resulting in 20% stenosis by NASCET criteria. Stable hypodense lesion identified in the right  basal ganglia, may represent remote lacunar type infarct versus prominent perivascular space.    MRI Brain WO Contrast 01/17/17:  No acute intracranial pathology, specifically without evidence for infarct or hemorrhage. Prominent perivascular space within the right lentiform nucleus. Mild age advanced cerebral volume loss and chronic white matter changes as above.    Assessment/Plan:     51/F PMH HTN, HLD, DMII and tobacco abuse presented to OSH with L-sided sensory deficit, weakness and headache. S/p tPA at [facility] 01/17/17 at 1925. CTA demonstrated no LVO; MRI was performed and demonstrated no acute intracranial pathology.    * Acute right MCA stroke  - 51/F with L-sided numbness and weakness s/p tPA 01/17. MRI demonstrated no acute abnormalities.  - Antithrombotics for secondary stroke prevention: Aspirin 325mg PO daily  - Statins  Atorvastatin- 40 mg oral daily  - Aggressive risk factor modification: Hypertension and High Cholesterol  - Rehab Efforts: Physical Therapy, Occupational Therapy and Speech and Language Pathology  - Diagnostics: Ordered/Pending HgbA1C to assess blood glucose levels, MRI head without contrast to assess brain parenchyma, Trans-thoracic cardiac echo to assess cardiac function/status  - VTE Prophylaxis: None: Reason for No Pharmacological VTE Prophylaxis: Mechanical prophylaxis: Place SCDs, heparin 5000U subQ q8hr  - BP Parameters: SBP <180  - Nursing Orders: Neuro checks- Q1H, Swallowing evaluation by Nursing, Seizure precautions, Out of bed BID, Avoid Goel catheter, Pneumatic compression device, Stroke Education, Blood glucose target 100-130, Up with assistance    Preparing for step down.   Working on discharge planning     Left hemiparesis  - As under stroke. - teams recommending home health     Hyperlipidemia  - Risk factor for stroke.  - Continuing atorvastatin 40mg PO daily.    HTN (hypertension), benign  - Risk factor for stroke.  - Given s/p tPA, goal SBP < 180.  - Did not  require nicardipine gtt after admission.  - Will require close outpatient follow-up after discharge.    Tissue plasminogen activator (t-PA) administered at other facility within 24 hours prior to current admission  Stable for step down       OMAR Montes  [facility]  Department of Vascular Neurology   [facility]

## 2017-01-21 NOTE — PLAN OF CARE
Problem: Occupational Therapy Goal  Goal: Occupational Therapy Goal  Goals to be met by: 1/25     Patient will increase functional independence with ADLs by performing:    UE Dressing with Supervision. - not met  LE Dressing with Supervision. - not met  Grooming while standing with Supervision. - not met  Toileting from toilet with Supervision for hygiene and clothing management. - not met  Rolling to Bilateral with Modified [place]. - not met  Supine to sit with Modified [place]. - not met  Stand pivot transfers with Supervision. - not met   Outcome: Outcome(s) achieved Date Met:  01/21/17  No goals met.  Hospital discharge.

## 2017-01-21 NOTE — PLAN OF CARE
On Call CHAPIS consulted to arrange w/c van for pt. Pt is expecting DME delivery to bedside, DME has not been delivered yet.    [employer] Patient Delivery ([phone]) wheelchair van placed on WILL CALL to pt's address: [address] [place] [address].     When patient is ready, please call [facility] at [phone] and request next available pickup.    [facility] states they will most likely be late to  pt once called due to traffic in the area.    CHAPIS provided with pt's discharging address as [address] [place] [address], [place] [address].  CHAPIS attempting to reach [facility] on call; [facility] states he was unaware of this order. He requests SW fax to [phone]. BSC and cane order faxed. Equipment to be delivered to bedside.    Promise Ramos LMSW, CCM  On Call CHAPIS

## 2017-01-21 NOTE — SIGNIFICANT EVENT
6:35 PM  Discussion with Promise  on call   DME still not in room, she's contacted [facility] on call and it will be delivered to the room tonight.  She has re-faxed the information.    [facility] (case under patients name and date of birth) when patient is ready for ride.     Of note - patient will be going to stay with a family member and [facility] when called should be updated with the new address which is [address] [place] [address] in [place] [address].     6:38 PM  Contacted [facility] with new address for patient. Discussed with elina at [facility].     Discussed with nursing as well.  Patient updated.      Mercy Silva PA-C  Vascular Neurology  [phone]

## 2017-01-23 ENCOUNTER — TELEPHONE (OUTPATIENT)
Dept: NEUROSURGERY | Facility: HOSPITAL | Age: 52
End: 2017-01-23

## 2017-01-23 NOTE — TELEPHONE ENCOUNTER
Called number on file.  Spoke with patient.  Risk factors specific to patient for stroke discussed with teach back implemented.  Patient verbalized understanding of discharge instructions and medications.  Patient was asked about discharge appointments and follow up care.  Follow appointment scheduled for 2/20/2017 at 1120. Warning sings discussed with teach back discussion method implemented.  Notified to seek immediate medical help via 911 if new or worsening stroke symptoms occur.  Patient relayed no new questions or comments at this time.  Instructed to call [facility] with any further questions.

## 2017-01-25 NOTE — SUBJECTIVE & OBJECTIVE
NIH Stroke Scale:  Interval: 7 days or at discharge (whichever comes first)  Level of Consciousness: 0 - alert  LOC Questions: 0 - answers both correctly  LOC Commands: 0 - performs both correctly  Best Gaze: 0 - normal  Visual: 0 - no visual loss  Facial Palsy: 0 - normal  Motor Left Arm: 0 - no drift  Motor Right Arm: 0 - no drift  Motor Left Le - no drift  Motor Right Le - no drift  Limb Ataxia: 0 - absent  Sensory: 1 - mild to moderate loss (bilateral lower extremity numbness subjective )  Best Language: 0 - no aphasia  Dysarthria: 0 - normal articulation  Extinction and Inattention: 0 - no neglect  NIH Stroke Scale Total: 1  Modified [place] Scale:   Timeline: At discharge  Modified [place] Score: 1 - no significant disability

## 2017-01-25 NOTE — ASSESSMENT & PLAN NOTE
- As under stroke. - teams recommending home health but unable to have covered. Will rx outpatient, patient is arranging her transportation with her sons girlfriend

## 2017-01-25 NOTE — ASSESSMENT & PLAN NOTE
- 51/F with L-sided numbness and weakness s/p tPA 01/17. MRI demonstrated no acute abnormalities.  - Antithrombotics for secondary stroke prevention: Aspirin 325mg PO daily  - Statins  Atorvastatin  - Aggressive risk factor modification: Hypertension and High Cholesterol  - Rehab Efforts: Physical Therapy, Occupational Therapy and Speech and Language Pathology  - Diagnostics: none outstanding  - VTE Prophylaxis- Mechanical prophylaxis: Place SCDs, heparin 5000U subQ q8hr  - BP Parameters: SBP <180  - Nursing Orders: Neuro checks- Q4H, Swallowing evaluation by Nursing, Seizure precautions, Out of bed BID, Avoid Goel catheter, Pneumatic compression device, Stroke Education, Blood glucose target 100-130, Up with assistance

## 2017-01-25 NOTE — DISCHARGE SUMMARY
[facility]  Vascular Neurology  Comprehensive Stroke Center  Discharge Summary     Summary:     Admit Date: 1/17/2017 10:30 PM    Discharge Date and Time: 1/20/2017  9:10 PM    Attending Physician: Dr. Audie Fernandez      Discharge Provider: OMAR Montes    History of Present Illness: 52 y/o female who at 1730 started with sudden onset of numbness to left face and arm and speech difficulty. Patient was take to [facility] and due to persistent symptoms telemedicine call was done with  Dr Booth and with neg CT head recommendations were for IVtPA to be given. TPA given and patient transferred to [facility]. Patient c/o chest pain before transfer and SL nitro was given as well as nitro paste and then NS bolus given due to hypotension.  Upon arrival patient still with c/o left face and arm numbness and leg, weakness on left improved. C/o blurry vision. And had a headache.  Risk factors are HTN, CAD        Hospital Course (synopsis of major diagnoses, care, treatment, and services provided during the course of the hospital stay): 51/F PMH HTN, HLD, DMII and tobacco abuse presented to OSH with L-sided sensory deficit, weakness and headache. S/p tPA at [facility] 01/17/17 at 1925. CTA demonstrated no LVO; MRI was performed and demonstrated no acute intracranial pathology.  NIH on initial tele medicine 4- seen by Dr Hyman at 1/17/2017 at 6:48 PM     Complaint of nausea on day of discharge and reported it resolved after eating lunch.     The patient was boarding in ICU after tpa window and had been doing well overall. The patient with some concerns regarding social situation as she is not able to go home with her daughter who she reports doesn't want her until shes back to normal. She was considering going to her ex husbands house (which she reports is amicable and a safe place for her) but reconsidered as her initial recommendation was for home health. team SW informed her that her medicare will  not cover home health and I have written her an rx for outpatient therapy as recommended. She reported she will go live with her son and his girlfriend. She is tearful as she is on a fixed income and is worried she won't get better. The patients transportation was arranged for her and I discussed at length her medical update and arrangements going forward with her sons girlfriend at the patients request.     The patient was given rx for outpatient follow up, DME was arranged by  (cane and bedside commode) and given an rx for shower chair.     Inpatient acute stroke work up completed and patient is stable for discharge.  Discussed with my supervising md.         NIH Stroke Scale:  Interval: 7 days or at discharge (whichever comes first)  Level of Consciousness: 0 - alert  LOC Questions: 0 - answers both correctly  LOC Commands: 0 - performs both correctly  Best Gaze: 0 - normal  Visual: 0 - no visual loss  Facial Palsy: 0 - normal  Motor Left Arm: 0 - no drift  Motor Right Arm: 0 - no drift  Motor Left Le - no drift  Motor Right Le - no drift  Limb Ataxia: 0 - absent  Sensory: 1 - mild to moderate loss (bilateral lower extremity numbness subjective )  Best Language: 0 - no aphasia  Dysarthria: 0 - normal articulation  Extinction and Inattention: 0 - no neglect  NIH Stroke Scale Total: 1  Modified Sony Scale:   Timeline: At discharge  Modified Shelby Score: 1 - no significant disability          Assessment/Plan:     Interventions: IV t-PA    Complications: None    Research Candidate?:  No    Neurological deficit at discharge: None, subjective B LE numbness x while (not associated with admission complaint)     Disposition: Home or Self Care    Final Active Diagnoses:    Diagnosis Date Noted POA    PRINCIPAL PROBLEM:  Acute right MCA stroke [I63.511] 2017 Yes    Tachycardia [R00.0]  Unknown    Left hemiparesis [G81.94] 2017 Yes    Hyperlipidemia [E78.5] 2017 Yes    HTN  (hypertension), benign [I10] 01/18/2017 Yes    Tissue plasminogen activator (t-PA) administered at other facility within 24 hours prior to current admission [Z92.82] 01/18/2017 Not Applicable      Problems Resolved During this Admission:    Diagnosis Date Noted Date Resolved POA     * Acute right MCA stroke  - 51/F with L-sided numbness and weakness s/p tPA 01/17. MRI demonstrated no acute abnormalities.  - Antithrombotics for secondary stroke prevention: Aspirin 325mg PO daily  - Statins  Atorvastatin  - Aggressive risk factor modification: Hypertension and High Cholesterol  - Rehab Efforts: Physical Therapy, Occupational Therapy and Speech and Language Pathology  - Diagnostics: none outstanding  - VTE Prophylaxis- Mechanical prophylaxis: Place SCDs, heparin 5000U subQ q8hr  - BP Parameters: SBP <180  - Nursing Orders: Neuro checks- Q4H, Swallowing evaluation by Nursing, Seizure precautions, Out of bed BID, Avoid Goel catheter, Pneumatic compression device, Stroke Education, Blood glucose target 100-130, Up with assistance        Left hemiparesis  - As under stroke. - teams recommending home health but unable to have covered. Will rx outpatient, patient is arranging her transportation with her sons girlfriend     Hyperlipidemia  - Risk factor for stroke.  - Continuing atorvastatin  PO daily.    HTN (hypertension), benign  - Risk factor for stroke.  - Given s/p tPA, goal SBP < 180.  - Did not require nicardipine gtt after admission.  - Will require close outpatient follow-up after discharge.    Tissue plasminogen activator (t-PA) administered at other facility within 24 hours prior to current admission  Stable through tPA window       Recommendations:     Post-discharge complication risks: None, Falls (cane and bedside commode ordered through , shower chair rx given)    Stroke Education given to: patient and discussed medical dx, and plan with sons girlfriend who was called by the patient and the  "patient was comfortable with me reviewing info with her    Follow-up in Stroke Clinic in 4-6 weeks     Discharge Plan:  Antithrombotics: Aspirin 325mg  Statin: Atorvastatin 80mg  Aggresive risk factor modification:  Hypertension and High Cholesterol      Follow Up:  Follow-up Information     Follow up with Anuel Geronimo NP On 1/24/2017.    Specialty:  Family Medicine    Why:  Hospital follow up @ 9:15 am     Contact information:    [address] [place] [address]  [place] [address]  [phone]          Follow up with Yadiel Booth MD On 2/20/2017.    Specialty:  Neurology    Why:  Hospital follow up  @ 11:20    Contact information:    [address]  [facility] [address]  [phone]          Follow up with Anuel Geronimo NP. Schedule an appointment as soon as possible for a visit in 1 week.    Specialty:  Family Medicine    Contact information:    [address] [place] [address]  [place] [address]  [phone]          Patient Instructions:     3 IN 1 COMMODE FOR HOME USE   Order Specific Question Answer Comments   Type: Standard    Height: 5' 2" (1.575 m)    Weight: 54.3 kg (119 lb 11.4 oz)    Does patient have medical equipment at home? none    Length of need (1-99 months): 99    Vendor: [facility]   Expected Date of Delivery: 1/20/2017      CANE FOR HOME USE   Order Specific Question Answer Comments   Type of Cane: Straight    Height: 5' 2" (1.575 m)    Weight: 54.3 kg (119 lb 11.4 oz)    Does patient have medical equipment at home? none    Length of need (1-99 months): 99    Please check all that apply: Patient is unable to safely ambulate without equipment.    Please check all that apply: Patient's condition impairs ambulation.    Vendor: [facility]   Expected Date of Delivery: 1/20/2017      Referral to OutPatient  Physical therapy   Referral Priority: Routine Referral Type: Physical Medicine   Referral Reason: Specialty Services Required    Requested Specialty: Physical Therapy    Number of " Visits Requested: 1      Referral to Outpatient Occupational therapy   Referral Priority: Routine Referral Type: Occupational Therapy   Referral Reason: Specialty Services Required    Requested Specialty: Occupational Therapy    Number of Visits Requested: 1      Referral to OutPatient Speech Therapy   Referral Priority: Routine Referral Type: Speech Therapy   Referral Reason: Specialty Services Required    Requested Specialty: Speech Pathology    Number of Visits Requested: 1      Diet general     Activity as tolerated       Medications:  Reconciled Home Medications:   Discharge Medication List as of 1/20/2017  5:13 PM      START taking these medications    Details   aspirin 325 MG tablet Take 1 tablet (325 mg total) by mouth once daily., Starting 1/20/2017, Until Sat 1/20/18, Normal         CONTINUE these medications which have NOT CHANGED    Details   atorvastatin (LIPITOR) 80 MG tablet Take 80 mg by mouth once daily., Until Discontinued, Historical Med      clonazePAM (KLONOPIN) 1 MG tablet Take 1 mg by mouth 2 (two) times daily as needed for Anxiety., Until Discontinued, Historical Med      cyproheptadine (PERIACTIN) 4 mg tablet Take 4 mg by mouth 2 (two) times daily., Until Discontinued, Historical Med      lisinopril 10 MG tablet Take 10 mg by mouth once daily., Until Discontinued, Historical Med      multivitamin (ONE DAILY MULTIVITAMIN) per tablet Take 1 tablet by mouth once daily., Until Discontinued, Historical Med      omeprazole (PRILOSEC) 40 MG capsule Take 40 mg by mouth once daily., Until Discontinued, Historical Med         STOP taking these medications       clopidogrel (PLAVIX) 75 mg tablet Comments:   Reason for Stopping:         pantoprazole (PROTONIX) 40 MG tablet Comments:   Reason for Stopping:               OMAR Montes  [facility]  Department of Vascular Neurology   [facility]

## 2017-02-03 ENCOUNTER — TELEPHONE (OUTPATIENT)
Dept: NEUROSURGERY | Facility: HOSPITAL | Age: 52
End: 2017-02-03

## 2017-02-03 NOTE — TELEPHONE ENCOUNTER
"Received call from patient, Shun Tidwell.  Patient stated "I've been waiting on my shower chair to be delivered since I was discharged and it hasn't come yet."  Informed patient that she received paper prescription for durable medical equipment and that she could fill it at any distributor of her pleasing.  She asked "Well where is one near me?"  Given contact information for three local DME companies with instructions on how to inform them of paper prescriptions.  No new questions or concerns.  Instructed to call Stroke Center with any further questions or comments.   "

## 2017-02-06 ENCOUNTER — HOSPITAL ENCOUNTER (EMERGENCY)
Facility: HOSPITAL | Age: 52
Discharge: HOME | End: 2017-02-07
Attending: EMERGENCY MEDICINE
Payer: MEDICAID

## 2017-02-06 DIAGNOSIS — R20.0 LEFT LEG NUMBNESS: ICD-10-CM

## 2017-02-06 DIAGNOSIS — R29.898 LEFT ARM WEAKNESS: ICD-10-CM

## 2017-02-06 DIAGNOSIS — G45.9 TRANSIENT CEREBRAL ISCHEMIA, UNSPECIFIED TYPE: Primary | ICD-10-CM

## 2017-02-06 DIAGNOSIS — R20.0 LEFT FACIAL NUMBNESS: ICD-10-CM

## 2017-02-06 DIAGNOSIS — R20.0 NUMBNESS AND TINGLING OF LEFT SIDE OF FACE: ICD-10-CM

## 2017-02-06 DIAGNOSIS — R20.0 LEFT ARM NUMBNESS: ICD-10-CM

## 2017-02-06 DIAGNOSIS — R20.2 NUMBNESS AND TINGLING OF LEFT SIDE OF FACE: ICD-10-CM

## 2017-02-06 LAB
ABO + RH BLD: NORMAL
ALBUMIN SERPL BCP-MCNC: 3.6 G/DL
ALP SERPL-CCNC: 52 U/L
ALT SERPL W/O P-5'-P-CCNC: 32 U/L
ANION GAP SERPL CALC-SCNC: 7 MMOL/L
APTT BLDCRRT: <21 SEC
AST SERPL-CCNC: 21 U/L
BACTERIA #/AREA URNS AUTO: ABNORMAL /HPF
BASOPHILS # BLD AUTO: 0.03 K/UL
BASOPHILS NFR BLD: 0.5 %
BILIRUB SERPL-MCNC: 0.4 MG/DL
BILIRUB UR QL STRIP: NEGATIVE
BLD GP AB SCN CELLS X3 SERPL QL: NORMAL
BNP SERPL-MCNC: 15 PG/ML
BUN SERPL-MCNC: 17 MG/DL
CALCIUM SERPL-MCNC: 9.4 MG/DL
CHLORIDE SERPL-SCNC: 108 MMOL/L
CHOLEST/HDLC SERPL: 5.6 {RATIO}
CLARITY UR REFRACT.AUTO: ABNORMAL
CO2 SERPL-SCNC: 25 MMOL/L
COLOR UR AUTO: ABNORMAL
CREAT SERPL-MCNC: 1 MG/DL
DIFFERENTIAL METHOD: NORMAL
EOSINOPHIL # BLD AUTO: 0.1 K/UL
EOSINOPHIL NFR BLD: 1.1 %
ERYTHROCYTE [DISTWIDTH] IN BLOOD BY AUTOMATED COUNT: 12.8 %
EST. GFR  (AFRICAN AMERICAN): >60 ML/MIN/1.73 M^2
EST. GFR  (NON AFRICAN AMERICAN): >60 ML/MIN/1.73 M^2
GLUCOSE SERPL-MCNC: 91 MG/DL
GLUCOSE UR QL STRIP: NEGATIVE
HCT VFR BLD AUTO: 37.6 %
HDL/CHOLESTEROL RATIO: 17.8 %
HDLC SERPL-MCNC: 146 MG/DL
HDLC SERPL-MCNC: 26 MG/DL
HGB BLD-MCNC: 12.7 G/DL
HGB UR QL STRIP: ABNORMAL
INR PPP: 1
KETONES UR QL STRIP: NEGATIVE
LDLC SERPL CALC-MCNC: 81.4 MG/DL
LEUKOCYTE ESTERASE UR QL STRIP: ABNORMAL
LYMPHOCYTES # BLD AUTO: 1.8 K/UL
LYMPHOCYTES NFR BLD: 31.5 %
MCH RBC QN AUTO: 30.2 PG
MCHC RBC AUTO-ENTMCNC: 33.8 %
MCV RBC AUTO: 90 FL
MICROSCOPIC COMMENT: ABNORMAL
MONOCYTES # BLD AUTO: 0.4 K/UL
MONOCYTES NFR BLD: 7.6 %
NEUTROPHILS # BLD AUTO: 3.3 K/UL
NEUTROPHILS NFR BLD: 58.9 %
NITRITE UR QL STRIP: NEGATIVE
NON-SQ EPI CELLS #/AREA URNS AUTO: 3 /HPF
NONHDLC SERPL-MCNC: 120 MG/DL
PH UR STRIP: 6 [PH] (ref 5–8)
PLATELET # BLD AUTO: 226 K/UL
PMV BLD AUTO: 10.7 FL
POCT GLUCOSE: 92 MG/DL (ref 70–110)
POTASSIUM SERPL-SCNC: 4.7 MMOL/L
PROT SERPL-MCNC: 6.9 G/DL
PROT UR QL STRIP: NEGATIVE
PROTHROMBIN TIME: 10.7 SEC
RBC # BLD AUTO: 4.2 M/UL
RBC #/AREA URNS AUTO: 5 /HPF (ref 0–4)
SODIUM SERPL-SCNC: 140 MMOL/L
SP GR UR STRIP: 1 (ref 1–1.03)
SQUAMOUS #/AREA URNS AUTO: 26 /HPF
TRIGL SERPL-MCNC: 193 MG/DL
TROPONIN I SERPL DL<=0.01 NG/ML-MCNC: <0.006 NG/ML
TSH SERPL DL<=0.005 MIU/L-ACNC: 1.11 UIU/ML
URN SPEC COLLECT METH UR: ABNORMAL
UROBILINOGEN UR STRIP-ACNC: NEGATIVE EU/DL
WBC # BLD AUTO: 5.56 K/UL
WBC #/AREA URNS AUTO: >100 /HPF (ref 0–5)
WBC CLUMPS UR QL AUTO: ABNORMAL

## 2017-02-06 PROCEDURE — 85730 THROMBOPLASTIN TIME PARTIAL: CPT

## 2017-02-06 PROCEDURE — 85610 PROTHROMBIN TIME: CPT

## 2017-02-06 PROCEDURE — 83880 ASSAY OF NATRIURETIC PEPTIDE: CPT

## 2017-02-06 PROCEDURE — A9585 GADOBUTROL INJECTION: HCPCS | Performed by: EMERGENCY MEDICINE

## 2017-02-06 PROCEDURE — 93005 ELECTROCARDIOGRAM TRACING: CPT

## 2017-02-06 PROCEDURE — 99291 CRITICAL CARE FIRST HOUR: CPT | Mod: ,,, | Performed by: EMERGENCY MEDICINE

## 2017-02-06 PROCEDURE — 84443 ASSAY THYROID STIM HORMONE: CPT

## 2017-02-06 PROCEDURE — 86850 RBC ANTIBODY SCREEN: CPT

## 2017-02-06 PROCEDURE — 87086 URINE CULTURE/COLONY COUNT: CPT

## 2017-02-06 PROCEDURE — 80053 COMPREHEN METABOLIC PANEL: CPT

## 2017-02-06 PROCEDURE — 80061 LIPID PANEL: CPT

## 2017-02-06 PROCEDURE — 86900 BLOOD TYPING SEROLOGIC ABO: CPT

## 2017-02-06 PROCEDURE — 94761 N-INVAS EAR/PLS OXIMETRY MLT: CPT

## 2017-02-06 PROCEDURE — 87088 URINE BACTERIA CULTURE: CPT

## 2017-02-06 PROCEDURE — 87186 SC STD MICRODIL/AGAR DIL: CPT

## 2017-02-06 PROCEDURE — 99285 EMERGENCY DEPT VISIT HI MDM: CPT | Mod: 25

## 2017-02-06 PROCEDURE — 84484 ASSAY OF TROPONIN QUANT: CPT

## 2017-02-06 PROCEDURE — 85025 COMPLETE CBC W/AUTO DIFF WBC: CPT

## 2017-02-06 PROCEDURE — 81001 URINALYSIS AUTO W/SCOPE: CPT

## 2017-02-06 PROCEDURE — 25500020 PHARM REV CODE 255: Performed by: EMERGENCY MEDICINE

## 2017-02-06 PROCEDURE — 82962 GLUCOSE BLOOD TEST: CPT

## 2017-02-06 PROCEDURE — 87077 CULTURE AEROBIC IDENTIFY: CPT

## 2017-02-06 PROCEDURE — 93010 ELECTROCARDIOGRAM REPORT: CPT | Mod: ,,, | Performed by: INTERNAL MEDICINE

## 2017-02-06 RX ORDER — GADOBUTROL 604.72 MG/ML
6 INJECTION INTRAVENOUS
Status: COMPLETED | OUTPATIENT
Start: 2017-02-06 | End: 2017-02-06

## 2017-02-06 RX ADMIN — GADOBUTROL 6 ML: 604.72 INJECTION INTRAVENOUS at 10:02

## 2017-02-06 NOTE — ED AVS SNAPSHOT
[facility]  [address] [facility] [address]               Shun Tidwell   2017  7:28 PM   ED    Description:  Female : 1965   Department:  [facility]           Your Care was Coordinated By:     Provider Role From To    Stu Infante MD Attending Provider 17 [phone] --      Reason for Visit     Cerebrovascular Accident           Diagnoses this Visit        Comments    Transient cerebral ischemia, unspecified type    -  Primary     Left arm weakness         Left facial numbness         Left arm numbness         Left leg numbness         Numbness and tingling of left side of face           ED Disposition     ED Disposition Condition Comment    Discharge             To Do List           Follow-up Information     Follow up with [facility] In 1 day.    Specialty:  Neurology    Why:  Call for an appointment to establish follow up with the [facility] team.    Contact information:    [address] [facility] [id number] [phone]    Additional information:    7th Floor        Follow up with Anuel Geronimo NP In 1 day.    Specialty:  Family Medicine    Why:  if your symptoms don't improve    Contact information:    [address] [address]  [phone]          Follow up with [facility].    Specialty:  Emergency Medicine    Why:  As needed, If symptoms worsen    Contact information:    [address] [facility] [id number] [phone]      [facility]     [facility] Nurse Care Line -  Assistance  Registered nurses in the [facility] provide clinical advisement, health education, appointment booking, and other advisory services.  Call for this free service at [phone].             Medications           Message regarding Medications     Verify the changes and/or additions to your medication regime listed below are  the same as discussed with your clinician today.  If any of these changes or additions are incorrect, please notify your healthcare provider.        These medications were administered today        Dose Freq    gadobutrol 6 mL 6 mL IMG once as needed    Sig: Inject 6 mLs into the vein ONCE PRN for contrast.    Class: Normal    Route: Intravenous           Verify that the below list of medications is an accurate representation of the medications you are currently taking.  If none reported, the list may be blank. If incorrect, please contact your healthcare provider. Carry this list with you in case of emergency.           Current Medications     aspirin 325 MG tablet Take 1 tablet (325 mg total) by mouth once daily.    atorvastatin (LIPITOR) 80 MG tablet Take 80 mg by mouth once daily.    clonazePAM (KLONOPIN) 1 MG tablet Take 1 mg by mouth 2 (two) times daily as needed for Anxiety.    cyproheptadine (PERIACTIN) 4 mg tablet Take 4 mg by mouth 2 (two) times daily.    lisinopril 10 MG tablet Take 10 mg by mouth once daily.    multivitamin (ONE DAILY MULTIVITAMIN) per tablet Take 1 tablet by mouth once daily.    omeprazole (PRILOSEC) 40 MG capsule Take 40 mg by mouth once daily.           Clinical Reference Information           Your Vitals Were     BP Pulse Temp Resp Weight Last Period    102/69 54 98.3 °F (36.8 °C) 19 54.4 kg (120 lb) (Approximate)    SpO2 BMI             98% 21.95 kg/m2         Allergies as of 2/7/2017        Reactions    Bentyl [Dicyclomine]     Clindamycin     Macrobid [Nitrofurantoin Monohyd/m-cryst]     Percocet [Oxycodone-acetaminophen]     Thorazine [Chlorpromazine]     Topamax [Topiramate]     Tramadol       Immunizations Administered on Date of Encounter - 2/7/2017     None      ED Micro, Lab, POCT     Start Ordered       Status Ordering Provider    02/06/17 1953 02/06/17 1953  POCT glucose  Once      Final result     02/06/17 1923 02/06/17 1922  Urinalysis  STAT      Final result      02/06/17 1923 02/06/17 1922  Urine culture **CANNOT BE ORDERED STAT**  Once      In process     02/06/17 1922 02/06/17 1921  CBC W/ AUTO DIFFERENTIAL  Once      Final result     02/06/17 1922 02/06/17 1921  Comprehensive metabolic panel  Once      Final result     02/06/17 1922 02/06/17 1921  Protime-INR  Once      Final result     02/06/17 1922 02/06/17 1921  TSH  Once      Final result     02/06/17 1922 02/06/17 1921  LDL - Lipid Panel  STAT      Final result     02/06/17 1922 02/06/17 1921  Troponin I  Once      Final result     02/06/17 1922 02/06/17 1921  APTT  Once      Final result     02/06/17 1922 02/06/17 1921  B-Type natriuretic peptide  Once      Final result     02/06/17 1922 02/06/17 1922  Urinalysis Microscopic  Once      Final result       ED Imaging Orders     Start Ordered       Status Ordering Provider    02/06/17 2139 02/06/17 2010  MRI Cervical Spine W WO Cont  1 time imaging      Preliminary result     02/06/17 2051 02/06/17 2053  MRI Brain W WO Contrast  1 time imaging      Preliminary result     02/06/17 2010 02/06/17 2010    1 time imaging,   Status:  Canceled      Canceled     02/06/17 2010 02/06/17 2010    1 time imaging,   Status:  Canceled      Canceled     02/06/17 1922 02/06/17 1921  X-Ray Chest AP Portable  1 time imaging      Final result     02/06/17 1921 02/06/17 1921  CT Head Without Contrast  1 time imaging      Final result         Discharge Instructions         Continue taking Aspirin 325mg daily.      What is a TIA?  A TIA (Transient Ischemic Attack) is an early warning that a stroke (also called a brain attack) is coming. A TIA is a temporary stroke. It causes no lasting damage. But the effects of a stroke, if it happens, can be very serious and lasting. If you think you are having symptoms of a TIA or stroke--even if they dont last--get medical help right away.     If you have any symptoms of a TIA or stroke, call 911 and your doctor as soon as possible.     Symptoms of TIA  and stroke  Symptoms may come on suddenly and last for a few seconds or a few hours. You may have symptoms only once. Or they may come and go for days. If you notice any of the following symptoms, dont wait. Call 911 or emergency services right away.  · Weakness, numbness, tingling, or loss of feeling in your face, arm, or leg.  · Trouble seeing in one or both eyes; double vision.  · Slurred speech, trouble talking, or problems understanding others when they speak  · Sudden, severe headache  · Dizziness or a feeling of spinning  · Loss of balance or falling  · Blackouts  Date Last Reviewed: 6/8/2015  © 5142-5571 [employer]. [address] [place] [address], [place] [address]. All rights reserved. This information is not intended as a substitute for professional medical care. Always follow your healthcare professional's instructions.        Discharge Instructions for Transient Ischemic Attack (TIA)  You have been diagnosed with a transient ischemic attack (TIA). You can think of a TIA as a temporary or mini-stroke. Blood temporarily could not reach part of your brain. Unlike a stroke, TIAs usually cause no lasting damage. If you think you are having symptoms of a TIA or stroke, get medical help right away--even if the symptoms go away.   Prevention  · Take your medications exactly as directed. Dont skip doses.  · Learn to take your blood pressure. Keep a log for your doctor.  · Change your diet if your doctor tells you to. Your doctor may suggest that you cut back on salt. If so, here are some tips:  ¨ Limit canned, dried, packaged, and fast foods.  ¨ Dont add salt to your food at the table.  ¨ Season foods with herbs instead of salt when you cook.  · Maintain a healthy weight. Get help to lose any extra pounds.  · Begin an exercise program. Ask your doctor how to get started. You can benefit from simple activities, such as walking or gardening.  · Limit your alcohol intake to no more than 2 drinks a  day.  · Know your cholesterol level. Follow your doctors advice about how to keep cholesterol under control.  · If you are a smoker, you need to quit now. Enroll in a stop-smoking program to improve your chances of success. Ask your doctor about medications or other methods to help you quit.  · Your health care provider will give you information on dietary changes that you may need to make, based on your situation. Your provider may recommend that you see a registered dietitian for help with diet changes. Changes may include:  ¨ Reducing fat and cholesterol intake  ¨ Reducing sodium (salt) intake, especially if you have high blood pressure  ¨ Increasing your intake of fresh vegetables and fruits  ¨ Eating lean proteins, such as fish, poultry, and legumes (beans and peas) and eating less red meat and processed meats  ¨ Using low-fat dairy products  ¨ Using vegetable and nut oils in limited amounts  ¨ Limiting sweets and processed foods such as chips, cookies, and baked goods  · If you are overweight, your health care provider will work with you to lose weight and lower your body mass index (BMI) to a normal or near-normal level. Making diet changes and increasing physical activity can help.  · Begin an exercise program. Ask your doctor how to get started and how much activity you should try to get on a daily or weekly basis. You can benefit from simple activities such as walking or gardening.  · Learn stress-management techniques to help you deal with stress in your home and work life.  Follow-up care  · Some medications require blood tests to check for progress or problems. Keep follow-up appointments for any blood tests ordered by your doctors.     When to seek medical care  Call 911 right away if you have any of the following:  · Weakness, tingling, or loss of feeling on one side of your face or body  · Sudden double vision, or trouble seeing in one or both eyes  · Sudden trouble talking, or slurring your  speech  · Trouble understanding others  · Sudden, severe headache  · Dizziness, loss of balance, or a spinning feeling, a sense of falling  · Blackouts or seizures   Date Last Reviewed: 6/13/2015  © 2908-3151 [employer]. [address] [place] [address], [place] [address]. All rights reserved. This information is not intended as a substitute for professional medical care. Always follow your healthcare professional's instructions.          Your Scheduled Appointments     Feb 20, 2017 11:20 AM CST   New Patient with Yadiel Booth MD   [facility] ([facility] )    [address]  [place] [address]   [phone]            Jun 08, 2017  1:00 PM CDT   New Patient with AIMEE, PMR RESIDENT   [facility] ([facility])    [address], [facility]  [facility] [address]   [phone]              [facility] Sign-Up     Activating your [facility] account is as easy as 1-2-3!     1) Visit [email or web address], select Sign Up Now, enter this activation code and your date of birth, then select Next.  [email or web address]  Expires: 3/6/2017  3:36 PM      2) Create a username and password to use when you visit [facility] in the future and select a security question in case you lose your password and select Next.    3) Enter your e-mail address and click Sign Up!    Additional Information  If you have questions, please e-mail [email or web address] or call [phone] to talk to our [facility] staff. Remember, [facility] is NOT to be used for urgent needs. For medical emergencies, dial 911.         Smoking Cessation     If you would like to quit smoking:   You may be eligible for free services if you are a [place] resident and started smoking cigarettes before September 1, 1988.  Call the Smoking Cessation Trust (SCT) toll free at (789) 622-0370 or (665) 330-6368.   Call 0-800-QUIT-NOW if you do not meet the above criteria.             [facility]  complies with applicable Federal civil rights laws and does not discriminate on the basis of race, color, national origin, age, disability, or sex.        Language Assistance Services     ATTENTION: Language assistance services are available, free of charge. Please call [phone].      ATENCIÓN: Si arnaudla radha, tiene a hook disposición servicios gratuitos de asistencia lingüística. Llame al [phone].     CHÚ Ý: N?u b?n nói Ti?ng Vi?t, có các d?ch v? h? tr? ngôn ng? mi?n phí dành cho b?n. G?i s? [phone].        Medications Administered     gadobutrol 6 mL                    Administrations This Visit        Admin Date Action                   gadobutrol 6 mL 02/06/2017 Given                  Administrations This Visit     gadobutrol 6 mL     Admin Date Action Dose Route Administered By             02/06/2017 Given 6 mL Intravenous Radha Dominguez, RT

## 2017-02-07 VITALS
HEART RATE: 69 BPM | TEMPERATURE: 98 F | RESPIRATION RATE: 16 BRPM | DIASTOLIC BLOOD PRESSURE: 85 MMHG | SYSTOLIC BLOOD PRESSURE: 142 MMHG | WEIGHT: 120 LBS | BODY MASS INDEX: 21.95 KG/M2 | OXYGEN SATURATION: 98 %

## 2017-02-07 NOTE — ED NOTES
Patient requested that her family (Dwight and Alesha) be contacted to update them on her status and location. Patient's family has been informed per patient's request.

## 2017-02-07 NOTE — ED PROVIDER NOTES
Encounter Date: 2/6/2017    SCRIBE #1 NOTE: I, Simi Veloz, am scribing for, and in the presence of, Dr. Infante.       History     Chief Complaint   Patient presents with    Cerebrovascular Accident     left sided weakness that began around 430pm      Review of patient's allergies indicates:   Allergen Reactions    Bentyl [dicyclomine]     Clindamycin     Macrobid [nitrofurantoin monohyd/m-cryst]     Percocet [oxycodone-acetaminophen]     Thorazine [chlorpromazine]     Topamax [topiramate]     Tramadol    Time seen by provider: 7:18  PM    This is a 51 y.o. female with history of HTN, HLD, CAD, anxiety, depression who presents to the ER by [employer] [facility] for concern of stroke. She states that she has a history of stroke and has had residual left-sided weakness and diminished sensation since then.  She states that at 4:30 PM today, she developed worsening left arm, leg and face heaviness and numbness all in the same areas that were previously affected by her prior stroke. EMS was called. They picked her up and she was flown directly to [facility] for further evaluation.    Her prior records from the admission 1/17/17 were reviewed.    The history is provided by the patient and the EMS personnel.     Past Medical History   Diagnosis Date    Anxiety     CAD (coronary artery disease), native coronary artery     Current smoker     Depression     HTN (hypertension), benign     Hyperlipidemia     Stroke 01/17/2017     No past medical history pertinent negatives.  Past Surgical History   Procedure Laterality Date    Femoral stents      Abdominal aortic stent      Cholecystectomy      Hysterectomy      Bladder suspension      Hernia repair       No family history on file.  Social History   Substance Use Topics    Smoking status: Current Every Day Smoker     Packs/day: 2.00     Types: Cigarettes    Smokeless tobacco: Never Used    Alcohol use None     Review of Systems   Unable to  perform ROS: Acuity of condition   Neurological: Positive for weakness (left arm) and numbness (left arm). Negative for facial asymmetry and speech difficulty.        Also diminished sensation in left face and left leg       Physical Exam   Initial Vitals   BP Pulse Resp Temp SpO2   02/06/17 1925 02/06/17 1925 02/06/17 1925 02/06/17 1925 02/06/17 1925   100/67 57 19 98.3 °F (36.8 °C) 98 %     Physical Exam    Nursing note and vitals reviewed.    Gen/Constitutional: Interactive. No acute distress  Head: Normocephalic, Atraumatic  Neck: supple, no masses or LAD  Eyes: PERRLA, conjunctiva clear  Ears, Nose and Throat: No rhinorrhea or stridor.  Cardiac: Reg Rhythm, No murmur  Pulmonary: CTA Bilat, no wheezes, rhonchi, rales.  GI: Abdomen soft, non-tender, non-distended; no rebound or guarding  : No CVA tenderness.  Musculoskeletal: Extremities warm, well perfused, no erythema, no edema  Skin: No rashes  Neuro: Alert and Oriented x 3; Reports mild diminished sensation in left arm and left leg and left face with normal sensation in all other extremities. No drift in left upper extremity. No drift in right upper extremity, right lower extremity, left lower extremity, no face droop, no dysarthria.  Normal finger to nose and heel to shin.  No ataxia.  Psych: Normal affect      ED Course   Procedures   Critical Care Procedure Note:  Direct patient critical care time: 20 minutes  Additional history critical care time:10 minutes  Ordering / reviewing labs and studies: critical care time:10 minutes  Documentation critical care time: 10 minutes  Consulting other physicians critical care time: 10 minutes  Total critical care time (exclusive of procedural time) : 60 minutes   Reason for Critical Care: Acute neurologic deficit    Labs Reviewed   COMPREHENSIVE METABOLIC PANEL - Abnormal; Notable for the following:        Result Value    Alkaline Phosphatase 52 (*)     Anion Gap 7 (*)     All other components within normal limits     Narrative:     one lavender tube shared   LIPID PANEL - Abnormal; Notable for the following:     Triglycerides 193 (*)     HDL 26 (*)     HDL/Chol Ratio 17.8 (*)     Total Cholesterol/HDL Ratio 5.6 (*)     All other components within normal limits    Narrative:     one lavender tube shared   URINALYSIS - Abnormal; Notable for the following:     Appearance, UA Hazy (*)     Occult Blood UA Trace (*)     Leukocytes, UA 3+ (*)     All other components within normal limits   URINALYSIS MICROSCOPIC - Abnormal; Notable for the following:     RBC, UA 5 (*)     WBC, UA >100 (*)     WBC Clumps, UA Occasional (*)     Bacteria, UA Moderate (*)     Non-Squam Epith 3 (*)     All other components within normal limits   CULTURE, URINE   CBC W/ AUTO DIFFERENTIAL    Narrative:     one lavender tube shared   PROTIME-INR    Narrative:     one lavender tube shared   TSH    Narrative:     one lavender tube shared   TROPONIN I    Narrative:     one lavender tube shared   APTT    Narrative:     one lavender tube shared   B-TYPE NATRIURETIC PEPTIDE    Narrative:     one lavender tube shared   TYPE & SCREEN   POCT GLUCOSE     Imaging Results         MRI Cervical Spine W WO Cont (In process)     Procedure changed from MRI Cervical Spine Without Contrast            MRI Brain W WO Contrast (In process)         X-Ray Chest AP Portable (Final result) Result time:  02/06/17 20:13:52    Final result by Dwight Cruz MD (02/06/17 20:13:52)    Impression:        No acute findings in the chest.        Electronically signed by: DWIGHT CRUZ MD  Date:     02/06/17  Time:    20:13     Narrative:    Chest AP portable    Indication:Stroke.    Comparison:January 17, 2017.    Findings:         Heart and lungs unchanged when allowing for differences in technique and positioning.            CT Head Without Contrast (Final result) Result time:  02/06/17 19:55:46    Final result by Chandra Henderson MD (02/06/17 19:55:46)    Impression:     No  acute intracranial abnormality.  ______________________________________     Electronically signed by resident: SURJIT BROWN MD  Date:     02/06/17  Time:    19:33            As the supervising and teaching physician, I personally reviewed the images and resident's interpretation and I agree with the findings.          Electronically signed by: AFTAB MENDEZ MD  Date:     02/06/17  Time:    19:55     Narrative:    Procedure comment: CT examination of the head was performed from the skull base through the vertex without the use of intravenous contrast using 5-mm axial images.    Comparison: MR brain 01/18/2017, CTA 1/2017    Findings:  There is no evidence of acute or recent major vascular territory cerebral infarction, parenchymal hemorrhage, or intra-axial mass.  Low-attenuation area in the RIGHT basal ganglia reported on MRI as prominent perivascular space.  There are no extra-axial masses or abnormal fluid collections.  The ventricular system is within normal limits of size for age and shows no distortion by mass-effect or evidence of hydrocephalus.  There is generalized cerebral volume loss. There is no fracture or destructive osseous lesion.      The extracranial soft tissues are unremarkable.  The visualized paranasal sinuses and mastoid air cells are clear.  The orbits and intraorbital contents are within normal limits.                EKG Readings: (Independently Interpreted)   EKG: Sinus abhilash at 52, no ANA's or STD's, non-specific twave pattern, no STEMI       Imaging Results         MRI Cervical Spine W WO Cont (Final result) Result time:  02/07/17 00:41:54    Procedure changed from MRI Cervical Spine Without Contrast        Final result by Surjit Brown MD (02/07/17 00:41:54)    Impression:      No acute intracranial process, specifically no infarcts or hemorrhage.    Unremarkable MRI of the cervical spine specifically without evidence for cord signal normality to suggest edema or abnormal  intrathecal enhancement.    Evaluation of the cervical spine is motion limited.  ______________________________________     Electronically signed by resident: SURJIT BROWN MD  Date:     02/06/17  Time:    23:57            As the supervising and teaching physician, I personally reviewed the images and resident's interpretation and I agree with the findings.          Electronically signed by: AFTAB MENDEZ MD  Date:     02/07/17  Time:    00:41     Narrative:    HISTORY: left arm weakness    TECHNIQUE:  Multiplanar multisequence images of the brain and cervical spine with and without 6 ml Gadavist intravenous contrast.    COMPARISON: CT head 02/06/2017, MR brain 01/18/2017    FINDINGS:   Brain and intracranial structures:    There is no mass lesion, hemorrhage, or acute infarct. Ventricles and sulci are normal in configuration and size. Minimal T2/FLAIR hyperintensities are scattered in the periventricular and subcortical white matter, nonspecific but likely related to chronic microvascular ischemic disease.  There are normal vascular flow voids.  Prominent perivascular space in the RIGHT basal ganglia similar to prior.    There is no evidence of abnormal enhancement following intravenous contrast administration.    Skull:  Normal.    Scalp: Normal.    Orbits and face (included portions): Normal.    Paranasal sinuses and mastoid air cells (included portions): Normal.    CERVICAL SPINE:  Evaluation of the cervical spine is limited by patient motion.  Findings: There is straightening of the normal cervical lordotic curvature which may in part be related to positioning. The cervical vertebral body heights, contours, and bone marrow signal is within normal limits. No evidence for acute fracture or subluxation of the cervical spine. The craniocervical junction is within normal limits. The cerebellar tonsils are appropriately located.    The cervical spinal cord is normal in signal and contour.  No abnormal intrathecal  enhancement.  No significant central canal or neural frontal stenosis throughout the cervical spine.    The paraspinal soft tissues are within normal limits.    There is a dominant right vertebral artery.            MRI Brain W WO Contrast (Final result) Result time:  02/07/17 00:39:53    Final result by Surjit Henderson MD (02/07/17 00:39:53)    Impression:      No acute intracranial process, specifically no infarcts or hemorrhage.    Unremarkable MRI of the cervical spine specifically without evidence for cord signal normality to suggest edema or abnormal intrathecal enhancement.    Evaluation of the cervical spine is motion limited.  ______________________________________     Electronically signed by resident: SURJIT HENDERSON MD  Date:     02/06/17  Time:    23:57            As the supervising and teaching physician, I personally reviewed the images and resident's interpretation and I agree with the findings.          Electronically signed by: AFTAB MENDEZ MD  Date:     02/07/17  Time:    00:39     Narrative:    HISTORY: left arm weakness    TECHNIQUE:  Multiplanar multisequence images of the brain and cervical spine with and without 6 ml Gadavist intravenous contrast.    COMPARISON: CT head 02/06/2017, MR brain 01/18/2017    FINDINGS:   Brain and intracranial structures:    There is no mass lesion, hemorrhage, or acute infarct. Ventricles and sulci are normal in configuration and size. Minimal T2/FLAIR hyperintensities are scattered in the periventricular and subcortical white matter, nonspecific but likely related to chronic microvascular ischemic disease.  There are normal vascular flow voids.  Prominent perivascular space in the RIGHT basal ganglia similar to prior.    There is no evidence of abnormal enhancement following intravenous contrast administration.    Skull:  Normal.    Scalp: Normal.    Orbits and face (included portions): Normal.    Paranasal sinuses and mastoid air cells (included portions):  Normal.    CERVICAL SPINE:  Evaluation of the cervical spine is limited by patient motion.  Findings: There is straightening of the normal cervical lordotic curvature which may in part be related to positioning. The cervical vertebral body heights, contours, and bone marrow signal is within normal limits. No evidence for acute fracture or subluxation of the cervical spine. The craniocervical junction is within normal limits. The cerebellar tonsils are appropriately located.    The cervical spinal cord is normal in signal and contour.  No abnormal intrathecal enhancement.  No significant central canal or neural frontal stenosis throughout the cervical spine.    The paraspinal soft tissues are within normal limits.    There is a dominant right vertebral artery.            X-Ray Chest AP Portable (Final result) Result time:  02/06/17 20:13:52    Final result by Zohaib Phan MD (02/06/17 20:13:52)    Impression:        No acute findings in the chest.        Electronically signed by: ZOHAIB PHAN MD  Date:     02/06/17  Time:    20:13     Narrative:    Chest AP portable    Indication:Stroke.    Comparison:January 17, 2017.    Findings:         Heart and lungs unchanged when allowing for differences in technique and positioning.            CT Head Without Contrast (Final result) Result time:  02/06/17 19:55:46    Final result by Surjit Henderson MD (02/06/17 19:55:46)    Impression:     No acute intracranial abnormality.  ______________________________________     Electronically signed by resident: SURJIT HENDERSON MD  Date:     02/06/17  Time:    19:33            As the supervising and teaching physician, I personally reviewed the images and resident's interpretation and I agree with the findings.          Electronically signed by: AFTAB MENDEZ MD  Date:     02/06/17  Time:    19:55     Narrative:    Procedure comment: CT examination of the head was performed from the skull base through the vertex without the  use of intravenous contrast using 5-mm axial images.    Comparison: MR brain 01/18/2017, CTA 1/2017    Findings:  There is no evidence of acute or recent major vascular territory cerebral infarction, parenchymal hemorrhage, or intra-axial mass.  Low-attenuation area in the RIGHT basal ganglia reported on MRI as prominent perivascular space.  There are no extra-axial masses or abnormal fluid collections.  The ventricular system is within normal limits of size for age and shows no distortion by mass-effect or evidence of hydrocephalus.  There is generalized cerebral volume loss. There is no fracture or destructive osseous lesion.      The extracranial soft tissues are unremarkable.  The visualized paranasal sinuses and mastoid air cells are clear.  The orbits and intraorbital contents are within normal limits.                 Medical Decision Making:   History:   Old Medical Records: I decided to obtain old medical records.  Old Records Summarized: records from previous admission(s).  Independently Interpreted Test(s):   I have ordered and independently interpreted EKG Reading(s) - see prior notes  Other:   I have discussed this case with another health care provider.  Clinical Tests:  Labs Test(s) were ordered and reviewed by me.  Radiological study(s) were ordered and reviewed by me.  Medical test(s) were ordered and reviewed by me.    Pt presents with onset of left face numbness, left arm weakness and diminished sensation and left leg weakness and diminished sensation around 4:30 PM. Similar symptoms to when she was evaluated and admitted to [facility] neurology in Jan for concern for stroke.  During that admission she got TPA, had MRI brain that was negative for ischemia, CTA that was negative for significant stenosis and unremarkable ECHO. My primary concern was evaluation for stroke. Vascular neurology was consulted immediately on arrival and were at the bedside when pt arrived. Pt went immediately to head CT scan.  Vascular neurology discussing with staff whether pt is TPA candidate. NIH Stroke Scale Score of 1. Also considered UTI, sepsis, recrudescence syndrome, TIA, pneumonia, cervical radiculopathy, Aortic dissection, MI/ACS among other diagnoses, but ultimately felt all were unlikely based on H and P  And w/u. Pt's previous stroke was recent on January 17, 2017.  I reviewed the records from this admission.  The patient also reports that she had a recent heart cath which demonstrated no blockages.      8:10 PM  Vascular neurology evaluated the pt at the bedside and did not feel she was having a stroke and did not feel she warranted TPA or admission to their service. They felt she was possibly having conversion d/o.  They are signing off at this time. Given the fact that she is still reporting weakness and numbness in her left arm, I feel she warranted MRI brain and MRI C-spine. These were ordered and will reevaluate.    U/A consistent with dirty catch.  Patient denies dysuria.  Will await culture before deciding if she needs treatment for UTI.  Did not feel she needed empiric treatment.    The event today could be explained by either TIA vs conversion disorder.      MRI brain and C-spine pending.      MRI brain neg for stroke and C-spine negative for nerve compression or cord compression.     The patient was re-examined and had no dysarthria, face droop, pronator drift in arms or legs, dysmetria.  She was ambulated and had no ataxia.  She still did report mild diminished sensation of left face, arm and leg and subjective weakness in left arm and leg.      I feel she is stable for discharge with continued ASA 325mg and outpatient f/u with neurology.    The patient was given strict return precautions and follow up instructions and expressed understanding of these.  The patient felt comfortable with the plan for discharge and I did as well.  Stable for DC.                Scribe Attestation:   Scribe #1: I performed the above  scribed service and the documentation accurately describes the services I performed. I attest to the accuracy of the note.    Attending Attestation:           Physician Attestation for Scribe:  Physician Attestation Statement for Scribe #1: I, Dr. Infante, reviewed documentation, as scribed by Simi Veloz in my presence, and it is both accurate and complete.                 ED Course     Clinical Impression:   The primary encounter diagnosis was Transient cerebral ischemia, unspecified type. Diagnoses of Left arm weakness, Left facial numbness, Left arm numbness, Left leg numbness, and Numbness and tingling of left side of face were also pertinent to this visit.          Stu Infante MD  02/07/17 0256

## 2017-02-07 NOTE — ED NOTES
This nurse spoke with neuro team and they stated it was ok for patient to travel to MRI without telemetry monitoring.

## 2017-02-07 NOTE — PLAN OF CARE
Sent by [employer] transport for possible stroke.  Now, being discharged with no family and no way home.  Service cab will provide transport to her daughters house [address]  RACHEL Ospina for 152.00

## 2017-02-07 NOTE — ASSESSMENT & PLAN NOTE
Patient at baseline from last admission  Discharged from stroke service with mild-moderate sensory loss

## 2017-02-07 NOTE — ASSESSMENT & PLAN NOTE
Patient at baseline from last admission  Patient had no drift in LUE and LLE on discharge  Today, patient still has no drift on exam. Some flexor weakness in LLE and extensor weakness in LUE. However, appears to be at her baseline given that she required DME on last admission and has outpatient therapy.

## 2017-02-07 NOTE — ED TRIAGE NOTES
"Shun Tidwell, a 51 y.o. female presents to the ED complaining of left arm weakness stating "I can't lift anything with it." pt complaining of slight left leg weakness. Pt also complaining of numbness to left jaw, left arm, and left leg. Pt states she feels like she is slurring a little. Pt complaining of mid chest pain 5/10 on pain scale. Pt denies SOB, n/v/d, fever. Symptoms started at 4:30pm      Chief Complaint   Patient presents with    Cerebrovascular Accident     left sided weakness that began around 430pm      Review of patient's allergies indicates:   Allergen Reactions    Bentyl [dicyclomine]     Clindamycin     Macrobid [nitrofurantoin monohyd/m-cryst]     Percocet [oxycodone-acetaminophen]     Thorazine [chlorpromazine]     Topamax [topiramate]     Tramadol      Past Medical History   Diagnosis Date    Anxiety     CAD (coronary artery disease), native coronary artery     Current smoker     Depression     HTN (hypertension), benign     Hyperlipidemia      "

## 2017-02-07 NOTE — DISCHARGE INSTRUCTIONS
Continue taking Aspirin 325mg daily.      What is a TIA?  A TIA (Transient Ischemic Attack) is an early warning that a stroke (also called a brain attack) is coming. A TIA is a temporary stroke. It causes no lasting damage. But the effects of a stroke, if it happens, can be very serious and lasting. If you think you are having symptoms of a TIA or stroke--even if they dont last--get medical help right away.     If you have any symptoms of a TIA or stroke, call 911 and your doctor as soon as possible.     Symptoms of TIA and stroke  Symptoms may come on suddenly and last for a few seconds or a few hours. You may have symptoms only once. Or they may come and go for days. If you notice any of the following symptoms, dont wait. Call 911 or emergency services right away.  · Weakness, numbness, tingling, or loss of feeling in your face, arm, or leg.  · Trouble seeing in one or both eyes; double vision.  · Slurred speech, trouble talking, or problems understanding others when they speak  · Sudden, severe headache  · Dizziness or a feeling of spinning  · Loss of balance or falling  · Blackouts  Date Last Reviewed: 6/8/2015  © 6754-6102 [employer]. [address] [place] [address], [place] [address]. All rights reserved. This information is not intended as a substitute for professional medical care. Always follow your healthcare professional's instructions.        Discharge Instructions for Transient Ischemic Attack (TIA)  You have been diagnosed with a transient ischemic attack (TIA). You can think of a TIA as a temporary or mini-stroke. Blood temporarily could not reach part of your brain. Unlike a stroke, TIAs usually cause no lasting damage. If you think you are having symptoms of a TIA or stroke, get medical help right away--even if the symptoms go away.   Prevention  · Take your medications exactly as directed. Dont skip doses.  · Learn to take your blood pressure. Keep a log for your doctor.  · Change your  diet if your doctor tells you to. Your doctor may suggest that you cut back on salt. If so, here are some tips:  ¨ Limit canned, dried, packaged, and fast foods.  ¨ Dont add salt to your food at the table.  ¨ Season foods with herbs instead of salt when you cook.  · Maintain a healthy weight. Get help to lose any extra pounds.  · Begin an exercise program. Ask your doctor how to get started. You can benefit from simple activities, such as walking or gardening.  · Limit your alcohol intake to no more than 2 drinks a day.  · Know your cholesterol level. Follow your doctors advice about how to keep cholesterol under control.  · If you are a smoker, you need to quit now. Enroll in a stop-smoking program to improve your chances of success. Ask your doctor about medications or other methods to help you quit.  · Your health care provider will give you information on dietary changes that you may need to make, based on your situation. Your provider may recommend that you see a registered dietitian for help with diet changes. Changes may include:  ¨ Reducing fat and cholesterol intake  ¨ Reducing sodium (salt) intake, especially if you have high blood pressure  ¨ Increasing your intake of fresh vegetables and fruits  ¨ Eating lean proteins, such as fish, poultry, and legumes (beans and peas) and eating less red meat and processed meats  ¨ Using low-fat dairy products  ¨ Using vegetable and nut oils in limited amounts  ¨ Limiting sweets and processed foods such as chips, cookies, and baked goods  · If you are overweight, your health care provider will work with you to lose weight and lower your body mass index (BMI) to a normal or near-normal level. Making diet changes and increasing physical activity can help.  · Begin an exercise program. Ask your doctor how to get started and how much activity you should try to get on a daily or weekly basis. You can benefit from simple activities such as walking or gardening.  · Learn  stress-management techniques to help you deal with stress in your home and work life.  Follow-up care  · Some medications require blood tests to check for progress or problems. Keep follow-up appointments for any blood tests ordered by your doctors.     When to seek medical care  Call 911 right away if you have any of the following:  · Weakness, tingling, or loss of feeling on one side of your face or body  · Sudden double vision, or trouble seeing in one or both eyes  · Sudden trouble talking, or slurring your speech  · Trouble understanding others  · Sudden, severe headache  · Dizziness, loss of balance, or a spinning feeling, a sense of falling  · Blackouts or seizures   Date Last Reviewed: 6/13/2015  © 1837-1321 [employer]. [address] [place] [address], [place] [address]. All rights reserved. This information is not intended as a substitute for professional medical care. Always follow your healthcare professional's instructions.

## 2017-02-07 NOTE — SUBJECTIVE & OBJECTIVE
Symptom/Intervention Times  Last Known Normal Date:: 02/06/17 (02/06/17 1954)  Last Known Normal Time:: 1630 (02/06/17 1954)  Symptom Onset Date:: 02/06/17 (02/06/17 1954)  Symptom Onset Time:: 1630 (02/06/17 1954)  Stroke Team Called Date:: 02/06/17 (02/06/17 1954)  Stroke Team Called Time:: 1921 (02/06/17 1954)  Stroke Team Arrival Date:: 02/06/17 (02/06/17 1954)  Stroke Team Arrival Time:: 1921 (02/06/17 1954)  CT Interpretation Time:: 1935 (02/06/17 1954)  Intervention Time::  (no intervention) (02/06/17 1954)    Past Medical History   Diagnosis Date    Anxiety     CAD (coronary artery disease), native coronary artery     Current smoker     Depression     HTN (hypertension), benign     Hyperlipidemia     Stroke 01/17/2017     Past Surgical History   Procedure Laterality Date    Femoral stents      Abdominal aortic stent      Cholecystectomy      Hysterectomy      Bladder suspension      Hernia repair       No family history on file.  Social History   Substance Use Topics    Smoking status: Current Every Day Smoker     Packs/day: 2.00     Types: Cigarettes    Smokeless tobacco: Never Used    Alcohol use None     Review of patient's allergies indicates:   Allergen Reactions    Bentyl [dicyclomine]     Clindamycin     Macrobid [nitrofurantoin monohyd/m-cryst]     Percocet [oxycodone-acetaminophen]     Thorazine [chlorpromazine]     Topamax [topiramate]     Tramadol      Medications: I have reviewed the current medication administration record.      (Not in a hospital admission)    Review of Systems   Constitutional: Positive for diaphoresis. Negative for chills and fever.   HENT: Negative for facial swelling.    Respiratory: Negative for cough.    Cardiovascular: Positive for chest pain.   Gastrointestinal: Negative for abdominal pain and vomiting.   Musculoskeletal: Negative for joint swelling.   Skin: Negative for rash.   Neurological: Positive for weakness and numbness. Negative for  dizziness, facial asymmetry and headaches.   Psychiatric/Behavioral: Negative for agitation.     Objective:     Vital Signs (Most Recent):  Temp: 98.3 °F (36.8 °C) (02/06/17 1925)  Pulse: (!) 57 (02/06/17 1925)  Resp: 19 (02/06/17 1925)  BP: 100/67 (02/06/17 1925)  SpO2: 98 % (02/06/17 1925)    Vital Signs Range (Last 24H):  Temp:  [98.3 °F (36.8 °C)]   Pulse:  [57]   Resp:  [19]   BP: (100)/(67)   SpO2:  [98 %]     Physical Exam   Constitutional: She is oriented to person, place, and time. She appears well-developed and well-nourished. No distress.   HENT:   Head: Normocephalic and atraumatic.   Eyes: EOM are normal. Pupils are equal, round, and reactive to light.   Neck: Normal range of motion.   Cardiovascular: Bradycardia present.    Pulmonary/Chest: Effort normal. No respiratory distress.   Abdominal: She exhibits no distension.   Musculoskeletal: Normal range of motion.   Neurological: She is alert and oriented to person, place, and time.   Skin: Skin is warm and dry. She is not diaphoretic.   Psychiatric: She has a normal mood and affect.       Neurological Exam:   LOC: alert and follows requests  Language: No aphasia  Speech: No dysarthria  Orientation: Person, Place, Time  Memory: Recent memory intact, Remote memory intact, Age correct, Month correct  Visual Fields (CN II): Full  EOM (CN III, IV, VI): Full/intact  Pupils (CN III, IV, VI): PERRL  Facial Sensation (CN V): Facial sensory loss left lower  Facial Movement (CN VII): symmetric facial expression  Hearing (CN VIII): intact bilaterally  Motor*: Arm Left:  Paretic:  4/5, Leg Left:   Paretic:  4/5, Arm Right:   Normal (5/5), Leg Right:   Normal (5/5), patient has no drift on L side, slight flexor weakness in LLE and slight extensor weakness in LUE  Cerebellar*: Normal limb, Normal gait  , Normal stance  Sensation: slight decrease in sensation on L  Tone: Arm-Left: normal; Leg-Left: normal; Arm-Right: normal; Leg-Right: normal    Stroke Team Times:    Last Known Normal Date and Time : :30  Symptom Onset Date and Time::30  Stroke Team Called Date and Time: :  Stroke Team Arrived Date and Time: :  CT Interpretation Time: 19:35    NIH Stroke Scale:  Interval: baseline (upon arrival/admit)  Level of Consciousness: 0 - alert  LOC Questions: 0 - answers both correctly  LOC Commands: 0 - performs both correctly  Best Gaze: 0 - normal  Visual: 0 - no visual loss  Facial Palsy: 0 - normal  Motor Left Arm: 0 - no drift  Motor Right Arm: 0 - no drift  Motor Left Le - no drift  Motor Right Le - no drift  Limb Ataxia: 0 - absent  Sensory: 1 - mild to moderate loss  Best Language: 0 - no aphasia  Dysarthria: 0 - normal articulation  Extinction and Inattention: 0 - no neglect  NIH Stroke Scale Total: 1  Modified [place]'s Scale:   Timeline: Prior to symptoms onset  Modified [place]'s Score: 3 - moderate disability        Laboratory:  CMP: No results for input(s): GLUCOSE, CALCIUM, ALBUMIN, PROT, NA, K, CO2, CL, BUN, CREATININE, ALKPHOS, ALT, AST, BILITOT in the last 24 hours.  CBC: No results for input(s): WBC, RBC, HGB, HCT, PLT, MCV, MCH, MCHC in the last 168 hours.  Lipid Panel: No results for input(s): CHOL, LDLCALC, HDL, TRIG in the last 168 hours.  Coagulation: No results for input(s): INR, APTT in the last 168 hours.    Invalid input(s): PT  Platelet Aggregation Study: No results for input(s): PLTAGG, PLTAGINTERP, PLTAGREGLACO, ADPPLTAGGREG in the last 168 hours.  Hgb A1C: No results for input(s): HGBA1C in the last 168 hours.  TSH: No results for input(s): TSH in the last 168 hours.    Diagnostic Results:    Brain Imaging: CT Head. Date: 17  Acute Pathology: None    CTA Stroke Multiphase LAST ADMISSION. Date: 17  -no acute infarct  -no acute hemorrhage  -no evidence of stenosis, aneurysm, dissection, or AVM  -mild atherosclerosis at origin of L common carotic artery with 20% stenosis  -hypodense area in R BG,  likely remote lacunar infarct    MRI Brain LAST ADMISSION. Date: 01/18/17  -no acute intracranial pathology  -no evidence of infarct or hemorrhage    Echo. LAST ADMISSION. Date: 01/19/17  -EF 65%  -no LA enlargement  -concentric remodeling  -no evidence of pericardial effusion, intracavity mass, thrombi, or vegetation

## 2017-02-07 NOTE — CONSULTS
[facility]  Vascular Neurology  Comprehensive Stroke Center  Consult Note      Inpatient consult to Vascular (Stroke) Neurology  Consult performed by: ELIAN LOFTON  Consult ordered by: NYDIA NGUYEN        Subjective:     History of Present Illness:  Shun Tidwell is a 51 y.o. Female with a PMHx of anxiety/depression, CAD, HTN, HLD presenting with complaints of chest pain, diaphoresis, L sided weakness, and L sided numbness (arm, leg, and face). Patient states that her weakness and numbness are the same symptoms she had when she was last admitted in January 2016. She states that her symptoms had worsened today. She was last known to be normal at 4:30pm.    Symptom/Intervention Times  Last Known Normal Date:: 02/06/17 (02/06/17 1954)  Last Known Normal Time:: 1630 (02/06/17 1954)  Symptom Onset Date:: 02/06/17 (02/06/17 1954)  Symptom Onset Time:: 1630 (02/06/17 1954)  Stroke Team Called Date:: 02/06/17 (02/06/17 1954)  Stroke Team Called Time:: 1921 (02/06/17 1954)  Stroke Team Arrival Date:: 02/06/17 (02/06/17 1954)  Stroke Team Arrival Time:: 1921 (02/06/17 1954)  CT Interpretation Time:: 1935 (02/06/17 1954)  Intervention Time::  (no intervention) (02/06/17 1954)    Past Medical History   Diagnosis Date    Anxiety     CAD (coronary artery disease), native coronary artery     Current smoker     Depression     HTN (hypertension), benign     Hyperlipidemia     Stroke 01/17/2017     Past Surgical History   Procedure Laterality Date    Femoral stents      Abdominal aortic stent      Cholecystectomy      Hysterectomy      Bladder suspension      Hernia repair       No family history on file.  Social History   Substance Use Topics    Smoking status: Current Every Day Smoker     Packs/day: 2.00     Types: Cigarettes    Smokeless tobacco: Never Used    Alcohol use None     Review of patient's allergies indicates:   Allergen Reactions    Bentyl [dicyclomine]     Clindamycin      Macrobid [nitrofurantoin monohyd/m-cryst]     Percocet [oxycodone-acetaminophen]     Thorazine [chlorpromazine]     Topamax [topiramate]     Tramadol      Medications: I have reviewed the current medication administration record.      (Not in a hospital admission)    Review of Systems   Constitutional: Positive for diaphoresis. Negative for chills and fever.   HENT: Negative for facial swelling.    Respiratory: Negative for cough.    Cardiovascular: Positive for chest pain.   Gastrointestinal: Negative for abdominal pain and vomiting.   Musculoskeletal: Negative for joint swelling.   Skin: Negative for rash.   Neurological: Positive for weakness and numbness. Negative for dizziness, facial asymmetry and headaches.   Psychiatric/Behavioral: Negative for agitation.     Objective:     Vital Signs (Most Recent):  Temp: 98.3 °F (36.8 °C) (02/06/17 1925)  Pulse: (!) 57 (02/06/17 1925)  Resp: 19 (02/06/17 1925)  BP: 100/67 (02/06/17 1925)  SpO2: 98 % (02/06/17 1925)    Vital Signs Range (Last 24H):  Temp:  [98.3 °F (36.8 °C)]   Pulse:  [57]   Resp:  [19]   BP: (100)/(67)   SpO2:  [98 %]     Physical Exam   Constitutional: She is oriented to person, place, and time. She appears well-developed and well-nourished. No distress.   HENT:   Head: Normocephalic and atraumatic.   Eyes: EOM are normal. Pupils are equal, round, and reactive to light.   Neck: Normal range of motion.   Cardiovascular: Bradycardia present.    Pulmonary/Chest: Effort normal. No respiratory distress.   Abdominal: She exhibits no distension.   Musculoskeletal: Normal range of motion.   Neurological: She is alert and oriented to person, place, and time.   Skin: Skin is warm and dry. She is not diaphoretic.   Psychiatric: She has a normal mood and affect.       Neurological Exam:   LOC: alert and follows requests  Language: No aphasia  Speech: No dysarthria  Orientation: Person, Place, Time  Memory: Recent memory intact, Remote memory intact, Age  correct, Month correct  Visual Fields (CN II): Full  EOM (CN III, IV, VI): Full/intact  Pupils (CN III, IV, VI): PERRL  Facial Sensation (CN V): Facial sensory loss left lower  Facial Movement (CN VII): symmetric facial expression  Hearing (CN VIII): intact bilaterally  Motor*: Arm Left:  Paretic:  4/5, Leg Left:   Paretic:  4/5, Arm Right:   Normal (5/5), Leg Right:   Normal (5/5), patient has no drift on L side, slight flexor weakness in LLE and slight extensor weakness in LUE  Cerebellar*: Normal limb, Normal gait  , Normal stance  Sensation: slight decrease in sensation on L  Tone: Arm-Left: normal; Leg-Left: normal; Arm-Right: normal; Leg-Right: normal    Stroke Team Times:   Last Known Normal Date and Time : 201716:30  Symptom Onset Date and Time::30  Stroke Team Called Date and Time: :  Stroke Team Arrived Date and Time: :  CT Interpretation Time: 19:35    NIH Stroke Scale:  Interval: baseline (upon arrival/admit)  Level of Consciousness: 0 - alert  LOC Questions: 0 - answers both correctly  LOC Commands: 0 - performs both correctly  Best Gaze: 0 - normal  Visual: 0 - no visual loss  Facial Palsy: 0 - normal  Motor Left Arm: 0 - no drift  Motor Right Arm: 0 - no drift  Motor Left Le - no drift  Motor Right Le - no drift  Limb Ataxia: 0 - absent  Sensory: 1 - mild to moderate loss  Best Language: 0 - no aphasia  Dysarthria: 0 - normal articulation  Extinction and Inattention: 0 - no neglect  NIH Stroke Scale Total: 1  Modified Sony Scale:   Timeline: Prior to symptoms onset  Modified Sony Score: 3 - moderate disability        Laboratory:  CMP: No results for input(s): GLUCOSE, CALCIUM, ALBUMIN, PROT, NA, K, CO2, CL, BUN, CREATININE, ALKPHOS, ALT, AST, BILITOT in the last 24 hours.  CBC: No results for input(s): WBC, RBC, HGB, HCT, PLT, MCV, MCH, MCHC in the last 168 hours.  Lipid Panel: No results for input(s): CHOL, LDLCALC, HDL, TRIG in the last 168  hours.  Coagulation: No results for input(s): INR, APTT in the last 168 hours.    Invalid input(s): PT  Platelet Aggregation Study: No results for input(s): PLTAGG, PLTAGINTERP, PLTAGREGLACO, ADPPLTAGGREG in the last 168 hours.  Hgb A1C: No results for input(s): HGBA1C in the last 168 hours.  TSH: No results for input(s): TSH in the last 168 hours.    Diagnostic Results:    Brain Imaging: CT Head. Date: 02/06/17  Acute Pathology: None    CTA Stroke Multiphase LAST ADMISSION. Date: 01/17/17  -no acute infarct  -no acute hemorrhage  -no evidence of stenosis, aneurysm, dissection, or AVM  -mild atherosclerosis at origin of L common carotic artery with 20% stenosis  -hypodense area in R BG, likely remote lacunar infarct    MRI Brain LAST ADMISSION. Date: 01/18/17  -no acute intracranial pathology  -no evidence of infarct or hemorrhage    Echo. LAST ADMISSION. Date: 01/19/17  -EF 65%  -no LA enlargement  -concentric remodeling  -no evidence of pericardial effusion, intracavity mass, thrombi, or vegetation       Assessment/Plan:     Patient is a 51 y.o. year old female presenting with L sided weakness and numbness beginning at 4:30pm on 02/06/17. On arrival, patient has NIH of 1. Exam revealed some mild-moderate sensory loss in L face, arm, and leg. In addition, patient has LUE extensor weakness and LLE flexor weakness. CT head was ordered and revealed no acute intracranial abnormalities.     After further chart review, it was discovered that the patient was discharged from the stroke unit in January. Patient had complete stroke workup. MRI revealed no acute infarct and CTA showed no significant vascular abnormalities. On echo, she has an EF of 65% with no LA enlargement. An discharge, her NIH was 1 for L sided sensory loss. PT/OT/speech evaluated patient and recommended home health, but due to insurance issues, the patient was discharged with outpatient therapy. In addition, patient was given DME (cane, bedside commode,  and shower chair).     During her last admission, patient was noted to be tearful with discussion of her home life. Apparently patient did not have home at discharge and was currently on a fixed income. Patient stated she would go to her son's home at discharge. She has a history of anxiety and depression, which is being managed with Klonopin.    Given these circumstances, negative stroke workup, and no new neurologic deficits, it is unlikely that the patient is having an acute stroke. Patient's exam is at her baseline from her recent discharge. Conversion disorder or metabolic etiology is more likely the cause for her symptoms. Discussed this patient with staff (Dr. Booth) and in agreement that the patient does not require an intervention or require admission to stroke service for any additional stroke workup.    Numbness and tingling of left arm and leg  Patient at baseline from last admission  Discharged from stroke service with mild-moderate sensory loss    Numbness and tingling of left side of face  Patient at baseline from last admission  Discharged from stroke service with mild-moderate sensory loss    Left hemiparesis  Patient at baseline from last admission  Patient had no drift in LUE and LLE on discharge  Today, patient still has no drift on exam. Some flexor weakness in LLE and extensor weakness in LUE. However, appears to be at her baseline given that she required DME on last admission and has outpatient therapy.    Hyperlipidemia  Stroke risk factor  LDL 74.2 on 1/17/17  Continue atorvastatin 80    HTN (hypertension), benign  Stroke risk factor  SBP < 140  Continue home lisinopril 10      Thrombolysis Candidate? No  1. Contraindications: no new focal neurologic deficits      Interventional Revascularization Candidate?  No, no new focal neurologic deficits    Research Candidate? No          Christa Natarajan PA-C  [facility]  Department of Vascular Neurology   [facility]  [place]

## 2017-02-09 LAB — BACTERIA UR CULT: NORMAL

## 2017-02-20 ENCOUNTER — OFFICE VISIT (OUTPATIENT)
Dept: NEUROLOGY | Facility: CLINIC | Age: 52
End: 2017-02-20
Payer: MEDICAID

## 2017-02-20 VITALS
HEIGHT: 62 IN | SYSTOLIC BLOOD PRESSURE: 97 MMHG | WEIGHT: 122.13 LBS | BODY MASS INDEX: 22.48 KG/M2 | DIASTOLIC BLOOD PRESSURE: 63 MMHG | HEART RATE: 69 BPM

## 2017-02-20 DIAGNOSIS — F45.1 SOMATIC SYMPTOM DISORDER: Primary | ICD-10-CM

## 2017-02-20 PROBLEM — R20.0 NUMBNESS AND TINGLING OF LEFT SIDE OF FACE: Status: RESOLVED | Noted: 2017-02-06 | Resolved: 2017-02-20

## 2017-02-20 PROBLEM — R20.2 NUMBNESS AND TINGLING OF LEFT SIDE OF FACE: Status: RESOLVED | Noted: 2017-02-06 | Resolved: 2017-02-20

## 2017-02-20 PROBLEM — F45.0 SOMATIZATION DISORDER: Status: ACTIVE | Noted: 2017-02-20

## 2017-02-20 PROBLEM — I63.511 ACUTE RIGHT MCA STROKE: Status: RESOLVED | Noted: 2017-01-17 | Resolved: 2017-02-20

## 2017-02-20 PROBLEM — R20.0 NUMBNESS AND TINGLING OF LEFT ARM AND LEG: Status: RESOLVED | Noted: 2017-02-06 | Resolved: 2017-02-20

## 2017-02-20 PROBLEM — R20.2 NUMBNESS AND TINGLING OF LEFT ARM AND LEG: Status: RESOLVED | Noted: 2017-02-06 | Resolved: 2017-02-20

## 2017-02-20 PROBLEM — G81.94 LEFT HEMIPARESIS: Status: RESOLVED | Noted: 2017-01-18 | Resolved: 2017-02-20

## 2017-02-20 PROBLEM — Z92.82 TISSUE PLASMINOGEN ACTIVATOR (T-PA) ADMINISTERED AT OTHER FACILITY WITHIN 24 HOURS PRIOR TO CURRENT ADMISSION: Status: RESOLVED | Noted: 2017-01-18 | Resolved: 2017-02-20

## 2017-02-20 PROCEDURE — 99213 OFFICE O/P EST LOW 20 MIN: CPT | Mod: S$PBB,,, | Performed by: PSYCHIATRY & NEUROLOGY

## 2017-02-20 PROCEDURE — 99213 OFFICE O/P EST LOW 20 MIN: CPT | Mod: PBBFAC | Performed by: PSYCHIATRY & NEUROLOGY

## 2017-02-20 PROCEDURE — 99999 PR PBB SHADOW E&M-EST. PATIENT-LVL III: CPT | Mod: PBBFAC,,, | Performed by: PSYCHIATRY & NEUROLOGY
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
## 2020-04-03 ENCOUNTER — NURSE TRIAGE (OUTPATIENT)
Dept: ADMINISTRATIVE | Facility: CLINIC | Age: 55
End: 2020-04-03

## 2020-04-03 NOTE — TELEPHONE ENCOUNTER
Reason for Disposition   MODERATE rectal bleeding (small blood clots, passing blood without stool, or toilet water turns red) more than once a day    Additional Information   Negative: Passed out (i.e., fainted, collapsed and was not responding)   Negative: Shock suspected (e.g., cold/pale/clammy skin, too weak to stand, low BP, rapid pulse)   Negative: Vomiting red blood or black (coffee ground) material   Negative: Sounds like a life-threatening emergency to the triager   Negative: Diarrhea is the main symptom   Negative: Rectal symptoms   Negative: SEVERE rectal bleeding (large blood clots; on and off, or constant bleeding)   Negative: SEVERE dizziness (e.g., unable to stand, requires support to walk, feels like passing out now)    Protocols used: RECTAL BLEEDING-A-OH  having stomach ache, bloating and bleed in stool x2 today, cramping started today. bloated x 3 days, nauseated. Pt admits to poss tear at rectum for passing freq BMs. Seen for UTI and gave abx. changed med due to nausea. BM not loose. Some flatus. Able to coretta fluids, last uop - at 1pm, clear yellow. Had Zofran at home. rec ED due to multiple episodes of blood in stool.

## 2020-06-26 ENCOUNTER — HISTORICAL (OUTPATIENT)
Dept: ADMINISTRATIVE | Facility: HOSPITAL | Age: 55
End: 2020-06-26

## 2020-06-26 LAB
ALBUMIN SERPL BCP-MCNC: 3.7 G/DL (ref 3.5–5)
ALBUMIN/GLOB SERPL ELPH: 1.1 {RATIO} (ref 1.5–2.2)
ALP SERPL-CCNC: 63 U/L (ref 45–117)
ALT SERPL W P-5'-P-CCNC: 20 U/L (ref 13–56)
ANION GAP SERPL CALC-SCNC: 6.8 MEQ/L (ref 10–20)
AST SERPL-CCNC: 11 U/L (ref 15–37)
BASOPHILS NFR BLD: 0.1 10 (ref 0–0.1)
BASOPHILS NFR BLD: 1.1 % (ref 0–1.5)
BILIRUB SERPL-MCNC: 0.32 MG/DL (ref 0.2–1)
BNP SERPL-MCNC: 34 PG/ML (ref 0–225)
BUN SERPL-MCNC: 21 MG/DL (ref 7–18)
CALCIUM SERPL-MCNC: 9 MG/DL (ref 8.5–10.1)
CHLORIDE SERPL-SCNC: 111 MMOL/L (ref 98–107)
CK SERPL-CCNC: 49 U/L (ref 26–192)
CO2 SERPL-SCNC: 26 MMOL/L (ref 22–32)
CPK MB: <1 NG/ML (ref 0–3.6)
CREAT SERPL-MCNC: 0.78 MG/DL (ref 0.55–1.02)
EGFR: 81 ML/MIN/1.73M
EOSINOPHIL NFR BLD: 0.1 10 (ref 0–0.7)
EOSINOPHIL NFR BLD: 2.5 % (ref 0–7)
ERYTHROCYTE [DISTWIDTH] IN BLOOD BY AUTOMATED COUNT: 12.8 % (ref 11.5–14.5)
GLOBULIN: 3.5 G/DL (ref 2.3–3.5)
GLUCOSE SERPL-MCNC: 91 MG/DL (ref 70–99)
GRAN #: 4.08 10 (ref 2–7.5)
GRAN%: 0.2 %
GRAN%: 71.6 % (ref 50–80)
HCT VFR BLD AUTO: 41.6 % (ref 37.7–47.9)
HGB BLD-MCNC: 13.9 G/DL (ref 12.2–16.2)
IMMATURE GRANULOCYTES #: 0.01 10
INDEX: 0 % (ref 0–2.5)
LYMPH #: 1.1 10 (ref 1–3.5)
LYMPH%: 18.6 % (ref 12–50)
MAGNESIUM SERPL-MCNC: 2.25 MG/DL (ref 1.8–2.4)
MCH RBC QN AUTO: 31.5 PG (ref 27–31)
MCHC RBC AUTO-ENTMCNC: 33.4 G% (ref 32–35)
MCV RBC AUTO: 94.3 FL (ref 80–97)
MONO #: 0.3 10 (ref 0–0.8)
MONO%: 6 % (ref 0–12)
OSMOC: 282 MOSM/KG (ref 275–295)
PMV BLD AUTO: 10.4 FL (ref 7.4–10.4)
PMV BLD AUTO: 194 10 (ref 142–424)
POTASSIUM SERPL-SCNC: 3.8 MMOL/L (ref 3.5–5.1)
PROT SERPL-MCNC: 7.2 G/DL (ref 6.4–8.2)
RBC # BLD AUTO: 4.41 M/UL (ref 4.04–5.48)
SODIUM BLD-SCNC: 140 MMOL/L (ref 136–145)
TROPONIN I SERPL DL<=0.01 NG/ML-MCNC: <0.02 NG/ML (ref 0–0.05)
WBC # BLD AUTO: 5.7 10 (ref 4–10.2)

## 2020-07-15 ENCOUNTER — HISTORICAL (OUTPATIENT)
Dept: ADMINISTRATIVE | Facility: HOSPITAL | Age: 55
End: 2020-07-15

## 2020-07-17 LAB
ALBUMIN SERPL BCP-MCNC: 3.4 G/DL (ref 3.5–5)
ALBUMIN/GLOB SERPL ELPH: 0.9 {RATIO} (ref 1.5–2.2)
ALP SERPL-CCNC: 62 U/L (ref 45–117)
ALT SERPL W P-5'-P-CCNC: 23 U/L (ref 13–56)
ANION GAP SERPL CALC-SCNC: 11.4 MEQ/L (ref 10–20)
APPEARANCE, UA: CLEAR
AST SERPL-CCNC: 11 U/L (ref 15–37)
BACTERIA SPEC CULT: NEGATIVE /HPF
BASOPHILS NFR BLD: 0.1 10 (ref 0–0.1)
BASOPHILS NFR BLD: 1.2 % (ref 0–1.5)
BILIRUB SERPL-MCNC: 0.27 MG/DL (ref 0.2–1)
BILIRUB UR QL STRIP: NEGATIVE MG/DL
BUDDING YEAST: NORMAL /HPF
BUN SERPL-MCNC: 11 MG/DL (ref 7–18)
CALCIUM SERPL-MCNC: 8.8 MG/DL (ref 8.5–10.1)
CASTS, URINE MICROSCOPIC: NEGATIVE /LPF
CHLORIDE SERPL-SCNC: 110 MMOL/L (ref 98–107)
CO2 SERPL-SCNC: 23 MMOL/L (ref 22–32)
COLOR UR: YELLOW
CREAT SERPL-MCNC: 1.01 MG/DL (ref 0.55–1.02)
EGFR: 60 ML/MIN/1.73M
EOSINOPHIL NFR BLD: 0.1 10 (ref 0–0.7)
EOSINOPHIL NFR BLD: 2.2 % (ref 0–7)
EPITHELIAL, URINE MICROSCOPIC: NEGATIVE /HPF
ERYTHROCYTE [DISTWIDTH] IN BLOOD BY AUTOMATED COUNT: 12.7 % (ref 11.5–14.5)
GLOBULIN: 3.6 G/DL (ref 2.3–3.5)
GLUCOSE (UA): NEGATIVE MG/DL
GLUCOSE SERPL-MCNC: 103 MG/DL (ref 70–99)
GRAN #: 3.01 10 (ref 2–7.5)
GRAN%: 0.2 %
GRAN%: 72.8 % (ref 50–80)
HCT VFR BLD AUTO: 39.1 % (ref 37.7–47.9)
HGB BLD-MCNC: 12.9 G/DL (ref 12.2–16.2)
HGB UR QL STRIP: NEGATIVE ERY/UL
IMMATURE GRANULOCYTES #: 0.01 10
KETONES UR QL STRIP: NEGATIVE MG/DL
LEUKOCYTE ESTERASE UR QL STRIP: NEGATIVE LEU/UL
LIPASE SERPL-CCNC: 170 U/L (ref 73–393)
LYMPH #: 0.7 10 (ref 1–3.5)
LYMPH%: 17.1 % (ref 12–50)
MCH RBC QN AUTO: 30.9 PG (ref 27–31)
MCHC RBC AUTO-ENTMCNC: 33 G% (ref 32–35)
MCV RBC AUTO: 93.8 FL (ref 80–97)
MONO #: 0.3 10 (ref 0–0.8)
MONO%: 6.5 % (ref 0–12)
NITRITE UR QL STRIP: NEGATIVE MG/DL
OSMOC: 279 MOSM/KG (ref 275–295)
PH UR STRIP: 6 [PH] (ref 5–7.5)
PMV BLD AUTO: 10.2 FL (ref 7.4–10.4)
PMV BLD AUTO: 211 10 (ref 142–424)
POTASSIUM SERPL-SCNC: 4.4 MMOL/L (ref 3.5–5.1)
PROT SERPL-MCNC: 7 G/DL (ref 6.4–8.2)
PROT UR QL STRIP: NEGATIVE MG/DL
RBC # BLD AUTO: 4.17 M/UL (ref 4.04–5.48)
RBC #/AREA URNS HPF: NEGATIVE /HPF
SODIUM BLD-SCNC: 140 MMOL/L (ref 136–145)
SP GR UR STRIP: 1.01 (ref 1–1.03)
SPERM, URINE MICROSCOPIC: NORMAL /HPF
TYPE OF SPECIMEN  (UA): NORMAL
UNCLASSIFIED CRYSTALS, UA: NORMAL /HPF
UROBILINOGEN UR STRIP-ACNC: NORMAL EU/L
WBC # BLD AUTO: 4.1 10 (ref 4–10.2)
WBC #/AREA URNS HPF: NEGATIVE /HPF

## 2020-11-13 ENCOUNTER — NURSE TRIAGE (OUTPATIENT)
Dept: ADMINISTRATIVE | Facility: CLINIC | Age: 55
End: 2020-11-13

## 2020-11-14 ENCOUNTER — HOSPITAL ENCOUNTER (EMERGENCY)
Facility: HOSPITAL | Age: 55
Discharge: HOME | End: 2020-11-14
Attending: EMERGENCY MEDICINE
Payer: MEDICAID

## 2020-11-14 VITALS
OXYGEN SATURATION: 100 % | HEART RATE: 81 BPM | WEIGHT: 123 LBS | BODY MASS INDEX: 22.63 KG/M2 | SYSTOLIC BLOOD PRESSURE: 113 MMHG | DIASTOLIC BLOOD PRESSURE: 68 MMHG | RESPIRATION RATE: 20 BRPM | TEMPERATURE: 98 F | HEIGHT: 62 IN

## 2020-11-14 DIAGNOSIS — J04.0 LARYNGITIS, ACUTE: ICD-10-CM

## 2020-11-14 DIAGNOSIS — J01.90 ACUTE NON-RECURRENT SINUSITIS, UNSPECIFIED LOCATION: Primary | ICD-10-CM

## 2020-11-14 LAB
CTP QC/QA: YES
GROUP A STREP, MOLECULAR: NEGATIVE
SARS-COV-2 RDRP RESP QL NAA+PROBE: NEGATIVE

## 2020-11-14 PROCEDURE — 63600175 PHARM REV CODE 636 W HCPCS: Performed by: NURSE PRACTITIONER

## 2020-11-14 PROCEDURE — U0002 COVID-19 LAB TEST NON-CDC: HCPCS | Performed by: NURSE PRACTITIONER

## 2020-11-14 PROCEDURE — 25000003 PHARM REV CODE 250: Performed by: NURSE PRACTITIONER

## 2020-11-14 PROCEDURE — 99284 EMERGENCY DEPT VISIT MOD MDM: CPT | Mod: 25

## 2020-11-14 PROCEDURE — 87651 STREP A DNA AMP PROBE: CPT

## 2020-11-14 RX ORDER — AMOXICILLIN AND CLAVULANATE POTASSIUM 875; 125 MG/1; MG/1
1 TABLET, FILM COATED ORAL
Status: COMPLETED | OUTPATIENT
Start: 2020-11-14 | End: 2020-11-14

## 2020-11-14 RX ORDER — PREDNISONE 20 MG/1
20 TABLET ORAL
Status: COMPLETED | OUTPATIENT
Start: 2020-11-14 | End: 2020-11-14

## 2020-11-14 RX ORDER — ASPIRIN 81 MG/1
81 TABLET ORAL DAILY
COMMUNITY

## 2020-11-14 RX ORDER — PREDNISONE 20 MG/1
20 TABLET ORAL DAILY
Qty: 5 TABLET | Refills: 0 | Status: SHIPPED | OUTPATIENT
Start: 2020-11-14 | End: 2020-11-19

## 2020-11-14 RX ORDER — NAPROXEN 500 MG/1
500 TABLET ORAL
Status: COMPLETED | OUTPATIENT
Start: 2020-11-14 | End: 2020-11-14

## 2020-11-14 RX ORDER — AMOXICILLIN AND CLAVULANATE POTASSIUM 875; 125 MG/1; MG/1
1 TABLET, FILM COATED ORAL 2 TIMES DAILY
Qty: 14 TABLET | Refills: 0 | Status: SHIPPED | OUTPATIENT
Start: 2020-11-14 | End: 2024-02-19 | Stop reason: ALTCHOICE

## 2020-11-14 RX ADMIN — NAPROXEN 500 MG: 500 TABLET ORAL at 12:11

## 2020-11-14 RX ADMIN — AMOXICILLIN AND CLAVULANATE POTASSIUM 1 TABLET: 875; 125 TABLET, FILM COATED ORAL at 01:11

## 2020-11-14 RX ADMIN — PREDNISONE 20 MG: 20 TABLET ORAL at 12:11

## 2020-11-14 NOTE — ED PROVIDER NOTES
Encounter Date: 11/14/2020       History     Chief Complaint   Patient presents with    COVID-19 Concerns     Pt states having headaches, hoarse, and cough x3 weeks.     55-year-old female with complaints of headache x3 weeks.  Patient has associated cough and hoarseness.  Patient is a smoker.  Patient says she has had recent contact with her with her neighbor who was diagnosed with COVID 19.  Patient denies fever runny nose he ear pain nausea or vomit.  That she does makes it better.        Review of patient's allergies indicates:   Allergen Reactions    Bentyl [dicyclomine]     Clindamycin     Macrobid [nitrofurantoin monohyd/m-cryst]     Thorazine [chlorpromazine]     Topamax [topiramate]     Toradol [ketorolac]     Tramadol      Past Medical History:   Diagnosis Date    Anxiety     CAD (coronary artery disease), native coronary artery     Current smoker     Depression     HTN (hypertension), benign     Hyperlipidemia     Stroke 01/17/2017     Past Surgical History:   Procedure Laterality Date    abdominal aortic stent      BLADDER SUSPENSION      CHOLECYSTECTOMY      femoral stents      HERNIA REPAIR      HYSTERECTOMY       History reviewed. No pertinent family history.  Social History     Tobacco Use    Smoking status: Current Every Day Smoker     Packs/day: 2.00     Types: Cigarettes    Smokeless tobacco: Never Used   Substance Use Topics    Alcohol use: Yes    Drug use: No     Review of Systems   Constitutional: Negative for fever.   HENT: Positive for sore throat.         Hoarsness   Respiratory: Negative for shortness of breath.    Cardiovascular: Negative for chest pain.   Gastrointestinal: Negative for nausea.   Genitourinary: Negative for dysuria.   Musculoskeletal: Negative for back pain.   Skin: Negative for rash.   Neurological: Positive for headaches. Negative for weakness.   Hematological: Does not bruise/bleed easily.       Physical Exam     Initial Vitals [11/14/20 1226]    BP Pulse Resp Temp SpO2   110/71 71 20 98.2 °F (36.8 °C) 98 %      MAP       --         Physical Exam    Nursing note and vitals reviewed.  Constitutional: Vital signs are normal. She appears well-developed and well-nourished. She is cooperative.   HENT:   Head: Normocephalic and atraumatic.   Right Ear: Hearing and external ear normal.   Left Ear: Hearing and external ear normal.   Nose: Nose normal.   Mouth/Throat: Uvula is midline and mucous membranes are normal. Posterior oropharyngeal erythema present. No oropharyngeal exudate.       Eyes: Conjunctivae, EOM and lids are normal. Pupils are equal, round, and reactive to light.   Neck: Trachea normal, normal range of motion and full passive range of motion without pain. Neck supple.   Cardiovascular: Normal rate, regular rhythm, S1 normal, S2 normal, normal heart sounds and normal pulses.   Pulmonary/Chest: Effort normal and breath sounds normal.   Neurological: She is alert.         ED Course   Procedures  Labs Reviewed   GROUP A STREP, MOLECULAR   SARS-COV-2 RDRP GENE    Narrative:     This test utilizes isothermal nucleic acid amplification   technology to detect the SARS-CoV-2 RdRp nucleic acid segment.   The analytical sensitivity (limit of detection) is 125 genome   equivalents/mL.   A POSITIVE result implies infection with the SARS-CoV-2 virus;   the patient is presumed to be contagious.     A NEGATIVE result means that SARS-CoV-2 nucleic acids are not   present above the limit of detection. A NEGATIVE result should be   treated as presumptive. It does not rule out the possibility of   COVID-19 and should not be the sole basis for treatment decisions.   If COVID-19 is strongly suspected based on clinical and exposure   history, re-testing using an alternate molecular assay should be   considered.   This test is only for use under the Food and Drug   Administration s Emergency Use Authorization (EUA).   Commercial kits are provided by [employer].    Performance characteristics of the EUA have been independently   verified by [facility] Department of   Pathology and Laboratory Medicine.   _________________________________________________________________   The authorized Fact Sheet for Healthcare Providers and the authorized Fact   Sheet for Patients of the ID NOW COVID-19 are available on the FDA   website:     https://www.fda.gov/media/317218/download  https://www.fda.gov/media/403192/download              Imaging Results    None          Medical Decision Making:   Differential Diagnosis:   Pharyngitis  Laryngitis  Acute sinusitis  Upper respiratory infection  Sars-Covid-19  Clinical Tests:   Lab Tests: Ordered and Reviewed  ED Management:  At the history and physical exam labs were reviewed she is negative for COVID-19 and strep throat.  Discussed with patient that all of and put on antibiotics and prednisone and and she is to follow-up with her PCP.  I believe she has acute sinusitis with the laryngitis                             Clinical Impression:     ICD-10-CM ICD-9-CM   1. Acute non-recurrent sinusitis, unspecified location  J01.90 461.9   2. Laryngitis, acute  J04.0 464.00                          ED Disposition Condition    Discharge Stable        ED Prescriptions     Medication Sig Dispense Start Date End Date Auth. Provider    amoxicillin-clavulanate 875-125mg (AUGMENTIN) 875-125 mg per tablet Take 1 tablet by mouth 2 (two) times daily. 14 tablet 11/14/2020  Pollo Parada III, NP    predniSONE (DELTASONE) 20 MG tablet Take 1 tablet (20 mg total) by mouth once daily. for 5 days 5 tablet 11/14/2020 11/19/2020 Pollo Parada III, NP        Follow-up Information     Follow up With Specialties Details Why Contact Info    Anuel Geronimo NP Emergency Medicine In 1 week If symptoms worsen [address] [facility] [address]  [place] [address]  [phone]                                         Pollo Parada III, NP  11/14/20 3624

## 2020-11-14 NOTE — TELEPHONE ENCOUNTER
Patient c/o hoarseness and SOB. Per protocol, advised the patient to go to the nearest ED at [facility].    Reason for Disposition   Difficulty breathing    Additional Information   Negative: Severe difficulty breathing (e.g., struggling for each breath, speaks in single words)   Negative: Bluish (or gray) lips or face now   Negative: [1] Stridor AND [2] difficulty breathing   Negative: Started suddenly after sting from bee, wasp, or yellow jacket   Negative: Started suddenly after taking a medicine or allergic food (e.g., nuts, seafood)   Negative: Started suddenly along with widespread hives   Negative: Can't swallow normal secretions (e.g., drooling or spitting)   Negative: Tongue or facial swelling   Negative: Sounds like a life-threatening emergency to the triager   Negative: [1] Nasal allergies also present AND [2] they are acting up   Negative: Cold symptoms are main concern   Negative: Sore throat is main symptom   Negative: [1] Resolved choking episode AND [2] hoarseness lasts > 30 minutes   Negative: Direct blow to front of neck    Protocols used: [id number]

## 2020-11-20 ENCOUNTER — NURSE TRIAGE (OUTPATIENT)
Dept: ADMINISTRATIVE | Facility: CLINIC | Age: 55
End: 2020-11-20

## 2020-11-21 NOTE — TELEPHONE ENCOUNTER
Pt c/o unrelieved hoarseness since ED visit on 11/14, c/o SOB worsening today.     Reason for Disposition   Severe difficulty breathing (e.g., struggling for each breath, speaks in single words)    Additional Information   Negative: [1] Breathing stopped AND [2] hasn't returned   Negative: Choking on something    Protocols used: BREATHING DIFFICULTY-A-AH

## 2020-12-20 ENCOUNTER — HOSPITAL ENCOUNTER (EMERGENCY)
Facility: HOSPITAL | Age: 55
Discharge: HOME | End: 2020-12-20
Attending: EMERGENCY MEDICINE
Payer: MEDICAID

## 2020-12-20 VITALS
HEIGHT: 62 IN | RESPIRATION RATE: 20 BRPM | OXYGEN SATURATION: 100 % | DIASTOLIC BLOOD PRESSURE: 82 MMHG | BODY MASS INDEX: 23.74 KG/M2 | SYSTOLIC BLOOD PRESSURE: 109 MMHG | WEIGHT: 129 LBS | TEMPERATURE: 98 F | HEART RATE: 80 BPM

## 2020-12-20 DIAGNOSIS — M54.41 CHRONIC BILATERAL LOW BACK PAIN WITH BILATERAL SCIATICA: Primary | ICD-10-CM

## 2020-12-20 DIAGNOSIS — G89.29 CHRONIC BILATERAL LOW BACK PAIN WITH BILATERAL SCIATICA: Primary | ICD-10-CM

## 2020-12-20 DIAGNOSIS — M54.42 CHRONIC BILATERAL LOW BACK PAIN WITH BILATERAL SCIATICA: Primary | ICD-10-CM

## 2020-12-20 PROCEDURE — 99284 EMERGENCY DEPT VISIT MOD MDM: CPT | Mod: 25

## 2020-12-20 PROCEDURE — 63600175 PHARM REV CODE 636 W HCPCS: Performed by: NURSE PRACTITIONER

## 2020-12-20 PROCEDURE — 96372 THER/PROPH/DIAG INJ SC/IM: CPT

## 2020-12-20 PROCEDURE — 25000003 PHARM REV CODE 250: Performed by: NURSE PRACTITIONER

## 2020-12-20 RX ORDER — ORPHENADRINE CITRATE 30 MG/ML
60 INJECTION INTRAMUSCULAR; INTRAVENOUS
Status: COMPLETED | OUTPATIENT
Start: 2020-12-20 | End: 2020-12-20

## 2020-12-20 RX ORDER — ONDANSETRON 4 MG/1
4 TABLET, ORALLY DISINTEGRATING ORAL
Status: COMPLETED | OUTPATIENT
Start: 2020-12-20 | End: 2020-12-20

## 2020-12-20 RX ORDER — PREDNISONE 20 MG/1
40 TABLET ORAL
Status: COMPLETED | OUTPATIENT
Start: 2020-12-20 | End: 2020-12-20

## 2020-12-20 RX ORDER — TIZANIDINE 4 MG/1
4 TABLET ORAL EVERY 6 HOURS PRN
Qty: 21 TABLET | Refills: 0 | Status: SHIPPED | OUTPATIENT
Start: 2020-12-20 | End: 2020-12-30

## 2020-12-20 RX ORDER — METHYLPREDNISOLONE 4 MG/1
TABLET ORAL
Qty: 1 PACKAGE | Refills: 0 | Status: SHIPPED | OUTPATIENT
Start: 2020-12-20 | End: 2021-01-10

## 2020-12-20 RX ADMIN — ONDANSETRON 4 MG: 4 TABLET, ORALLY DISINTEGRATING ORAL at 03:12

## 2020-12-20 RX ADMIN — PREDNISONE 40 MG: 20 TABLET ORAL at 02:12

## 2020-12-20 RX ADMIN — ORPHENADRINE CITRATE 60 MG: 30 INJECTION INTRAMUSCULAR; INTRAVENOUS at 02:12

## 2020-12-20 NOTE — ED NOTES
Pt presents to Ed with c/o back pain for last few years. States the pain is worse today and is radiating down her legs.

## 2020-12-20 NOTE — ED PROVIDER NOTES
Encounter Date: 12/20/2020       History     Chief Complaint   Patient presents with    Back Pain     lower back pain for a while and now it is radiating down to my legs and my feet feel cold. its been getting worse and i cant take this pain anymore. i have an appointment witha pain specialist but i cant waity any longer      55-year-old female presents with chronic low back pain, states worsening.  Seen PCP several weeks ago and was referred to Neurology for NCS/EMG and MRI of L-spine pending results.  States she did not receive an appointment yet feels the pain is worsening, denies bowel bladder incontinence or saddle anesthesia.  History of PVD, appointment with Cardiology tomorrow for arterial ultrasounds of bilateral lower extremities and abdomen.        Review of patient's allergies indicates:   Allergen Reactions    Bentyl [dicyclomine]     Clindamycin     Macrobid [nitrofurantoin monohyd/m-cryst]     Thorazine [chlorpromazine]     Topamax [topiramate]     Toradol [ketorolac]     Tramadol      Past Medical History:   Diagnosis Date    Anxiety     CAD (coronary artery disease), native coronary artery     Current smoker     Depression     HTN (hypertension), benign     Hyperlipidemia     Stroke 01/17/2017     Past Surgical History:   Procedure Laterality Date    abdominal aortic stent      BLADDER SUSPENSION      CHOLECYSTECTOMY      femoral stents      HERNIA REPAIR      HYSTERECTOMY       History reviewed. No pertinent family history.  Social History     Tobacco Use    Smoking status: Current Every Day Smoker     Packs/day: 2.00     Types: Cigarettes    Smokeless tobacco: Never Used   Substance Use Topics    Alcohol use: Yes    Drug use: No     Review of Systems   Constitutional: Negative for fever.   HENT: Negative for sore throat.    Respiratory: Negative for shortness of breath.    Cardiovascular: Negative for chest pain.   Gastrointestinal: Negative for nausea.   Genitourinary:  Negative for difficulty urinating and dysuria.   Musculoskeletal: Positive for back pain.   Skin: Negative for rash.   Neurological: Negative for weakness.   Hematological: Does not bruise/bleed easily.       Physical Exam     Initial Vitals [12/20/20 1422]   BP Pulse Resp Temp SpO2   109/82 80 20 98.1 °F (36.7 °C) 100 %      MAP       --         Physical Exam    Nursing note and vitals reviewed.  Constitutional: She appears well-developed and well-nourished.   HENT:   Head: Normocephalic and atraumatic.   Mouth/Throat: Oropharynx is clear and moist.   Eyes: Conjunctivae are normal.   Neck: Normal range of motion and phonation normal. No stridor present.   Cardiovascular: Normal rate, regular rhythm, S1 normal, S2 normal, normal heart sounds, intact distal pulses and normal pulses.   Pulses:       Dorsalis pedis pulses are 2+ on the right side and 2+ on the left side.        Posterior tibial pulses are 2+ on the right side and 2+ on the left side.   Pulmonary/Chest: Effort normal and breath sounds normal. No accessory muscle usage or stridor. No respiratory distress.   Abdominal: Soft. There is no CVA tenderness.   Musculoskeletal: Normal range of motion.        Arms:    Neurological: She is alert and oriented to person, place, and time. She has normal strength and normal reflexes.   Skin: Skin is warm and dry. Capillary refill takes 2 to 3 seconds.   Psychiatric: She has a normal mood and affect.         ED Course   Procedures  Labs Reviewed - No data to display       Imaging Results    None                                      Clinical Impression:     ICD-10-CM ICD-9-CM   1. Chronic bilateral low back pain with bilateral sciatica  M54.42 724.2    M54.41 724.3    G89.29 338.29                          ED Disposition Condition    Discharge Stable        ED Prescriptions     Medication Sig Dispense Start Date End Date Auth. Provider    methylPREDNISolone (MEDROL DOSEPACK) 4 mg tablet Take as directed 1 Package  12/20/2020 1/10/2021 Anuel Geronimo NP    tiZANidine (ZANAFLEX) 4 MG tablet Take 1 tablet (4 mg total) by mouth every 6 (six) hours as needed. 21 tablet 12/20/2020 12/30/2020 Anuel Geronimo NP        Follow-up Information     Follow up With Specialties Details Why Contact Info    Mario Alcazar Jr., MD Neurology Schedule an appointment as soon as possible for a visit   [address]  [facility] [place] [address]  [phone]                                         Anuel Geronimo NP  12/20/20 7239

## 2021-02-12 NOTE — PROGRESS NOTES
DATE OF CONSULTATION:  05/28/2020    REFERRING Physician:  Sheldon Nj MD    TIME:  The patient was seen at 11:30 on 05/28/2020.    REASON FOR CONSULTATION:  This is a 89-year-old white female with history of hypertension, coronary artery disease, congestive heart failure, COPD, aortic valve stenosis, who is transferred from [facility] for TAVR workup.  The patient is scheduled for TAVR today.  She underwent CT angiogram 2 days ago in preparation for this.  She was on the floor yesterday, but developed respiratory distress and lack of urine output, so she was transferred to the Intensive Care Unit this morning.  The patient is scheduled to go to TAVR this afternoon in the next hour or so.  At the time of evaluation, the patient is awake and alert.  She is resting in bed.  She complains of breathing difficulty.  She is on nasal cannula oxygen.  She denies any headaches or blurred vision.  No dizziness.  No nausea or vomiting at this time, although her appetite is poor.  She denies any chest pain.  She denies any abdominal pain or diarrhea.  No dysuria or hematuria.  She has a Holt catheter inserted with minimal urine output.  Her appetite has been low.  She denies any rash or joint pains.  No other complaints.    PAST MEDICAL HISTORY:  Significant for the above.    PAST SURGICAL HISTORY:  Significant for CABG, cholecystectomy, hysterectomy, knee surgery, back surgery, and tonsillectomy.    SOCIAL HISTORY:  She quit tobacco use in the 1980s.  No alcohol or illicit drug use.    FAMILY HISTORY:  Unknown.    ALLERGIES:  SHE IS ALLERGIC TO MORPHINE AND SULFA DRUGS.    MEDICATIONS:  At home include Tylenol, albuterol, alprazolam, amlodipine, aspirin, Symbicort, cholecalciferol, cilostazol, cranberry, Synthroid, nitrofurantoin, omeprazole, and pravastatin.    PHYSICAL EXAMINATION:    GENERAL:  The patient is resting in bed.  She has some mild dyspnea.     VITAL SIGNS:  She is afebrile with heart rate in  ICU Progress Note  Neurocritical Care    Admit Date: 1/17/2017  LOS: 2    CC: Acute right MCA stroke    Code Status: Full Code     SUBJECTIVE:     Interval History/Significant Events:     No significant events overnight.     Medications:  Continuous Infusions:   sodium chloride 0.9% 75 mL/hr at 01/19/17 0600     Scheduled Meds:   aspirin  325 mg Oral Daily    atorvastatin  40 mg Oral Daily    famotidine  20 mg Oral BID    heparin (porcine)  5,000 Units Subcutaneous Q8H    senna-docusate 8.6-50 mg  1 tablet Oral BID    sodium chloride 0.9%  3 mL Intravenous Q8H     PRN Meds:.acetaminophen, dextrose 50%, glucagon (human recombinant), insulin aspart, labetalol, ondansetron    OBJECTIVE:   Vital Signs (Most Recent):   Temp: 97.9 °F (36.6 °C) (01/19/17 0705)  Pulse: 67 (01/19/17 0805)  Resp: 14 (01/19/17 0805)  BP: 123/77 (01/19/17 0805)  SpO2: 100 % (01/19/17 0805)    Vital Signs (24h Range):   Temp:  [97.9 °F (36.6 °C)-98.3 °F (36.8 °C)] 97.9 °F (36.6 °C)  Pulse:  [49-79] 67  Resp:  [13-42] 14  SpO2:  [95 %-100 %] 100 %  BP: (104-166)/(62-96) 123/77    ICP/CPP (Last 24h):        I & O (Last 24h):   Intake/Output Summary (Last 24 hours) at 01/19/17 0834  Last data filed at 01/19/17 0600   Gross per 24 hour   Intake          2178.75 ml   Output                0 ml   Net          2178.75 ml     Physical Exam:  AOx3, NAD  No jaundice  Lungs are clear to auscultation, good air entry bilateral  Normal S1/S2, no murmurs.  Abdomen is soft, lax, +ve BS.  +2 pulse in DP and PT bilateral.  No peripheral edema.    Neuro:  AOx3. Follow full commands  PERRL, EOMI  CN ii-xii are intact.  Motor: RUE 5/5  RLE 5/5  LUE 4+/5 LLE 4+/5  Diminished LT sensation on right face, arm and leg   FTN with no dysmetria or ataxia bilateral          Labs:  ABG: No results for input(s): PH, PO2, PCO2, HCO3, POCSATURATED, BE in the last 24 hours.  BMP:  Recent Labs  Lab 01/19/17  0212      K 4.0      CO2 25   BUN 10   CREATININE 0.8    GLU 98   MG 2.0   PHOS 3.6     LFT: Lab Results   Component Value Date    AST 13 01/19/2017    ALT 35 01/19/2017    ALKPHOS 51 (L) 01/19/2017    BILITOT 0.2 01/19/2017    ALBUMIN 3.0 (L) 01/19/2017    PROT 5.7 (L) 01/19/2017     CBC:   Lab Results   Component Value Date    WBC 4.12 01/19/2017    HGB 12.3 01/19/2017    HCT 36.4 (L) 01/19/2017    MCV 90 01/19/2017     (L) 01/19/2017     Microbiology x 7d:   Microbiology Results (last 7 days)     ** No results found for the last 168 hours. **        Imaging:  MRI brain showed no signs of acute infarction   I personally reviewed the above image.    ASSESSMENT/PLAN:     1. Possible RMCA ischemic stroke vs. Everted stroke vs stroke mimic s/p tPA  2. HLP  3. HTN.    A/P:  - Neurological exam unchanged  - Continue secondary stroke prevention measures.  - Consider getting an EEG vs repeating MRI brain if symptoms continue  - PT/OT/SLP  - Tolerating regular diet  - SQH  - Transfer to SDU.    Care level 3.      the 60s, respiratory rate is 30, blood pressure is 102/53.  I's and O's are 300 in, 350 out.     HEENT:  No frontomaxillary sinus tenderness.  No nasal congestion.  No posterior pharyngeal wall erythema or exudate.     NECK:  Supple.  No JVP elevation.  Good carotid upstroke and volume.     HEART:  S1, S2.  No gallops or rubs.     LUNGS:  Reveal bibasilar crackles.  No wheezes.  Good chest expansion.     ABDOMEN:  Soft, nontender, and nondistended.  Bowel sounds are present.  No bruits in all quadrants.  No hepatosplenomegaly.  No bladder distention per percussion.     EXTREMITIES:  Reveal no edema x4.     SKIN:  Reveals no rash.     JOINTS:  Reveal no active synovitis.     NEUROLOGICAL:  Nonfocal.    LABORATORY DATA:  This morning; sodium 138, potassium 5.4, chloride 102, bicarbonate 21, BUN 20, creatinine 1.52.  Yesterday morning; creatinine was 0.68 with potassium of 3.  White count 16.4, H and H 11.9 and 39, platelet count 269.  There is no peripheral eosinophilia.  Chest x-ray shows effusions.    IMPRESSION:  This is a 89-year-old white female with history of hypertension, coronary artery disease, congestive heart failure, aortic valve stenosis, who is admitted with workup for TAVR.  This is being scheduled for later today.  Additionally now:  1. She has acute kidney injury.  This is most likely secondary to ischemic acute tubular necrosis versus contrast nephropathy.  Obstructive uropathy is less likely.  Cell lysis is possible.  2. Hyperkalemia is most likely secondary to advanced acute kidney injury in the setting of potassium supplement.  3. Volume status.  The patient likely has some pulmonary congestion, but no peripheral expansion.  4. Hypertension is okay.  5. Aortic valve stenosis.    PLAN:  At this time:  1. We will defer to other services regarding TAVR.  2. Unable to give IV fluids due to present pulmonary congestion.  3. Status post potassium correction.  4. Diuretics on board per  Cardiology.  5. If aggressive measures are to be continued then I suspect that the patient will require dialysis as I doubt that renal function will improve shortly.  The patient is likely in acute kidney injury from ischemic acute tubular necrosis from congestive heart failure, as well as contrast nephropathy.  There will be more contrast infusion for TAVR.  Therefore, I suspect the patient will require dialysis postoperatively either later today or tomorrow.  This is discussed with patient, staff, and Cardiology at length.  All questions were answered in detail.   Thank you very much for the consultation.        ______________________________  Jose F Goel MD  [phone]      D:  05/28/2020 13:56:47  T:  05/28/2020 16:03:02   /modl   Job:  [id number]              Electronically Signed On 05.30.2020 15:21  ___________________________________________________   Jose F Goel MD

## 2021-03-16 ENCOUNTER — HOSPITAL ENCOUNTER (EMERGENCY)
Facility: HOSPITAL | Age: 56
Discharge: HOME | End: 2021-03-16
Attending: EMERGENCY MEDICINE
Payer: MEDICAID

## 2021-03-16 VITALS
BODY MASS INDEX: 23.37 KG/M2 | OXYGEN SATURATION: 99 % | SYSTOLIC BLOOD PRESSURE: 111 MMHG | HEIGHT: 62 IN | TEMPERATURE: 98 F | WEIGHT: 127 LBS | RESPIRATION RATE: 17 BRPM | HEART RATE: 66 BPM | DIASTOLIC BLOOD PRESSURE: 64 MMHG

## 2021-03-16 DIAGNOSIS — R10.9 LEFT FLANK PAIN: Primary | ICD-10-CM

## 2021-03-16 DIAGNOSIS — R05.9 COUGH: ICD-10-CM

## 2021-03-16 LAB
ALBUMIN SERPL BCP-MCNC: 4 G/DL (ref 3.5–5.2)
ALP SERPL-CCNC: 60 U/L (ref 55–135)
ALT SERPL W/O P-5'-P-CCNC: 29 U/L (ref 10–44)
ANION GAP SERPL CALC-SCNC: 4 MMOL/L (ref 8–16)
AST SERPL-CCNC: 16 U/L (ref 10–40)
BASOPHILS # BLD AUTO: 0.06 K/UL (ref 0–0.2)
BASOPHILS NFR BLD: 1 % (ref 0–1.9)
BILIRUB SERPL-MCNC: 0.3 MG/DL (ref 0.1–1)
BUN SERPL-MCNC: 15 MG/DL (ref 6–20)
CALCIUM SERPL-MCNC: 8.8 MG/DL (ref 8.7–10.5)
CHLORIDE SERPL-SCNC: 104 MMOL/L (ref 95–110)
CO2 SERPL-SCNC: 33 MMOL/L (ref 23–29)
CREAT SERPL-MCNC: 0.8 MG/DL (ref 0.5–1.4)
DIFFERENTIAL METHOD: ABNORMAL
EOSINOPHIL # BLD AUTO: 0.1 K/UL (ref 0–0.5)
EOSINOPHIL NFR BLD: 2.4 % (ref 0–8)
ERYTHROCYTE [DISTWIDTH] IN BLOOD BY AUTOMATED COUNT: 13.4 % (ref 11.5–14.5)
EST. GFR  (AFRICAN AMERICAN): >60 ML/MIN/1.73 M^2
EST. GFR  (NON AFRICAN AMERICAN): >60 ML/MIN/1.73 M^2
GLUCOSE SERPL-MCNC: 98 MG/DL (ref 70–110)
HCT VFR BLD AUTO: 39.4 % (ref 37–48.5)
HGB BLD-MCNC: 13.2 G/DL (ref 12–16)
IMM GRANULOCYTES # BLD AUTO: 0.01 K/UL (ref 0–0.04)
IMM GRANULOCYTES NFR BLD AUTO: 0.2 % (ref 0–0.5)
LYMPHOCYTES # BLD AUTO: 1.7 K/UL (ref 1–4.8)
LYMPHOCYTES NFR BLD: 30.1 % (ref 18–48)
MCH RBC QN AUTO: 31.1 PG (ref 27–31)
MCHC RBC AUTO-ENTMCNC: 33.5 G/DL (ref 32–36)
MCV RBC AUTO: 93 FL (ref 82–98)
MONOCYTES # BLD AUTO: 0.4 K/UL (ref 0.3–1)
MONOCYTES NFR BLD: 7.5 % (ref 4–15)
NEUTROPHILS # BLD AUTO: 3.4 K/UL (ref 1.8–7.7)
NEUTROPHILS NFR BLD: 58.8 % (ref 38–73)
NRBC BLD-RTO: 0 /100 WBC
PLATELET # BLD AUTO: 239 K/UL (ref 150–350)
PMV BLD AUTO: 10.5 FL (ref 9.2–12.9)
POTASSIUM SERPL-SCNC: 3.4 MMOL/L (ref 3.5–5.1)
PROT SERPL-MCNC: 7.2 G/DL (ref 6–8.4)
RBC # BLD AUTO: 4.25 M/UL (ref 4–5.4)
SODIUM SERPL-SCNC: 141 MMOL/L (ref 136–145)
WBC # BLD AUTO: 5.72 K/UL (ref 3.9–12.7)

## 2021-03-16 PROCEDURE — 96372 THER/PROPH/DIAG INJ SC/IM: CPT

## 2021-03-16 PROCEDURE — 99285 EMERGENCY DEPT VISIT HI MDM: CPT | Mod: 25

## 2021-03-16 PROCEDURE — 63600175 PHARM REV CODE 636 W HCPCS: Performed by: EMERGENCY MEDICINE

## 2021-03-16 PROCEDURE — 93010 EKG 12-LEAD: ICD-10-PCS | Mod: ,,, | Performed by: INTERNAL MEDICINE

## 2021-03-16 PROCEDURE — 36415 COLL VENOUS BLD VENIPUNCTURE: CPT | Performed by: EMERGENCY MEDICINE

## 2021-03-16 PROCEDURE — 93010 ELECTROCARDIOGRAM REPORT: CPT | Mod: ,,, | Performed by: INTERNAL MEDICINE

## 2021-03-16 PROCEDURE — 80053 COMPREHEN METABOLIC PANEL: CPT | Performed by: EMERGENCY MEDICINE

## 2021-03-16 PROCEDURE — 36000 PLACE NEEDLE IN VEIN: CPT

## 2021-03-16 PROCEDURE — 93005 ELECTROCARDIOGRAM TRACING: CPT

## 2021-03-16 PROCEDURE — 85025 COMPLETE CBC W/AUTO DIFF WBC: CPT | Performed by: EMERGENCY MEDICINE

## 2021-03-16 PROCEDURE — 25000003 PHARM REV CODE 250: Performed by: EMERGENCY MEDICINE

## 2021-03-16 RX ORDER — GABAPENTIN 300 MG/1
300 CAPSULE ORAL 3 TIMES DAILY
COMMUNITY

## 2021-03-16 RX ORDER — HYDROCODONE BITARTRATE AND ACETAMINOPHEN 5; 325 MG/1; MG/1
1 TABLET ORAL
Status: COMPLETED | OUTPATIENT
Start: 2021-03-16 | End: 2021-03-16

## 2021-03-16 RX ORDER — LORATADINE 10 MG/1
10 TABLET ORAL DAILY
COMMUNITY

## 2021-03-16 RX ORDER — FUROSEMIDE 20 MG/1
TABLET ORAL DAILY PRN
COMMUNITY

## 2021-03-16 RX ORDER — TIZANIDINE HYDROCHLORIDE 2 MG/1
CAPSULE, GELATIN COATED ORAL 3 TIMES DAILY
COMMUNITY

## 2021-03-16 RX ORDER — ALBUTEROL SULFATE 90 UG/1
1-2 AEROSOL, METERED RESPIRATORY (INHALATION) EVERY 6 HOURS PRN
Qty: 18 G | Refills: 0 | Status: SHIPPED | OUTPATIENT
Start: 2021-03-16 | End: 2022-07-10 | Stop reason: SDUPTHER

## 2021-03-16 RX ORDER — HYDROMORPHONE HYDROCHLORIDE 1 MG/ML
1 INJECTION, SOLUTION INTRAMUSCULAR; INTRAVENOUS; SUBCUTANEOUS
Status: COMPLETED | OUTPATIENT
Start: 2021-03-16 | End: 2021-03-16

## 2021-03-16 RX ORDER — LEVOCETIRIZINE DIHYDROCHLORIDE 5 MG/1
5 TABLET, FILM COATED ORAL NIGHTLY
COMMUNITY

## 2021-03-16 RX ORDER — HYDROCODONE BITARTRATE AND ACETAMINOPHEN 5; 325 MG/1; MG/1
1 TABLET ORAL EVERY 6 HOURS PRN
Qty: 12 TABLET | Refills: 0 | Status: SHIPPED | OUTPATIENT
Start: 2021-03-16

## 2021-03-16 RX ORDER — ONDANSETRON 4 MG/1
4 TABLET, ORALLY DISINTEGRATING ORAL EVERY 8 HOURS PRN
Qty: 20 TABLET | Refills: 0 | Status: SHIPPED | OUTPATIENT
Start: 2021-03-16

## 2021-03-16 RX ORDER — METHYLPREDNISOLONE 4 MG/1
TABLET ORAL
Qty: 1 PACKAGE | Refills: 0 | Status: SHIPPED | OUTPATIENT
Start: 2021-03-16 | End: 2021-04-06

## 2021-03-16 RX ADMIN — HYDROMORPHONE HYDROCHLORIDE 1 MG: 1 INJECTION, SOLUTION INTRAMUSCULAR; INTRAVENOUS; SUBCUTANEOUS at 10:03

## 2021-03-16 RX ADMIN — HYDROCODONE BITARTRATE AND ACETAMINOPHEN 1 TABLET: 5; 325 TABLET ORAL at 09:03

## 2021-04-12 ENCOUNTER — NURSE TRIAGE (OUTPATIENT)
Dept: ADMINISTRATIVE | Facility: CLINIC | Age: 56
End: 2021-04-12

## 2021-04-13 ENCOUNTER — HOSPITAL ENCOUNTER (EMERGENCY)
Facility: HOSPITAL | Age: 56
Discharge: HOME | End: 2021-04-13
Attending: EMERGENCY MEDICINE
Payer: MEDICAID

## 2021-04-13 VITALS
BODY MASS INDEX: 23.04 KG/M2 | RESPIRATION RATE: 18 BRPM | HEART RATE: 66 BPM | OXYGEN SATURATION: 98 % | DIASTOLIC BLOOD PRESSURE: 84 MMHG | TEMPERATURE: 98 F | HEIGHT: 63 IN | WEIGHT: 130 LBS | SYSTOLIC BLOOD PRESSURE: 144 MMHG

## 2021-04-13 DIAGNOSIS — R07.9 CHEST PAIN: ICD-10-CM

## 2021-04-13 DIAGNOSIS — R42 VERTIGO: Primary | ICD-10-CM

## 2021-04-13 LAB
ALBUMIN SERPL BCP-MCNC: 3.5 G/DL (ref 3.5–5.2)
ALP SERPL-CCNC: 59 U/L (ref 55–135)
ALT SERPL W/O P-5'-P-CCNC: 22 U/L (ref 10–44)
ANION GAP SERPL CALC-SCNC: 2 MMOL/L (ref 8–16)
AST SERPL-CCNC: 11 U/L (ref 10–40)
BASOPHILS # BLD AUTO: 0.06 K/UL (ref 0–0.2)
BASOPHILS NFR BLD: 1.3 % (ref 0–1.9)
BILIRUB SERPL-MCNC: 0.3 MG/DL (ref 0.1–1)
BUN SERPL-MCNC: 16 MG/DL (ref 6–20)
CALCIUM SERPL-MCNC: 8.4 MG/DL (ref 8.7–10.5)
CHLORIDE SERPL-SCNC: 112 MMOL/L (ref 95–110)
CO2 SERPL-SCNC: 28 MMOL/L (ref 23–29)
CREAT SERPL-MCNC: 0.8 MG/DL (ref 0.5–1.4)
DIFFERENTIAL METHOD: NORMAL
EOSINOPHIL # BLD AUTO: 0.1 K/UL (ref 0–0.5)
EOSINOPHIL NFR BLD: 2.2 % (ref 0–8)
ERYTHROCYTE [DISTWIDTH] IN BLOOD BY AUTOMATED COUNT: 13.2 % (ref 11.5–14.5)
EST. GFR  (AFRICAN AMERICAN): >60 ML/MIN/1.73 M^2
EST. GFR  (NON AFRICAN AMERICAN): >60 ML/MIN/1.73 M^2
GLUCOSE SERPL-MCNC: 80 MG/DL (ref 70–110)
HCT VFR BLD AUTO: 40.3 % (ref 37–48.5)
HGB BLD-MCNC: 13.4 G/DL (ref 12–16)
IMM GRANULOCYTES # BLD AUTO: 0.01 K/UL (ref 0–0.04)
IMM GRANULOCYTES NFR BLD AUTO: 0.2 % (ref 0–0.5)
LYMPHOCYTES # BLD AUTO: 1.7 K/UL (ref 1–4.8)
LYMPHOCYTES NFR BLD: 36.4 % (ref 18–48)
MCH RBC QN AUTO: 30.8 PG (ref 27–31)
MCHC RBC AUTO-ENTMCNC: 33.3 G/DL (ref 32–36)
MCV RBC AUTO: 93 FL (ref 82–98)
MONOCYTES # BLD AUTO: 0.4 K/UL (ref 0.3–1)
MONOCYTES NFR BLD: 7.5 % (ref 4–15)
NEUTROPHILS # BLD AUTO: 2.4 K/UL (ref 1.8–7.7)
NEUTROPHILS NFR BLD: 52.4 % (ref 38–73)
NRBC BLD-RTO: 0 /100 WBC
PLATELET # BLD AUTO: 199 K/UL (ref 150–450)
PMV BLD AUTO: 10.5 FL (ref 9.2–12.9)
POTASSIUM SERPL-SCNC: 3.6 MMOL/L (ref 3.5–5.1)
PROT SERPL-MCNC: 7 G/DL (ref 6–8.4)
RBC # BLD AUTO: 4.35 M/UL (ref 4–5.4)
SODIUM SERPL-SCNC: 142 MMOL/L (ref 136–145)
TROPONIN I SERPL DL<=0.01 NG/ML-MCNC: <0.02 NG/ML (ref 0–0.03)
WBC # BLD AUTO: 4.64 K/UL (ref 3.9–12.7)

## 2021-04-13 PROCEDURE — 93010 EKG 12-LEAD: ICD-10-PCS | Mod: ,,, | Performed by: INTERNAL MEDICINE

## 2021-04-13 PROCEDURE — 85025 COMPLETE CBC W/AUTO DIFF WBC: CPT | Performed by: EMERGENCY MEDICINE

## 2021-04-13 PROCEDURE — 25000003 PHARM REV CODE 250: Performed by: EMERGENCY MEDICINE

## 2021-04-13 PROCEDURE — 84484 ASSAY OF TROPONIN QUANT: CPT | Performed by: EMERGENCY MEDICINE

## 2021-04-13 PROCEDURE — 36415 COLL VENOUS BLD VENIPUNCTURE: CPT | Performed by: EMERGENCY MEDICINE

## 2021-04-13 PROCEDURE — 93010 ELECTROCARDIOGRAM REPORT: CPT | Mod: ,,, | Performed by: INTERNAL MEDICINE

## 2021-04-13 PROCEDURE — 80053 COMPREHEN METABOLIC PANEL: CPT | Performed by: EMERGENCY MEDICINE

## 2021-04-13 PROCEDURE — 63600175 PHARM REV CODE 636 W HCPCS: Performed by: EMERGENCY MEDICINE

## 2021-04-13 PROCEDURE — 99285 EMERGENCY DEPT VISIT HI MDM: CPT | Mod: 25

## 2021-04-13 PROCEDURE — 96374 THER/PROPH/DIAG INJ IV PUSH: CPT

## 2021-04-13 PROCEDURE — 93005 ELECTROCARDIOGRAM TRACING: CPT

## 2021-04-13 RX ORDER — MECLIZINE HYDROCHLORIDE 25 MG/1
25 TABLET ORAL 3 TIMES DAILY PRN
Qty: 20 TABLET | Refills: 0 | Status: SHIPPED | OUTPATIENT
Start: 2021-04-13 | End: 2021-06-10 | Stop reason: SDUPTHER

## 2021-04-13 RX ORDER — FAMOTIDINE 20 MG/1
20 TABLET, FILM COATED ORAL 2 TIMES DAILY
COMMUNITY

## 2021-04-13 RX ORDER — ONDANSETRON 4 MG/1
4 TABLET, FILM COATED ORAL EVERY 6 HOURS
Qty: 12 TABLET | Refills: 0 | Status: SHIPPED | OUTPATIENT
Start: 2021-04-13

## 2021-04-13 RX ORDER — MAG HYDROX/ALUMINUM HYD/SIMETH 200-200-20
30 SUSPENSION, ORAL (FINAL DOSE FORM) ORAL
Status: COMPLETED | OUTPATIENT
Start: 2021-04-13 | End: 2021-04-13

## 2021-04-13 RX ORDER — MONTELUKAST SODIUM 10 MG/1
10 TABLET ORAL NIGHTLY
COMMUNITY

## 2021-04-13 RX ORDER — ONDANSETRON 2 MG/ML
4 INJECTION INTRAMUSCULAR; INTRAVENOUS
Status: COMPLETED | OUTPATIENT
Start: 2021-04-13 | End: 2021-04-13

## 2021-04-13 RX ORDER — MECLIZINE HYDROCHLORIDE 25 MG/1
25 TABLET ORAL
Status: COMPLETED | OUTPATIENT
Start: 2021-04-13 | End: 2021-04-13

## 2021-04-13 RX ADMIN — MECLIZINE HYDROCHLORIDE 25 MG: 25 TABLET ORAL at 01:04

## 2021-04-13 RX ADMIN — ALUMINUM HYDROXIDE, MAGNESIUM HYDROXIDE, AND SIMETHICONE 30 ML: 200; 200; 20 SUSPENSION ORAL at 01:04

## 2021-04-13 RX ADMIN — ONDANSETRON 4 MG: 2 INJECTION INTRAMUSCULAR; INTRAVENOUS at 01:04

## 2021-05-30 ENCOUNTER — HOSPITAL ENCOUNTER (EMERGENCY)
Facility: HOSPITAL | Age: 56
Discharge: HOME | End: 2021-05-30
Attending: EMERGENCY MEDICINE
Payer: MEDICAID

## 2021-05-30 VITALS
DIASTOLIC BLOOD PRESSURE: 70 MMHG | HEIGHT: 63 IN | BODY MASS INDEX: 23.57 KG/M2 | OXYGEN SATURATION: 98 % | SYSTOLIC BLOOD PRESSURE: 104 MMHG | HEART RATE: 81 BPM | WEIGHT: 133 LBS | TEMPERATURE: 99 F | RESPIRATION RATE: 16 BRPM

## 2021-05-30 DIAGNOSIS — Z13.9 ENCOUNTER FOR MEDICAL SCREENING EXAMINATION: ICD-10-CM

## 2021-05-30 DIAGNOSIS — G89.29 CHRONIC LEFT SHOULDER PAIN: Primary | ICD-10-CM

## 2021-05-30 DIAGNOSIS — M25.512 CHRONIC LEFT SHOULDER PAIN: Primary | ICD-10-CM

## 2021-05-30 LAB
CTP QC/QA: YES
SARS-COV-2 RDRP RESP QL NAA+PROBE: NEGATIVE

## 2021-05-30 PROCEDURE — 99283 EMERGENCY DEPT VISIT LOW MDM: CPT | Mod: 25

## 2021-05-30 PROCEDURE — U0002 COVID-19 LAB TEST NON-CDC: HCPCS | Performed by: CLINICAL NURSE SPECIALIST

## 2021-06-09 ENCOUNTER — NURSE TRIAGE (OUTPATIENT)
Dept: ADMINISTRATIVE | Facility: CLINIC | Age: 56
End: 2021-06-09

## 2021-06-10 ENCOUNTER — HOSPITAL ENCOUNTER (EMERGENCY)
Facility: HOSPITAL | Age: 56
Discharge: HOME | End: 2021-06-10
Attending: EMERGENCY MEDICINE
Payer: MEDICAID

## 2021-06-10 VITALS
HEIGHT: 62 IN | BODY MASS INDEX: 24.54 KG/M2 | RESPIRATION RATE: 16 BRPM | DIASTOLIC BLOOD PRESSURE: 66 MMHG | HEART RATE: 71 BPM | OXYGEN SATURATION: 96 % | SYSTOLIC BLOOD PRESSURE: 96 MMHG | TEMPERATURE: 98 F | WEIGHT: 133.38 LBS

## 2021-06-10 DIAGNOSIS — R42 DIZZINESS: Primary | ICD-10-CM

## 2021-06-10 LAB
ALBUMIN SERPL BCP-MCNC: 3.7 G/DL (ref 3.5–5.2)
ALP SERPL-CCNC: 56 U/L (ref 55–135)
ALT SERPL W/O P-5'-P-CCNC: 27 U/L (ref 10–44)
ANION GAP SERPL CALC-SCNC: 9 MMOL/L (ref 8–16)
AST SERPL-CCNC: 15 U/L (ref 10–40)
BASOPHILS # BLD AUTO: 0.05 K/UL (ref 0–0.2)
BASOPHILS NFR BLD: 0.8 % (ref 0–1.9)
BILIRUB SERPL-MCNC: 0.4 MG/DL (ref 0.1–1)
BUN SERPL-MCNC: 13 MG/DL (ref 6–20)
CALCIUM SERPL-MCNC: 9.1 MG/DL (ref 8.7–10.5)
CHLORIDE SERPL-SCNC: 107 MMOL/L (ref 95–110)
CO2 SERPL-SCNC: 27 MMOL/L (ref 23–29)
CREAT SERPL-MCNC: 0.7 MG/DL (ref 0.5–1.4)
DIFFERENTIAL METHOD: NORMAL
EOSINOPHIL # BLD AUTO: 0.1 K/UL (ref 0–0.5)
EOSINOPHIL NFR BLD: 1.6 % (ref 0–8)
ERYTHROCYTE [DISTWIDTH] IN BLOOD BY AUTOMATED COUNT: 13.7 % (ref 11.5–14.5)
EST. GFR  (AFRICAN AMERICAN): >60 ML/MIN/1.73 M^2
EST. GFR  (NON AFRICAN AMERICAN): >60 ML/MIN/1.73 M^2
GLUCOSE SERPL-MCNC: 145 MG/DL (ref 70–110)
HCT VFR BLD AUTO: 38.7 % (ref 37–48.5)
HGB BLD-MCNC: 13 G/DL (ref 12–16)
IMM GRANULOCYTES # BLD AUTO: 0.02 K/UL (ref 0–0.04)
IMM GRANULOCYTES NFR BLD AUTO: 0.3 % (ref 0–0.5)
LYMPHOCYTES # BLD AUTO: 1.2 K/UL (ref 1–4.8)
LYMPHOCYTES NFR BLD: 19.3 % (ref 18–48)
MCH RBC QN AUTO: 30.4 PG (ref 27–31)
MCHC RBC AUTO-ENTMCNC: 33.6 G/DL (ref 32–36)
MCV RBC AUTO: 90 FL (ref 82–98)
MONOCYTES # BLD AUTO: 0.3 K/UL (ref 0.3–1)
MONOCYTES NFR BLD: 5.2 % (ref 4–15)
NEUTROPHILS # BLD AUTO: 4.5 K/UL (ref 1.8–7.7)
NEUTROPHILS NFR BLD: 72.8 % (ref 38–73)
NRBC BLD-RTO: 0 /100 WBC
PLATELET # BLD AUTO: 197 K/UL (ref 150–450)
PMV BLD AUTO: 10.7 FL (ref 9.2–12.9)
POTASSIUM SERPL-SCNC: 4 MMOL/L (ref 3.5–5.1)
PROT SERPL-MCNC: 6.9 G/DL (ref 6–8.4)
RBC # BLD AUTO: 4.28 M/UL (ref 4–5.4)
SODIUM SERPL-SCNC: 143 MMOL/L (ref 136–145)
WBC # BLD AUTO: 6.18 K/UL (ref 3.9–12.7)

## 2021-06-10 PROCEDURE — 99283 EMERGENCY DEPT VISIT LOW MDM: CPT

## 2021-06-10 PROCEDURE — 80053 COMPREHEN METABOLIC PANEL: CPT | Performed by: CLINICAL NURSE SPECIALIST

## 2021-06-10 PROCEDURE — 85025 COMPLETE CBC W/AUTO DIFF WBC: CPT | Performed by: CLINICAL NURSE SPECIALIST

## 2021-06-10 PROCEDURE — 36415 COLL VENOUS BLD VENIPUNCTURE: CPT | Performed by: CLINICAL NURSE SPECIALIST

## 2021-06-10 RX ORDER — MECLIZINE HYDROCHLORIDE 25 MG/1
25 TABLET ORAL 3 TIMES DAILY PRN
Qty: 20 TABLET | Refills: 0 | Status: SHIPPED | OUTPATIENT
Start: 2021-06-10

## 2021-06-10 RX ORDER — OLMESARTAN MEDOXOMIL 5 MG/1
TABLET ORAL DAILY
COMMUNITY

## 2021-07-01 ENCOUNTER — PATIENT MESSAGE (OUTPATIENT)
Dept: ADMINISTRATIVE | Facility: OTHER | Age: 56
End: 2021-07-01

## 2021-07-08 ENCOUNTER — HOSPITAL ENCOUNTER (EMERGENCY)
Facility: HOSPITAL | Age: 56
Discharge: HOME | End: 2021-07-08
Attending: FAMILY MEDICINE
Payer: MEDICAID

## 2021-07-08 VITALS
DIASTOLIC BLOOD PRESSURE: 69 MMHG | RESPIRATION RATE: 16 BRPM | TEMPERATURE: 98 F | SYSTOLIC BLOOD PRESSURE: 116 MMHG | OXYGEN SATURATION: 100 % | HEART RATE: 67 BPM

## 2021-07-08 DIAGNOSIS — S20.419A ABRASION OF BACK, UNSPECIFIED LATERALITY, INITIAL ENCOUNTER: ICD-10-CM

## 2021-07-08 DIAGNOSIS — S90.511A ABRASION OF RIGHT ANKLE, INITIAL ENCOUNTER: Primary | ICD-10-CM

## 2021-07-08 DIAGNOSIS — S39.012A STRAIN OF LUMBAR REGION, INITIAL ENCOUNTER: ICD-10-CM

## 2021-07-08 PROCEDURE — 63600175 PHARM REV CODE 636 W HCPCS: Performed by: FAMILY MEDICINE

## 2021-07-08 PROCEDURE — 90471 IMMUNIZATION ADMIN: CPT | Performed by: FAMILY MEDICINE

## 2021-07-08 PROCEDURE — 90715 TDAP VACCINE 7 YRS/> IM: CPT | Performed by: FAMILY MEDICINE

## 2021-07-08 PROCEDURE — 25000003 PHARM REV CODE 250: Performed by: FAMILY MEDICINE

## 2021-07-08 PROCEDURE — 99284 EMERGENCY DEPT VISIT MOD MDM: CPT | Mod: 25

## 2021-07-08 RX ORDER — NAPROXEN 500 MG/1
500 TABLET ORAL 2 TIMES DAILY WITH MEALS
Qty: 60 TABLET | Refills: 0 | Status: SHIPPED | OUTPATIENT
Start: 2021-07-08 | End: 2022-09-19 | Stop reason: SDUPTHER

## 2021-07-08 RX ORDER — HYDROCODONE BITARTRATE AND ACETAMINOPHEN 5; 325 MG/1; MG/1
1 TABLET ORAL
Status: COMPLETED | OUTPATIENT
Start: 2021-07-08 | End: 2021-07-08

## 2021-07-08 RX ADMIN — HYDROCODONE BITARTRATE AND ACETAMINOPHEN 1 TABLET: 5; 325 TABLET ORAL at 04:07

## 2021-07-08 RX ADMIN — TETANUS TOXOID, REDUCED DIPHTHERIA TOXOID AND ACELLULAR PERTUSSIS VACCINE, ADSORBED 0.5 ML: 5; 2.5; 8; 8; 2.5 SUSPENSION INTRAMUSCULAR at 04:07

## 2021-07-09 ENCOUNTER — PATIENT OUTREACH (OUTPATIENT)
Dept: EMERGENCY MEDICINE | Facility: HOSPITAL | Age: 56
End: 2021-07-09

## 2021-10-06 DIAGNOSIS — Z87.891 HISTORY OF SMOKING 30 OR MORE PACK YEARS: ICD-10-CM

## 2021-10-06 DIAGNOSIS — Z12.2 ENCOUNTER FOR SCREENING FOR LUNG CANCER: Primary | ICD-10-CM

## 2021-10-11 NOTE — PT/OT/SLP DISCHARGE
Chief complaint:   Chief Complaint   Patient presents with   • Sore Throat     started today        Vitals:  Visit Vitals  /65   Pulse 88   Temp 98.5 °F (36.9 °C)   Resp 16   Ht 5' 8\" (1.727 m)   Wt 103 kg (227 lb)   LMP 10/06/2021   SpO2 98%   BMI 34.52 kg/m²       HISTORY OF PRESENT ILLNESS     23-year-old female presents to Urgent Care complaining of sore throat that began today.  She also has runny nose.  No fever.  No difficulty swallowing.      Other significant problems:  Patient Active Problem List    Diagnosis Date Noted   • Anxiety 04/21/2021     Priority: Low   • PCOS (polycystic ovarian syndrome) 10/20/2019     Priority: Low   • Insulin resistance 10/20/2019     Priority: Low   • Hirsutism 10/20/2019     Priority: Low   • Acne vulgaris 06/12/2013     Priority: Low       PAST MEDICAL, FAMILY AND SOCIAL HISTORY     Medications:  Current Outpatient Medications   Medication   • sertraline (ZOLOFT) 50 MG tablet   • hydrOXYzine (ATARAX) 25 MG tablet   • Lisdexamfetamine Dimesylate (Vyvanse) 10 MG Cap   • Loperamide HCl (IMODIUM A-D PO)   • ALPRAZolam (XANAX) 0.5 MG tablet   • dicyclomine (Bentyl) 10 MG capsule   • ondansetron (Zofran ODT) 4 MG disintegrating tablet   • VITAMIN D PO   • spironolactone (ALDACTONE) 100 MG tablet   • metFORMIN (GLUCOPHAGE) 500 MG tablet   • norethindrone-ethinyl estradiol (Loestrin 1/20, 21,) 1-20 MG-MCG per tablet     No current facility-administered medications for this visit.       Allergies:  ALLERGIES:   Allergen Reactions   • Oseltamivir Other (See Comments)     Dizzy and bloody stools   • Amoxicillin Other (See Comments)     No allergy, just doesn't work   • Gluten Meal   (Food Or Med) RASH   • Latex   (Environmental) RASH   • Tamiflu Other (See Comments)     Dizzy. Blood in the stool one time.       Past Medical  History/Surgeries:  Past Medical History:   Diagnosis Date   • Acne    • Anxiety    • Depression 11/19/2015   • Palpitations    • Paronychia of great toe,  Occupational Therapy Discharge Summary    Shun Tidwell  MRN: [id number]   Acute right MCA stroke   Patient Discharged from acute Occupational Therapy on 1/21/17.  Please refer to prior OT note dated on 1/19/17 for functional status.     Assessment:   Patient has not met goals.  GOALS:   Occupational Therapy Goals     Not on file      Multidisciplinary Problems (Resolved)        Problem: Occupational Therapy Goal    Goal Priority Disciplines Outcome Interventions   Occupational Therapy Goal   (Resolved)     OT, PT/OT Outcome(s) achieved    Description:  Goals to be met by: 1/25     Patient will increase functional independence with ADLs by performing:    UE Dressing with Supervision. - not met  LE Dressing with Supervision. - not met  Grooming while standing with Supervision. - not met  Toileting from toilet with Supervision for hygiene and clothing management. - not met  Rolling to Bilateral with Modified [place]. - not met  Supine to sit with Modified [place]. - not met  Stand pivot transfers with Supervision. - not met               Reasons for Discontinuation of Therapy Services  Transfer to alternate level of care.      Plan:  Patient Discharged to: Home with Home Health Service.    BREE Ward 1/21/2017     right        Past Surgical History:   Procedure Laterality Date   • Cosmetic abdominoplasty tummy heberck  03/2020   • Liposuction     • Myringotomy w/ tubes  04/30/2004   • Tonsillectomy  2002   • [place] tooth extraction         Family History:  Family History   Problem Relation Age of Onset   • High cholesterol Mother    • Cancer, Thyroid Mother 33        Papillary thyroid cancer   • High cholesterol Father    • Celiac Disease Father    • Depression Father    • Hypertension Father    • Osteoarthritis Father        Social History:  Social History     Tobacco Use   • Smoking status: Never Smoker   • Smokeless tobacco: Never Used   Substance Use Topics   • Alcohol use: Yes     Comment: 1-2 drinks / month       REVIEW OF SYSTEMS     Review of Systems   Constitutional: Negative for fever.   HENT: Positive for rhinorrhea and sore throat. Negative for trouble swallowing.    Gastrointestinal: Negative for vomiting.       PHYSICAL EXAM     Physical Exam  Vitals and nursing note reviewed.   Constitutional:       General: She is not in acute distress.     Appearance: Normal appearance. She is not ill-appearing, toxic-appearing or diaphoretic.   HENT:      Head: Normocephalic and atraumatic.      Right Ear: Tympanic membrane, ear canal and external ear normal.      Left Ear: Tympanic membrane, ear canal and external ear normal.      Ears:      Comments: Bilateral TMs with blue tubes noted.     Mouth/Throat:      Mouth: Mucous membranes are moist.      Pharynx: Posterior oropharyngeal erythema present. No oropharyngeal exudate.   Cardiovascular:      Rate and Rhythm: Normal rate and regular rhythm.      Heart sounds: Normal heart sounds.   Pulmonary:      Effort: Pulmonary effort is normal. No respiratory distress.      Breath sounds: Normal breath sounds.   Musculoskeletal:      Cervical back: Neck supple.   Skin:     General: Skin is warm and dry.      Findings: No rash.   Neurological:      Mental Status: She is alert  and oriented to person, place, and time.   Psychiatric:         Mood and Affect: Mood normal.         Behavior: Behavior normal.         Judgment: Judgment normal.       Results for orders placed or performed in visit on 10/11/21   COVID DIAGNOSTIC TEST    Specimen: Nasal; Swab   Result Value    POCT SARS-COV-2 ANTIGEN Not Detected   POCT RAPID STREP A   Result Value    GRP A STREP Negative    Internal Procedural Controls Acceptable Yes   POCT INFECTIOUS MONONUCLEOSIS ANTIBODY   Result Value    Monospot Negative         ASSESSMENT/PLAN     MDM/ Urgent care course  23-year-old female with upper respiratory infection and pharyngitis.  Rapid strep test is negative.  Monospot is negative.  Strep culture is pending.  COVID antigen is negative.  Symptomatic treatment was advised.    See patient instructions and after visit summary.    Brianna was seen today for sore throat.    Diagnoses and all orders for this visit:    Pharyngitis, unspecified etiology  -     POCT RAPID STREP A  -     STREPTOCOCCUS GROUP A (STREPTOCOCCUS PYOGENES), BACTERIAL CULTURE  -     POCT INFECTIOUS MONONUCLEOSIS ANTIBODY    Upper respiratory tract infection, unspecified type  -     COVID DIAGNOSTIC TEST      Supervising physician is Dr. Savanah Astudillo.

## 2021-10-12 ENCOUNTER — HOSPITAL ENCOUNTER (OUTPATIENT)
Dept: RADIOLOGY | Facility: HOSPITAL | Age: 56
Discharge: HOME | End: 2021-10-12
Attending: NURSE PRACTITIONER
Payer: MEDICAID

## 2021-10-12 DIAGNOSIS — Z87.891 HISTORY OF SMOKING 30 OR MORE PACK YEARS: ICD-10-CM

## 2021-10-12 DIAGNOSIS — Z12.2 ENCOUNTER FOR SCREENING FOR LUNG CANCER: ICD-10-CM

## 2021-10-12 PROCEDURE — 71271 CT THORAX LUNG CANCER SCR C-: CPT | Mod: TC

## 2022-07-10 ENCOUNTER — HOSPITAL ENCOUNTER (EMERGENCY)
Facility: HOSPITAL | Age: 57
Discharge: HOME | End: 2022-07-10
Attending: EMERGENCY MEDICINE
Payer: MEDICAID

## 2022-07-10 VITALS
HEIGHT: 62 IN | DIASTOLIC BLOOD PRESSURE: 72 MMHG | TEMPERATURE: 98 F | WEIGHT: 129.38 LBS | RESPIRATION RATE: 18 BRPM | OXYGEN SATURATION: 100 % | HEART RATE: 60 BPM | SYSTOLIC BLOOD PRESSURE: 124 MMHG | BODY MASS INDEX: 23.81 KG/M2

## 2022-07-10 DIAGNOSIS — U07.1 COVID-19: Primary | ICD-10-CM

## 2022-07-10 DIAGNOSIS — R07.9 CHEST PAIN: ICD-10-CM

## 2022-07-10 LAB
BACTERIA #/AREA URNS HPF: ABNORMAL /HPF
BILIRUB UR QL STRIP: NEGATIVE
CLARITY UR: CLEAR
COLOR UR: YELLOW
GLUCOSE UR QL STRIP: NEGATIVE
HGB UR QL STRIP: NEGATIVE
HYALINE CASTS #/AREA URNS LPF: 5.9 /LPF
KETONES UR QL STRIP: NEGATIVE
LEUKOCYTE ESTERASE UR QL STRIP: ABNORMAL
MICROSCOPIC COMMENT: ABNORMAL
NITRITE UR QL STRIP: NEGATIVE
PH UR STRIP: 6 [PH] (ref 5–8)
PROT UR QL STRIP: NEGATIVE
RBC #/AREA URNS HPF: 2 /HPF (ref 0–4)
SP GR UR STRIP: 1.01 (ref 1–1.03)
SQUAMOUS #/AREA URNS HPF: 3 /HPF
URN SPEC COLLECT METH UR: ABNORMAL
UROBILINOGEN UR STRIP-ACNC: 1 EU/DL
WBC #/AREA URNS HPF: 4 /HPF (ref 0–5)

## 2022-07-10 PROCEDURE — 93010 EKG 12-LEAD: ICD-10-PCS | Mod: ,,, | Performed by: INTERNAL MEDICINE

## 2022-07-10 PROCEDURE — 93005 ELECTROCARDIOGRAM TRACING: CPT

## 2022-07-10 PROCEDURE — 99284 EMERGENCY DEPT VISIT MOD MDM: CPT | Mod: 25

## 2022-07-10 PROCEDURE — 93010 ELECTROCARDIOGRAM REPORT: CPT | Mod: ,,, | Performed by: INTERNAL MEDICINE

## 2022-07-10 PROCEDURE — P9612 CATHETERIZE FOR URINE SPEC: HCPCS

## 2022-07-10 PROCEDURE — 81000 URINALYSIS NONAUTO W/SCOPE: CPT | Performed by: EMERGENCY MEDICINE

## 2022-07-10 RX ORDER — ALBUTEROL SULFATE 90 UG/1
1-2 AEROSOL, METERED RESPIRATORY (INHALATION) EVERY 6 HOURS PRN
Qty: 18 G | Refills: 0 | Status: SHIPPED | OUTPATIENT
Start: 2022-07-10 | End: 2023-07-10

## 2022-07-10 RX ORDER — ACETAMINOPHEN 500 MG
1000 TABLET ORAL
Status: DISCONTINUED | OUTPATIENT
Start: 2022-07-10 | End: 2022-07-10 | Stop reason: HOSPADM

## 2022-07-10 RX ORDER — BENZONATATE 100 MG/1
100 CAPSULE ORAL 3 TIMES DAILY PRN
Qty: 20 CAPSULE | Refills: 0 | Status: SHIPPED | OUTPATIENT
Start: 2022-07-10 | End: 2022-07-20

## 2022-07-10 NOTE — ED PROVIDER NOTES
"Encounter Date: 7/10/2022       History     Chief Complaint   Patient presents with    COVID-19 Concerns     Tested positive for Covid at home this morning.  C/o body aches, back ache, headache, hoarseness and "heart is shocking me"    Urinary Frequency     Pt reports her urine "smells like the sewage"     This is a 57-year-old white female with multiple medical problems including anxiety, CAD, and hypertension who presents to the emergency department with concerns regarding COVID-19 after testing positive at home.  Patient reports that today she began experiencing generalized body aches, headache, stuffy/runny nose, nonproductive cough, and fatigue.  She also reports that her heart is shocking her, reporting intermittent brief episodes lasting a few seconds  of burning pain to the left chest with no specific exacerbating or relenting features.  She also complains bladder infection due to foul odor to urine.  Patient denies known fever, chills, shortness of breath, vomiting, or diarrhea.  Multiple other family members have tested positive for COVID-19.        Review of patient's allergies indicates:   Allergen Reactions    Bentyl [dicyclomine]     Clindamycin     Macrobid [nitrofurantoin monohyd/m-cryst]     Thorazine [chlorpromazine]     Topamax [topiramate]     Toradol [ketorolac]     Tramadol      Past Medical History:   Diagnosis Date    Anxiety     CAD (coronary artery disease), native coronary artery     Current smoker     Depression     HTN (hypertension), benign     Hyperlipidemia     Stroke 01/17/2017     Past Surgical History:   Procedure Laterality Date    abdominal aortic stent      BLADDER SUSPENSION      CHOLECYSTECTOMY      femoral stents      HERNIA REPAIR      HYSTERECTOMY       History reviewed. No pertinent family history.  Social History     Tobacco Use    Smoking status: Current Every Day Smoker     Packs/day: 1.50     Types: Cigarettes    Smokeless tobacco: Never " Used   Substance Use Topics    Alcohol use: Yes    Drug use: No     Review of Systems   Constitutional: Positive for fatigue. Negative for fever.   HENT: Positive for congestion and rhinorrhea.    Respiratory: Positive for cough. Negative for chest tightness and shortness of breath.    Cardiovascular: Positive for chest pain. Negative for palpitations and leg swelling.   Gastrointestinal: Negative.    Genitourinary: Negative for dysuria and hematuria.        Malodorous urine   Musculoskeletal: Positive for myalgias.   Neurological: Positive for headaches. Negative for dizziness and light-headedness.       Physical Exam     Initial Vitals [07/10/22 1737]   BP Pulse Resp Temp SpO2   119/83 76 18 98.1 °F (36.7 °C) 100 %      MAP       --         Physical Exam    Nursing note and vitals reviewed.  Constitutional: She appears well-developed and well-nourished. She is active. No distress.   HENT:   Head: Normocephalic and atraumatic.   Mouth/Throat: Oropharynx is clear and moist. No oropharyngeal exudate.   Eyes: EOM are normal. Pupils are equal, round, and reactive to light.   Neck: Neck supple.   Normal range of motion.  Cardiovascular: Normal rate, regular rhythm and normal heart sounds.   Pulmonary/Chest: Breath sounds normal. No respiratory distress.   Musculoskeletal:         General: Normal range of motion.      Cervical back: Normal range of motion and neck supple.     Neurological: She is alert and oriented to person, place, and time. GCS score is 15. GCS eye subscore is 4. GCS verbal subscore is 5. GCS motor subscore is 6.   Skin: Skin is warm and dry. Capillary refill takes less than 2 seconds.   Psychiatric: She has a normal mood and affect. Her behavior is normal. Thought content normal.         ED Course   Procedures  Labs Reviewed   URINALYSIS, REFLEX TO URINE CULTURE - Abnormal; Notable for the following components:       Result Value    Leukocytes, UA Trace (*)     All other components within normal  limits    Narrative:     Preferred Collection Type->Urine, Catheterized  Specimen Source->Urine   URINALYSIS MICROSCOPIC - Abnormal; Notable for the following components:    Hyaline Casts, UA 5.9 (*)     All other components within normal limits    Narrative:     Preferred Collection Type->Urine, Catheterized  Specimen Source->Urine     EKG Readings: (Independently Interpreted)   Initial Reading: No STEMI. Rhythm: Normal Sinus Rhythm. Heart Rate: 70. Ectopy: No Ectopy. Conduction: Normal. ST Segments: Normal ST Segments.       Imaging Results    None          Medications - No data to display              ED Course as of 07/10/22 1833   Sun Jul 10, 2022   1831 Urinalysis reveals occasional bacteria in the presence of squamous epithelials with only 4 wbc's.  Will encourage follow-up with PCP for further analysis. [CB]      ED Course User Index  [CB] Mariely Justin NP             Clinical Impression:   Final diagnoses:  [R07.9] Chest pain  [U07.1] COVID-19 (Primary)          ED Disposition Condition    Discharge Stable        ED Prescriptions     Medication Sig Dispense Start Date End Date Auth. Provider    albuterol (PROVENTIL/VENTOLIN HFA) 90 mcg/actuation inhaler Inhale 1-2 puffs into the lungs every 6 (six) hours as needed for Wheezing or Shortness of Breath. Rescue 18 g 7/10/2022 7/10/2023 Mariely Justin NP    benzonatate (TESSALON) 100 MG capsule Take 1 capsule (100 mg total) by mouth 3 (three) times daily as needed for Cough. 20 capsule 7/10/2022 7/20/2022 Mariely Justin NP        Follow-up Information     Follow up With Specialties Details Why Contact Info    Anuel Geronimo NP Emergency Medicine Schedule an appointment as soon as possible for a visit in 2 days for re-evaluation of today's complaint [address] [place] [address]  Hector RAMOS [id number]  [phone]             Mariely Justin NP  07/10/22 1833

## 2022-07-10 NOTE — Clinical Note
"Shun Tidwell (Roeana) was seen and treated in our emergency department on 7/10/2022.     COVID-19 is present in our communities across the state. There is limited testing for COVID at this time, so not all patients can be tested. In this situation, your employee meets the following criteria:    Shun Tidwell has met the criteria for COVID-19 testing and has a POSITIVE result. She can return to work once they are asymptomatic for 24 hours without the use of fever reducing medications AND at least five days from the first positive result. A mask is recommended for 5 days post quarantine.     If you have any questions or concerns, or if I can be of further assistance, please do not hesitate to contact me.    Sincerely,             Mariely Justin NP"

## 2022-07-15 ENCOUNTER — NURSE TRIAGE (OUTPATIENT)
Dept: ADMINISTRATIVE | Facility: CLINIC | Age: 57
End: 2022-07-15
Payer: MEDICAID

## 2022-07-15 NOTE — TELEPHONE ENCOUNTER
"Roeana calling c/o dizziness and  States feels like heart is throwing "shocks"  Covid positive x 6 days. Advised per triage protocol to call  now. V/u.     Reason for Disposition   Sounds like a life-threatening emergency to the triager    Additional Information   Negative: SEVERE difficulty breathing (e.g., struggling for each breath, speaks in single words)   Negative: Difficult to awaken or acting confused (e.g., disoriented, slurred speech)   Negative: Bluish (or gray) lips or face now   Negative: Shock suspected (e.g., cold/pale/clammy skin, too weak to stand, low BP, rapid pulse)    Protocols used: CORONAVIRUS (COVID-19) DIAGNOSED OR [id number]      "

## 2022-09-19 ENCOUNTER — HOSPITAL ENCOUNTER (EMERGENCY)
Facility: HOSPITAL | Age: 57
Discharge: HOME | End: 2022-09-19
Attending: EMERGENCY MEDICINE
Payer: MEDICAID

## 2022-09-19 VITALS
OXYGEN SATURATION: 97 % | RESPIRATION RATE: 18 BRPM | SYSTOLIC BLOOD PRESSURE: 130 MMHG | TEMPERATURE: 99 F | DIASTOLIC BLOOD PRESSURE: 71 MMHG | HEART RATE: 88 BPM | WEIGHT: 132 LBS | BODY MASS INDEX: 24.29 KG/M2 | HEIGHT: 62 IN

## 2022-09-19 DIAGNOSIS — S80.12XA LEG HEMATOMA, LEFT, INITIAL ENCOUNTER: Primary | ICD-10-CM

## 2022-09-19 PROCEDURE — 99283 EMERGENCY DEPT VISIT LOW MDM: CPT

## 2022-09-19 RX ORDER — NAPROXEN 500 MG/1
500 TABLET ORAL 2 TIMES DAILY WITH MEALS
Qty: 10 TABLET | Refills: 0 | Status: SHIPPED | OUTPATIENT
Start: 2022-09-19 | End: 2022-09-24
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
## 2022-09-19 NOTE — DISCHARGE INSTRUCTIONS
Take medication as directed.  Maintain Ace wrap as tolerated and elevate extremity to help promote quicker healing.  Follow up with your doctor in 1-2 days and return to the emergency room for worsening condition.

## 2022-09-22 ENCOUNTER — HOSPITAL ENCOUNTER (EMERGENCY)
Facility: HOSPITAL | Age: 57
Discharge: HOME | End: 2022-09-22
Attending: EMERGENCY MEDICINE
Payer: MEDICAID

## 2022-09-22 VITALS
BODY MASS INDEX: 24.58 KG/M2 | SYSTOLIC BLOOD PRESSURE: 124 MMHG | HEART RATE: 73 BPM | TEMPERATURE: 98 F | DIASTOLIC BLOOD PRESSURE: 85 MMHG | OXYGEN SATURATION: 100 % | WEIGHT: 134.38 LBS | RESPIRATION RATE: 20 BRPM

## 2022-09-22 DIAGNOSIS — S89.92XA LEG INJURY, LEFT, INITIAL ENCOUNTER: Primary | ICD-10-CM

## 2022-09-22 DIAGNOSIS — M79.605 LEFT LEG PAIN: ICD-10-CM

## 2022-09-22 PROCEDURE — 25000003 PHARM REV CODE 250: Performed by: EMERGENCY MEDICINE

## 2022-09-22 PROCEDURE — 29515 APPLICATION SHORT LEG SPLINT: CPT | Mod: LT

## 2022-09-22 PROCEDURE — 99284 EMERGENCY DEPT VISIT MOD MDM: CPT | Mod: 25

## 2022-09-22 PROCEDURE — 96372 THER/PROPH/DIAG INJ SC/IM: CPT | Mod: 59 | Performed by: EMERGENCY MEDICINE

## 2022-09-22 PROCEDURE — 63600175 PHARM REV CODE 636 W HCPCS: Performed by: EMERGENCY MEDICINE

## 2022-09-22 RX ORDER — ACETAMINOPHEN 500 MG
1000 TABLET ORAL
Status: COMPLETED | OUTPATIENT
Start: 2022-09-22 | End: 2022-09-22

## 2022-09-22 RX ORDER — TIZANIDINE 4 MG/1
4 TABLET ORAL EVERY 6 HOURS PRN
Qty: 20 TABLET | Refills: 0 | Status: CANCELLED | OUTPATIENT
Start: 2022-09-22 | End: 2022-10-02

## 2022-09-22 RX ORDER — ORPHENADRINE CITRATE 30 MG/ML
60 INJECTION INTRAMUSCULAR; INTRAVENOUS
Status: COMPLETED | OUTPATIENT
Start: 2022-09-22 | End: 2022-09-22

## 2022-09-22 RX ADMIN — ACETAMINOPHEN 1000 MG: 500 TABLET ORAL at 11:09

## 2022-09-22 RX ADMIN — ORPHENADRINE CITRATE 60 MG: 30 INJECTION INTRAMUSCULAR; INTRAVENOUS at 11:09

## 2022-09-23 NOTE — DISCHARGE INSTRUCTIONS
Take tylenol as needed for pain. Read all discharge instructions/education provided: follow all recommendations and return precautions.  Take all medications as prescribed.  Follow up with your primary care doctor and/or specialist as directed.  Return to emergency department immediately for any new or worsening or recurrent symptoms.
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
## 2022-10-28 ENCOUNTER — HOSPITAL ENCOUNTER (EMERGENCY)
Facility: HOSPITAL | Age: 57
Discharge: HOME | End: 2022-10-28
Attending: STUDENT IN AN ORGANIZED HEALTH CARE EDUCATION/TRAINING PROGRAM
Payer: MEDICAID

## 2022-10-28 VITALS
SYSTOLIC BLOOD PRESSURE: 102 MMHG | BODY MASS INDEX: 25.4 KG/M2 | HEIGHT: 62 IN | WEIGHT: 138 LBS | DIASTOLIC BLOOD PRESSURE: 73 MMHG | OXYGEN SATURATION: 99 % | HEART RATE: 90 BPM | RESPIRATION RATE: 18 BRPM | TEMPERATURE: 99 F

## 2022-10-28 DIAGNOSIS — M79.605 PAIN OF LEFT LOWER EXTREMITY: Primary | ICD-10-CM

## 2022-10-28 PROCEDURE — 99282 EMERGENCY DEPT VISIT SF MDM: CPT

## 2022-10-28 NOTE — Clinical Note
"Shun Cmmarifer" Diann was seen and treated in our emergency department on 10/28/2022.  She may return to work on 10/31/2022.       If you have any questions or concerns, please don't hesitate to call.      Scotty Jean MD"
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
## 2023-04-25 ENCOUNTER — HOSPITAL ENCOUNTER (EMERGENCY)
Facility: HOSPITAL | Age: 58
Discharge: HOME | End: 2023-04-25
Attending: EMERGENCY MEDICINE
Payer: MEDICAID

## 2023-04-25 VITALS
HEIGHT: 62 IN | SYSTOLIC BLOOD PRESSURE: 95 MMHG | HEART RATE: 83 BPM | WEIGHT: 145 LBS | RESPIRATION RATE: 17 BRPM | OXYGEN SATURATION: 99 % | TEMPERATURE: 99 F | BODY MASS INDEX: 26.68 KG/M2 | DIASTOLIC BLOOD PRESSURE: 76 MMHG

## 2023-04-25 DIAGNOSIS — M25.521 RIGHT ELBOW PAIN: ICD-10-CM

## 2023-04-25 DIAGNOSIS — S50.01XA CONTUSION OF RIGHT ELBOW, INITIAL ENCOUNTER: Primary | ICD-10-CM

## 2023-04-25 PROCEDURE — 99283 EMERGENCY DEPT VISIT LOW MDM: CPT

## 2023-04-25 NOTE — ED PROVIDER NOTES
Encounter Date: 4/25/2023       History     Chief Complaint   Patient presents with    Elbow Pain     Right elbow pain x 1 month after fall. Pain with ROM.      50-year-old female with history of anxiety CAD, hypertension presents to ED with complaints of right elbow pain x1 month.  She reports falling on her elbow 1 month ago.  She was taken Tylenol without any improvement in symptoms.  Pain worsens with movement.  No other treatments attempted at home.  Denies any radiation in pain.    The history is provided by the patient.   Review of patient's allergies indicates:   Allergen Reactions    Bentyl [dicyclomine]     Clindamycin     Macrobid [nitrofurantoin monohyd/m-cryst]     Thorazine [chlorpromazine]     Topamax [topiramate]     Toradol [ketorolac]     Tramadol      Past Medical History:   Diagnosis Date    Anxiety     CAD (coronary artery disease), native coronary artery     Current smoker     Depression     HTN (hypertension), benign     Hyperlipidemia     Stroke 01/17/2017     Past Surgical History:   Procedure Laterality Date    abdominal aortic stent      BLADDER SUSPENSION      CHOLECYSTECTOMY      femoral stents      HERNIA REPAIR      HYSTERECTOMY       No family history on file.  Social History     Tobacco Use    Smoking status: Every Day     Packs/day: 1.50     Types: Cigarettes    Smokeless tobacco: Never   Substance Use Topics    Alcohol use: Yes    Drug use: No     Review of Systems   Constitutional:  Negative for fever.   HENT:  Negative for sore throat.    Eyes: Negative.    Respiratory:  Negative for shortness of breath.    Cardiovascular:  Negative for chest pain.   Gastrointestinal:  Negative for nausea.   Endocrine: Negative.    Genitourinary:  Negative for dysuria.   Musculoskeletal:  Positive for arthralgias (Right elbow) and joint swelling. Negative for back pain.   Skin:  Negative for rash.   Allergic/Immunologic: Negative.    Neurological:  Negative for weakness.   Hematological:  Does  not bruise/bleed easily.   Psychiatric/Behavioral: Negative.       Physical Exam     Initial Vitals [04/25/23 1215]   BP Pulse Resp Temp SpO2   95/76 83 17 98.5 °F (36.9 °C) 99 %      MAP       --         Physical Exam    Nursing note and vitals reviewed.  Constitutional: She appears well-developed and well-nourished.   HENT:   Head: Normocephalic and atraumatic.   Eyes: EOM are normal.   Neck: Neck supple.   Normal range of motion.  Cardiovascular:  Normal rate and regular rhythm.           Pulmonary/Chest: No respiratory distress.   Abdominal: She exhibits no distension.   Musculoskeletal:         General: Tenderness and edema present.      Right elbow: Swelling present. No deformity. Decreased range of motion. Tenderness present.      Cervical back: Normal range of motion and neck supple.     Neurological: She is alert and oriented to person, place, and time.   Skin: Skin is warm and dry.   Psychiatric: Thought content normal.       ED Course   Procedures  Labs Reviewed - No data to display       Imaging Results              X-Ray Elbow Complete Right (Final result)  Result time 04/25/23 12:55:22      Final result by Jessica Sinclair MD (04/25/23 12:55:22)                   Impression:      Negative      Electronically signed by: Jessica Sinclair MD  Date:    04/25/2023  Time:    12:55               Narrative:    EXAMINATION:  XR ELBOW COMPLETE 3 VIEW RIGHT    CLINICAL HISTORY:  Pain in right elbow    FINDINGS:  No fracture, dislocation or joint effusion detected                                    X-Rays:   Independently Interpreted Readings:   Other Readings:  No acute fractures or dislocations  Medications - No data to display  Medical Decision Making:   History:   Old Medical Records: I decided to obtain old medical records.  Clinical Tests:   Radiological Study: Ordered and Reviewed  ED Management:  50-year-old female to ED for above complaints.  There was no obvious deformities noted to her elbow.  She did  have some tenderness with minor swelling.  She had pain with range of motion.  X-rays were negative for any acute findings or dislocations.  She was instructed on rice principles.  She stated she has diclofenac gel at home and an elbow brace.  She was stable for discharge.                        Clinical Impression:   Final diagnoses:  [M25.521] Right elbow pain  [S50.01XA] Contusion of right elbow, initial encounter (Primary)        ED Disposition Condition    Discharge Stable          ED Prescriptions    None       Follow-up Information       Follow up With Specialties Details Why Contact Info    Anuel Geronimo NP Emergency Medicine In 2 days  [address] [facility] [address]  [place] [address]  [phone]               Filipe Roberts NP  04/25/23 7513

## 2023-05-18 DIAGNOSIS — Z12.2 SCREENING FOR LUNG CANCER: Primary | ICD-10-CM

## 2023-06-06 ENCOUNTER — NURSE TRIAGE (OUTPATIENT)
Dept: ADMINISTRATIVE | Facility: CLINIC | Age: 58
End: 2023-06-06
Payer: MEDICAID

## 2023-06-06 NOTE — TELEPHONE ENCOUNTER
"Shun states "I took an eight hour tylenol (red and blue capsule) at 1600 today and it's got me sick. I feel nauseas and dizziness. A little chest pain that comes and goes for 1 hour. Feels like I am going to pass out when I get up and walk around." Advised per triage protocol to call  now. V/u.   Reason for Disposition   Sounds like a life-threatening emergency to the triager    Additional Information   Negative: SEVERE difficulty breathing (e.g., struggling for each breath, speaks in single words)   Negative: Difficult to awaken or acting confused (e.g., disoriented, slurred speech)   Negative: Shock suspected (e.g., cold/pale/clammy skin, too weak to stand, low BP, rapid pulse)   Negative: Passed out (i.e., lost consciousness, collapsed and was not responding)   Negative: [1] Chest pain lasts > 5 minutes AND [2] age > 44   Negative: [1] Chest pain lasts > 5 minutes AND [2] age > 30 AND [3] one or more cardiac risk factors (e.g., diabetes, high blood pressure, high cholesterol, smoker, or strong family history of heart disease)   Negative: [1] Chest pain lasts > 5 minutes AND [2] history of heart disease (i.e., angina, heart attack, heart failure, bypass surgery, takes nitroglycerin)   Negative: [1] Chest pain lasts > 5 minutes AND [2] described as crushing, pressure-like, or heavy   Negative: Heart beating < 50 beats per minute OR > 140 beats per minute   Negative: Visible sweat on face or sweat dripping down face    Protocols used: Chest Pain-A-AH    "

## 2024-02-19 ENCOUNTER — HOSPITAL ENCOUNTER (EMERGENCY)
Facility: HOSPITAL | Age: 59
Discharge: HOME | End: 2024-02-19
Attending: INTERNAL MEDICINE
Payer: MEDICAID

## 2024-02-19 VITALS
TEMPERATURE: 98 F | DIASTOLIC BLOOD PRESSURE: 67 MMHG | BODY MASS INDEX: 26.68 KG/M2 | SYSTOLIC BLOOD PRESSURE: 113 MMHG | HEART RATE: 78 BPM | HEIGHT: 62 IN | WEIGHT: 145 LBS | RESPIRATION RATE: 18 BRPM | OXYGEN SATURATION: 99 %

## 2024-02-19 DIAGNOSIS — B96.89 BV (BACTERIAL VAGINOSIS): Primary | ICD-10-CM

## 2024-02-19 DIAGNOSIS — N76.0 BV (BACTERIAL VAGINOSIS): Primary | ICD-10-CM

## 2024-02-19 DIAGNOSIS — I73.9 PAD (PERIPHERAL ARTERY DISEASE): ICD-10-CM

## 2024-02-19 DIAGNOSIS — M79.605 LEFT LEG PAIN: ICD-10-CM

## 2024-02-19 LAB
ALBUMIN SERPL BCP-MCNC: 3.9 G/DL (ref 3.5–5.2)
ALP SERPL-CCNC: 104 U/L (ref 55–135)
ALT SERPL W/O P-5'-P-CCNC: 32 U/L (ref 10–44)
ANION GAP SERPL CALC-SCNC: 5 MMOL/L (ref 3–11)
APTT PPP: 24.4 SEC (ref 21–32)
AST SERPL-CCNC: 16 U/L (ref 10–40)
BACTERIA #/AREA URNS HPF: ABNORMAL /HPF
BACTERIA GENITAL QL WET PREP: ABNORMAL
BASOPHILS # BLD AUTO: 0.06 K/UL (ref 0–0.2)
BASOPHILS NFR BLD: 1.1 % (ref 0–1.9)
BILIRUB SERPL-MCNC: 0.5 MG/DL (ref 0.1–1)
BILIRUB UR QL STRIP: NEGATIVE
BUN SERPL-MCNC: 15 MG/DL (ref 6–20)
CALCIUM SERPL-MCNC: 9.7 MG/DL (ref 8.7–10.5)
CHLORIDE SERPL-SCNC: 106 MMOL/L (ref 95–110)
CLARITY UR: CLEAR
CLUE CELLS VAG QL WET PREP: ABNORMAL
CO2 SERPL-SCNC: 28 MMOL/L (ref 23–29)
COLOR UR: YELLOW
CREAT SERPL-MCNC: 0.9 MG/DL (ref 0.5–1.4)
DIFFERENTIAL METHOD BLD: ABNORMAL
EOSINOPHIL # BLD AUTO: 0.1 K/UL (ref 0–0.5)
EOSINOPHIL NFR BLD: 1.9 % (ref 0–8)
ERYTHROCYTE [DISTWIDTH] IN BLOOD BY AUTOMATED COUNT: 13.7 % (ref 11.5–14.5)
EST. GFR  (NO RACE VARIABLE): >60 ML/MIN/1.73 M^2
FILAMENT FUNGI VAG WET PREP-#/AREA: ABNORMAL
GLUCOSE SERPL-MCNC: 155 MG/DL (ref 70–110)
GLUCOSE UR QL STRIP: ABNORMAL
HCT VFR BLD AUTO: 42.3 % (ref 37–48.5)
HGB BLD-MCNC: 14.2 G/DL (ref 12–16)
HGB UR QL STRIP: NEGATIVE
HYALINE CASTS #/AREA URNS LPF: 0 /LPF
IMM GRANULOCYTES # BLD AUTO: 0.02 K/UL (ref 0–0.04)
IMM GRANULOCYTES NFR BLD AUTO: 0.4 % (ref 0–0.5)
INR PPP: 1 (ref 0.8–1.2)
KETONES UR QL STRIP: NEGATIVE
LEUKOCYTE ESTERASE UR QL STRIP: ABNORMAL
LIPASE SERPL-CCNC: 40 U/L (ref 13–75)
LYMPHOCYTES # BLD AUTO: 1.4 K/UL (ref 1–4.8)
LYMPHOCYTES NFR BLD: 24.9 % (ref 18–48)
MCH RBC QN AUTO: 31.1 PG (ref 27–31)
MCHC RBC AUTO-ENTMCNC: 33.6 G/DL (ref 32–36)
MCV RBC AUTO: 93 FL (ref 82–98)
MICROSCOPIC COMMENT: ABNORMAL
MONOCYTES # BLD AUTO: 0.4 K/UL (ref 0.3–1)
MONOCYTES NFR BLD: 6.7 % (ref 4–15)
NEUTROPHILS # BLD AUTO: 3.7 K/UL (ref 1.8–7.7)
NEUTROPHILS NFR BLD: 65 % (ref 38–73)
NITRITE UR QL STRIP: NEGATIVE
NRBC BLD-RTO: 0 /100 WBC
PH UR STRIP: 7 [PH] (ref 5–8)
PLATELET # BLD AUTO: 228 K/UL (ref 150–450)
PMV BLD AUTO: 10.1 FL (ref 9.2–12.9)
POTASSIUM SERPL-SCNC: 4.1 MMOL/L (ref 3.5–5.1)
PROT SERPL-MCNC: 8.2 G/DL (ref 6–8.4)
PROT UR QL STRIP: NEGATIVE
PROTHROMBIN TIME: 11.3 SEC (ref 9–12.5)
RBC # BLD AUTO: 4.56 M/UL (ref 4–5.4)
RBC #/AREA URNS HPF: 0 /HPF (ref 0–4)
SODIUM SERPL-SCNC: 139 MMOL/L (ref 136–145)
SP GR UR STRIP: <=1.005 (ref 1–1.03)
SPECIMEN SOURCE: ABNORMAL
SQUAMOUS #/AREA URNS HPF: 8 /HPF
T VAGINALIS GENITAL QL WET PREP: ABNORMAL
URN SPEC COLLECT METH UR: ABNORMAL
UROBILINOGEN UR STRIP-ACNC: NEGATIVE EU/DL
WBC # BLD AUTO: 5.66 K/UL (ref 3.9–12.7)
WBC #/AREA URNS HPF: 6 /HPF (ref 0–5)
WBC #/AREA VAG WET PREP: ABNORMAL
YEAST GENITAL QL WET PREP: ABNORMAL

## 2024-02-19 PROCEDURE — 85730 THROMBOPLASTIN TIME PARTIAL: CPT

## 2024-02-19 PROCEDURE — 80053 COMPREHEN METABOLIC PANEL: CPT

## 2024-02-19 PROCEDURE — 99285 EMERGENCY DEPT VISIT HI MDM: CPT | Mod: 25

## 2024-02-19 PROCEDURE — 85610 PROTHROMBIN TIME: CPT

## 2024-02-19 PROCEDURE — 81000 URINALYSIS NONAUTO W/SCOPE: CPT

## 2024-02-19 PROCEDURE — 83690 ASSAY OF LIPASE: CPT

## 2024-02-19 PROCEDURE — 63600175 PHARM REV CODE 636 W HCPCS

## 2024-02-19 PROCEDURE — 36415 COLL VENOUS BLD VENIPUNCTURE: CPT

## 2024-02-19 PROCEDURE — 85025 COMPLETE CBC W/AUTO DIFF WBC: CPT

## 2024-02-19 PROCEDURE — 96374 THER/PROPH/DIAG INJ IV PUSH: CPT

## 2024-02-19 PROCEDURE — 87210 SMEAR WET MOUNT SALINE/INK: CPT

## 2024-02-19 RX ORDER — METRONIDAZOLE 500 MG/1
500 TABLET ORAL EVERY 12 HOURS
Qty: 14 TABLET | Refills: 0 | Status: SHIPPED | OUTPATIENT
Start: 2024-02-19 | End: 2024-02-26

## 2024-02-19 RX ORDER — ONDANSETRON HYDROCHLORIDE 2 MG/ML
4 INJECTION, SOLUTION INTRAVENOUS
Status: COMPLETED | OUTPATIENT
Start: 2024-02-19 | End: 2024-02-19

## 2024-02-19 RX ADMIN — ONDANSETRON 4 MG: 2 INJECTION INTRAMUSCULAR; INTRAVENOUS at 03:02

## 2024-02-19 NOTE — ED PROVIDER NOTES
Encounter Date: 2/19/2024       History     Chief Complaint   Patient presents with    Leg Pain     Pt stated that for the past month she has been experiencing left leg pain / numbness / coldness. Seen at [facility] last month and dx with sciatica.      58-year-old female with history of CAD, PVD, hypertension, hyperlipidemia, stroke, aortic aneurysm presents to ED with complaints of continued pain to left leg for over a month.  Pain worsens with walking and improved with rest.  Reports being seen at an outside facility and diagnosed with sciatica.  No improvement with muscle relaxers.  Pain does not radiate.  Continues to smoke 1 pack cigarettes per day.  Additionally reports some nausea that started this morning with vaginal odor.  Does have some burning upon urination.  No aggravating or relieving factors.    The history is provided by the patient.     Review of patient's allergies indicates:   Allergen Reactions    Bentyl [dicyclomine]     Clindamycin     Macrobid [nitrofurantoin monohyd/m-cryst]     Sulfa (sulfonamide antibiotics)     Thorazine [chlorpromazine]     Topamax [topiramate]     Toradol [ketorolac]     Tramadol      Past Medical History:   Diagnosis Date    Anxiety     CAD (coronary artery disease), native coronary artery     Current smoker     Depression     HTN (hypertension), benign     Hyperlipidemia     Stroke 01/17/2017     Past Surgical History:   Procedure Laterality Date    abdominal aortic stent      BLADDER SUSPENSION      CHOLECYSTECTOMY      femoral stents      HERNIA REPAIR      HYSTERECTOMY       No family history on file.  Social History     Tobacco Use    Smoking status: Every Day     Current packs/day: 1.50     Types: Cigarettes    Smokeless tobacco: Never   Substance Use Topics    Alcohol use: Yes    Drug use: No     Review of Systems   Constitutional:  Negative for fever.   HENT:  Negative for sore throat.    Eyes: Negative.    Respiratory:  Negative for  shortness of breath.    Cardiovascular:  Negative for chest pain.   Gastrointestinal:  Positive for abdominal pain and nausea. Negative for vomiting.   Endocrine: Negative.    Genitourinary:  Positive for decreased urine volume, dysuria and vaginal discharge (White).   Musculoskeletal:  Negative for back pain.   Skin:  Positive for color change (Left foot). Negative for rash.   Allergic/Immunologic: Negative.    Neurological:  Positive for numbness (Left leg). Negative for weakness.   Hematological:  Does not bruise/bleed easily.   Psychiatric/Behavioral: Negative.         Physical Exam     Initial Vitals   BP Pulse Resp Temp SpO2   02/19/24 1348 02/19/24 1348 02/19/24 1348 02/19/24 1349 02/19/24 1348   115/67 76 16 97.8 °F (36.6 °C) 98 %      MAP       --                Physical Exam    Nursing note and vitals reviewed.  Constitutional: She appears well-developed and well-nourished.   HENT:   Head: Normocephalic and atraumatic.   Eyes: EOM are normal.   Neck: Neck supple.   Normal range of motion.  Cardiovascular:  Normal rate and regular rhythm.           Pulmonary/Chest: No respiratory distress.   Abdominal: Abdomen is soft. She exhibits no distension. There is no abdominal tenderness.   Musculoskeletal:         General: Normal range of motion.      Cervical back: Normal range of motion and neck supple.     Neurological: She is alert and oriented to person, place, and time.   Skin: Skin is warm and dry.   Psychiatric: Thought content normal.         ED Course   Procedures  Labs Reviewed   VAGINAL SCREEN - Abnormal; Notable for the following components:       Result Value    Clue Cells Rare (*)     WBC - Vaginal Screen Rare (*)     Bacteria - Vaginal Screen Rare (*)     All other components within normal limits    Narrative:     Specimen Source->Vagina  Release to patient->Immediate   URINALYSIS, REFLEX TO URINE CULTURE - Abnormal; Notable for the following components:    Specific Gravity, UA <=1.005 (*)      Glucose, UA 2+ (*)     Leukocytes, UA Trace (*)     All other components within normal limits    Narrative:     Preferred Collection Type->Urine, Clean Catch  Specimen Source->Urine   CBC W/ AUTO DIFFERENTIAL - Abnormal; Notable for the following components:    MCH 31.1 (*)     All other components within normal limits   COMPREHENSIVE METABOLIC PANEL - Abnormal; Notable for the following components:    Glucose 155 (*)     All other components within normal limits   URINALYSIS MICROSCOPIC - Abnormal; Notable for the following components:    WBC, UA 6 (*)     Hyaline Casts, UA 0.0 (*)     All other components within normal limits    Narrative:     Preferred Collection Type->Urine, Clean Catch  Specimen Source->Urine   LIPASE   PROTIME-INR   APTT          Imaging Results              US Lower Extremity Arteries Left (Final result)  Result time 02/19/24 15:35:58      Final result by Rajesh Krishnan MD (02/19/24 15:35:58)                   Impression:      Patent left lower extremity arteries with decreased velocities and abnormal monophasic waveforms throughout.  This could be secondary to diffuse vascular disease or an upstream stenosis.      Electronically signed by: Rajesh Krishnan MD  Date:    02/19/2024  Time:    15:35               Narrative:    EXAMINATION:  US LOWER EXTREMITY ARTERIES LEFT    CLINICAL HISTORY:  Left leg pain    COMPARISON:  None    FINDINGS:  Duplex ultrasound evaluation of the major arteries of the left lower extremity was performed.  The common femoral, deep femoral, superficial femoral, popliteal, posterior tibial and dorsalis pedis arteries are all patent.  There are decreased velocities and abnormal monophasic waveforms throughout.                                       Medications   ondansetron injection 4 mg (4 mg Intravenous Given 2/19/24 1523)     Medical Decision Making  Ddx: PAD, arterial blockage, BV, UTI, tobacco abuse    58-year-old female to ED for above complaints.  She was complain of  left lower extremity pain and numbness.  Left lower extremity cooler to touch greater than right.  Cap refill is delayed.  Arterial ultrasound obtained and ruled out any blockages.  Patient does have history of stents in bilateral lower extremities.  Ambulatory referral to vascular surgery was placed and patient was encouraged to stop smoking cigarettes.  Patient was additionally had some  complaints.  UA negative for signs of infection.  Wet prep was positive for clue cells and bacteria.  Will treat for BV with Flagyl.  Return precautions given.  Patient to follow up with primary care and vascular surgery.                                      Clinical Impression:  Final diagnoses:  [M79.605] Left leg pain  [N76.0, B96.89] BV (bacterial vaginosis) (Primary)  [I73.9] PAD (peripheral artery disease)          ED Disposition Condition    Discharge Stable          ED Prescriptions       Medication Sig Dispense Start Date End Date Auth. Provider    metroNIDAZOLE (FLAGYL) 500 MG tablet Take 1 tablet (500 mg total) by mouth every 12 (twelve) hours. for 7 days 14 tablet 2/19/2024 2/26/2024 Filipe Roberts NP          Follow-up Information       Follow up With Specialties Details Why Contact Info    Anuel Geronimo, [facility]   [address] [facility] [place] [address]  [phone]      Anuel Geronimo, [facility]   [address] [facility] [facility] [place] [address]  [phone]      [facility]   [address] [facility] [address]  [phone]             Filipe Roberts NP  02/19/24 0090

## 2024-02-19 NOTE — DISCHARGE INSTRUCTIONS
Take the antibiotics as prescribed.  Avoid any alcohol while taking antibiotics.  Follow up with primary care and vascular surgery.

## 2024-07-28 ENCOUNTER — NURSE TRIAGE (OUTPATIENT)
Dept: ADMINISTRATIVE | Facility: CLINIC | Age: 59
End: 2024-07-28
Payer: MEDICAID

## 2024-07-28 NOTE — TELEPHONE ENCOUNTER
"[place]    PCP:  CAIT Arriola    Pt reports stopped smoking and had surgery for her leg and aorta.  She reports taste in her mouth like "poop", malodorous urine, and nausea.  Denies abdominal pain, V/D, sores in mouth, film on tongue, constipation, burning with urination, blood in urine, fever, SOB, and CP.  New med: Protonix.  She is eating and drinking well and having good urinary output.  Per protocol, care advised is see PCP within 24 hrs.  Since pts PCP is non-[facility] Provider, advised to contact PCP tomorrow for an appt/OD VV/UC/ED.  Pt VU and will call PCP tomorrow for an appt.  Advised to call for worsening/questions/concerns.  VU.  Unable to route d/t non-[facility] Provider.    Reason for Disposition   Unexplained nausea   Urinating more frequently than usual (i.e., frequency) OR new-onset of the feeling of an urgent need to urinate (i.e., urgency)    Additional Information   Negative: Shock suspected (e.g., cold/pale/clammy skin, too weak to stand, low BP, rapid pulse)   Negative: Sounds like a life-threatening emergency to the triager   Negative: Unable to walk, or can only walk with assistance (e.g., requires support)   Negative: Difficulty breathing   Negative: [1] Insulin-dependent diabetes (Type I) AND [2] glucose > 400 mg/dL (22 mmol/L)   Negative: [1] Drinking very little AND [2] dehydration suspected (e.g., no urine > 12 hours, very dry mouth, very lightheaded)   Negative: Patient sounds very sick or weak to the triager   Negative: Fever > 104 F (40 C)   Negative: [1] Fever > 101 F (38.3 C) AND [2] age > 60 years   Negative: [1] Fever > 100 F (37.8 C) AND [2] bedridden (e.g., CVA, chronic illness, recovering from surgery)   Negative: [1] Fever > 100 F (37.8 C) AND [2] diabetes mellitus or weak immune system (e.g., HIV positive, cancer chemo, splenectomy, organ transplant, chronic steroids)   Negative: Taking any of the following medications: digoxin (Lanoxin), lithium, theophylline, phenytoin (Dilantin)   " Negative: Yellowish color of the skin or white of the eye (i.e., jaundice)   Negative: Fever present > 3 days (72 hours)   Negative: Receiving cancer chemotherapy medication   Negative: Taking prescription medication that could cause nausea (e.g., narcotics/opiates, antibiotics, OCPs, many others)   Negative: Nausea lasts > 1 week   Negative: Substance use (drug use) or unhealthy alcohol use, known or suspected   Negative: Nausea is a chronic symptom (recurrent or ongoing AND present > 4 weeks)   Negative: Shock suspected (e.g., cold/pale/clammy skin, too weak to stand, low BP, rapid pulse)   Negative: Sounds like a life-threatening emergency to the triager   Negative: [1] Unable to urinate (or only a few drops) > 4 hours AND [2] bladder feels very full (e.g., palpable bladder or strong urge to urinate)   Negative: [1] Decreased urination and [2] drinking very little AND [3] dehydration suspected (e.g., dark urine, no urine > 12 hours, very dry mouth, very lightheaded)   Negative: Patient sounds very sick or weak to the triager   Negative: Fever > 100.4 F (38.0 C)   Negative: Side (flank) or lower back pain present    Protocols used: Nausea-A-AH, Urinary Symptoms-A-AH